# Patient Record
Sex: FEMALE | Race: WHITE | NOT HISPANIC OR LATINO | Employment: OTHER
[De-identification: names, ages, dates, MRNs, and addresses within clinical notes are randomized per-mention and may not be internally consistent; named-entity substitution may affect disease eponyms.]

---

## 2017-04-06 LAB
ANALYZER ANC (IANC): ABNORMAL
ANION GAP SERPL CALC-SCNC: 12 MMOL/L (ref 10–20)
BASOPHILS # BLD: 0 THOUSAND/MCL (ref 0–0.3)
BASOPHILS NFR BLD: 0 %
BUN SERPL-MCNC: 14 MG/DL (ref 6–20)
BUN/CREAT SERPL: 17 (ref 7–25)
CALCIUM SERPL-MCNC: 8.5 MG/DL (ref 8.4–10.2)
CHLORIDE: 91 MMOL/L (ref 98–107)
CO2 SERPL-SCNC: 28 MMOL/L (ref 21–32)
CREAT SERPL-MCNC: 0.82 MG/DL (ref 0.51–0.95)
DIFFERENTIAL METHOD BLD: ABNORMAL
EOSINOPHIL # BLD: 0.3 THOUSAND/MCL (ref 0.1–0.5)
EOSINOPHIL NFR BLD: 3 %
ERYTHROCYTE [DISTWIDTH] IN BLOOD: 12.6 % (ref 11–15)
GLUCOSE SERPL-MCNC: 113 MG/DL (ref 65–99)
HEMATOCRIT: 38.8 % (ref 36–46.5)
HGB BLD-MCNC: 13.6 GM/DL (ref 12–15.5)
LYMPHOCYTES # BLD: 1.6 THOUSAND/MCL (ref 1–4)
LYMPHOCYTES NFR BLD: 17 %
MCH RBC QN AUTO: 30.6 PG (ref 26–34)
MCHC RBC AUTO-ENTMCNC: 35.1 GM/DL (ref 32–36.5)
MCV RBC AUTO: 87.4 FL (ref 78–100)
MONOCYTES # BLD: 1 THOUSAND/MCL (ref 0.3–0.9)
MONOCYTES NFR BLD: 11 %
NEUTROPHILS # BLD: 6.5 THOUSAND/MCL (ref 1.8–7.7)
NEUTROPHILS NFR BLD: 69 %
NEUTS SEG NFR BLD: ABNORMAL %
PERCENT NRBC: ABNORMAL
PLATELET # BLD: 312 THOUSAND/MCL (ref 140–450)
POTASSIUM SERPL-SCNC: 4 MMOL/L (ref 3.4–5.1)
RBC # BLD: 4.44 MILLION/MCL (ref 4–5.2)
SODIUM SERPL-SCNC: 127 MMOL/L (ref 135–145)
TROPONIN I SERPL HS-MCNC: <0.02 NG/ML
WBC # BLD: 9.4 THOUSAND/MCL (ref 4.2–11)

## 2017-04-07 ENCOUNTER — DIAGNOSTIC TRANS (OUTPATIENT)
Dept: OTHER | Age: 82
End: 2017-04-07

## 2017-04-07 ENCOUNTER — HOSPITAL (OUTPATIENT)
Dept: OTHER | Age: 82
End: 2017-04-07
Attending: HOSPITALIST

## 2017-04-07 LAB
ANION GAP SERPL CALC-SCNC: 12 MMOL/L (ref 10–20)
BUN SERPL-MCNC: 15 MG/DL (ref 6–20)
BUN/CREAT SERPL: 21 (ref 7–25)
CALCIUM SERPL-MCNC: 8.9 MG/DL (ref 8.4–10.2)
CHLORIDE: 96 MMOL/L (ref 98–107)
CO2 SERPL-SCNC: 25 MMOL/L (ref 21–32)
CREAT SERPL-MCNC: 0.72 MG/DL (ref 0.51–0.95)
GLUCOSE SERPL-MCNC: 136 MG/DL (ref 65–99)
POTASSIUM SERPL-SCNC: 3.8 MMOL/L (ref 3.4–5.1)
SODIUM SERPL-SCNC: 129 MMOL/L (ref 135–145)
TROPONIN I SERPL HS-MCNC: 0.05 NG/ML
TROPONIN I SERPL HS-MCNC: 0.13 NG/ML
TROPONIN I SERPL HS-MCNC: <0.02 NG/ML

## 2017-04-24 PROBLEM — I10 ESSENTIAL HYPERTENSION: Status: ACTIVE | Noted: 2017-04-24

## 2017-09-11 ENCOUNTER — APPOINTMENT (OUTPATIENT)
Dept: GENERAL RADIOLOGY | Facility: HOSPITAL | Age: 82
DRG: 309 | End: 2017-09-11
Attending: HOSPITALIST
Payer: MEDICARE

## 2017-09-11 ENCOUNTER — APPOINTMENT (OUTPATIENT)
Dept: GENERAL RADIOLOGY | Age: 82
DRG: 309 | End: 2017-09-11
Attending: HOSPITALIST
Payer: MEDICARE

## 2017-09-11 ENCOUNTER — HOSPITAL ENCOUNTER (INPATIENT)
Facility: HOSPITAL | Age: 82
LOS: 2 days | Discharge: HOME | DRG: 309 | End: 2017-09-13
Attending: EMERGENCY MEDICINE | Admitting: HOSPITALIST
Payer: MEDICARE

## 2017-09-11 ENCOUNTER — APPOINTMENT (OUTPATIENT)
Dept: GENERAL RADIOLOGY | Facility: HOSPITAL | Age: 82
DRG: 309 | End: 2017-09-11
Attending: EMERGENCY MEDICINE
Payer: MEDICARE

## 2017-09-11 ENCOUNTER — APPOINTMENT (OUTPATIENT)
Dept: CT IMAGING | Facility: HOSPITAL | Age: 82
DRG: 309 | End: 2017-09-11
Attending: EMERGENCY MEDICINE
Payer: MEDICARE

## 2017-09-11 DIAGNOSIS — R55 SYNCOPE AND COLLAPSE: Primary | ICD-10-CM

## 2017-09-11 DIAGNOSIS — E87.1 HYPONATREMIA: ICD-10-CM

## 2017-09-11 LAB
ALBUMIN SERPL-MCNC: 3 G/DL (ref 3.5–4.8)
ALP LIVER SERPL-CCNC: 73 U/L (ref 55–142)
ALT SERPL-CCNC: 20 U/L (ref 14–54)
AST SERPL-CCNC: 15 U/L (ref 15–41)
ATRIAL RATE: 56 BPM
ATRIAL RATE: 57 BPM
ATRIAL RATE: 61 BPM
BASOPHILS # BLD AUTO: 0.04 X10(3) UL (ref 0–0.1)
BASOPHILS NFR BLD AUTO: 0.2 %
BILIRUB SERPL-MCNC: 0.8 MG/DL (ref 0.1–2)
BILIRUB UR QL STRIP.AUTO: NEGATIVE
BUN BLD-MCNC: 22 MG/DL (ref 8–20)
CALCIUM BLD-MCNC: 8.5 MG/DL (ref 8.3–10.3)
CHLORIDE: 90 MMOL/L (ref 101–111)
CLARITY UR REFRACT.AUTO: CLEAR
CO2: 24 MMOL/L (ref 22–32)
COLOR UR AUTO: YELLOW
CREAT BLD-MCNC: 0.96 MG/DL (ref 0.55–1.02)
EOSINOPHIL # BLD AUTO: 0.07 X10(3) UL (ref 0–0.3)
EOSINOPHIL NFR BLD AUTO: 0.4 %
ERYTHROCYTE [DISTWIDTH] IN BLOOD BY AUTOMATED COUNT: 12.5 % (ref 11.5–16)
GLUCOSE BLD-MCNC: 116 MG/DL (ref 70–99)
GLUCOSE UR STRIP.AUTO-MCNC: NEGATIVE MG/DL
HCT VFR BLD AUTO: 37.2 % (ref 34–50)
HGB BLD-MCNC: 13.2 G/DL (ref 12–16)
IMMATURE GRANULOCYTE COUNT: 0.14 X10(3) UL (ref 0–1)
IMMATURE GRANULOCYTE RATIO %: 0.9 %
LACTIC ACID: 0.1 MMOL/L (ref 0.5–2)
LEUKOCYTE ESTERASE UR QL STRIP.AUTO: NEGATIVE
LYMPHOCYTES # BLD AUTO: 1.03 X10(3) UL (ref 0.9–4)
LYMPHOCYTES NFR BLD AUTO: 6.3 %
M PROTEIN MFR SERPL ELPH: 6.6 G/DL (ref 6.1–8.3)
MCH RBC QN AUTO: 31.4 PG (ref 27–33.2)
MCHC RBC AUTO-ENTMCNC: 35.5 G/DL (ref 31–37)
MCV RBC AUTO: 88.4 FL (ref 81–100)
MONOCYTES # BLD AUTO: 1.49 X10(3) UL (ref 0.1–0.6)
MONOCYTES NFR BLD AUTO: 9.1 %
NEUTROPHIL ABS PRELIM: 13.66 X10 (3) UL (ref 1.3–6.7)
NEUTROPHILS # BLD AUTO: 13.66 X10(3) UL (ref 1.3–6.7)
NEUTROPHILS NFR BLD AUTO: 83.1 %
NITRITE UR QL STRIP.AUTO: NEGATIVE
P AXIS: 37 DEGREES
P AXIS: 38 DEGREES
P AXIS: 47 DEGREES
P-R INTERVAL: 184 MS
P-R INTERVAL: 184 MS
P-R INTERVAL: 186 MS
PH UR STRIP.AUTO: 6 [PH] (ref 4.5–8)
PLATELET # BLD AUTO: 443 10(3)UL (ref 150–450)
POTASSIUM SERPL-SCNC: 4.2 MMOL/L (ref 3.6–5.1)
PROT UR STRIP.AUTO-MCNC: NEGATIVE MG/DL
Q-T INTERVAL: 428 MS
Q-T INTERVAL: 432 MS
Q-T INTERVAL: 452 MS
QRS DURATION: 88 MS
QRS DURATION: 94 MS
QRS DURATION: 98 MS
QTC CALCULATION (BEZET): 413 MS
QTC CALCULATION (BEZET): 420 MS
QTC CALCULATION (BEZET): 455 MS
R AXIS: -28 DEGREES
R AXIS: -28 DEGREES
R AXIS: -29 DEGREES
RBC # BLD AUTO: 4.21 X10(6)UL (ref 3.8–5.1)
RBC UR QL AUTO: NEGATIVE
RED CELL DISTRIBUTION WIDTH-SD: 40 FL (ref 35.1–46.3)
SODIUM SERPL-SCNC: 124 MMOL/L (ref 136–144)
SP GR UR STRIP.AUTO: 1.01 (ref 1–1.03)
T AXIS: 16 DEGREES
T AXIS: 25 DEGREES
T AXIS: 35 DEGREES
TROPONIN: <0.046 NG/ML (ref ?–0.05)
UROBILINOGEN UR STRIP.AUTO-MCNC: <2 MG/DL
VENTRICULAR RATE: 56 BPM
VENTRICULAR RATE: 57 BPM
VENTRICULAR RATE: 61 BPM
WBC # BLD AUTO: 16.4 X10(3) UL (ref 4–13)

## 2017-09-11 PROCEDURE — 71010 XR CHEST AP PORTABLE  (CPT=71010): CPT | Performed by: EMERGENCY MEDICINE

## 2017-09-11 PROCEDURE — 70450 CT HEAD/BRAIN W/O DYE: CPT | Performed by: EMERGENCY MEDICINE

## 2017-09-11 PROCEDURE — 99223 1ST HOSP IP/OBS HIGH 75: CPT | Performed by: HOSPITALIST

## 2017-09-11 PROCEDURE — 73523 X-RAY EXAM HIPS BI 5/> VIEWS: CPT | Performed by: HOSPITALIST

## 2017-09-11 PROCEDURE — 72100 X-RAY EXAM L-S SPINE 2/3 VWS: CPT | Performed by: HOSPITALIST

## 2017-09-11 RX ORDER — AMOXICILLIN 500 MG/1
500 CAPSULE ORAL 3 TIMES DAILY
Status: DISCONTINUED | OUTPATIENT
Start: 2017-09-11 | End: 2017-09-13

## 2017-09-11 RX ORDER — HEPARIN SODIUM 5000 [USP'U]/ML
5000 INJECTION, SOLUTION INTRAVENOUS; SUBCUTANEOUS EVERY 8 HOURS SCHEDULED
Status: DISCONTINUED | OUTPATIENT
Start: 2017-09-11 | End: 2017-09-13

## 2017-09-11 RX ORDER — ONDANSETRON 2 MG/ML
4 INJECTION INTRAMUSCULAR; INTRAVENOUS EVERY 6 HOURS PRN
Status: DISCONTINUED | OUTPATIENT
Start: 2017-09-11 | End: 2017-09-13

## 2017-09-11 RX ORDER — SODIUM CHLORIDE 9 MG/ML
INJECTION, SOLUTION INTRAVENOUS CONTINUOUS
Status: CANCELLED | OUTPATIENT
Start: 2017-09-11 | End: 2017-09-11

## 2017-09-11 RX ORDER — ATORVASTATIN CALCIUM 20 MG/1
20 TABLET, FILM COATED ORAL NIGHTLY
Status: DISCONTINUED | OUTPATIENT
Start: 2017-09-12 | End: 2017-09-13

## 2017-09-11 RX ORDER — AMLODIPINE BESYLATE 5 MG/1
5 TABLET ORAL DAILY
Status: DISCONTINUED | OUTPATIENT
Start: 2017-09-12 | End: 2017-09-13

## 2017-09-11 RX ORDER — SODIUM CHLORIDE 9 MG/ML
INJECTION, SOLUTION INTRAVENOUS CONTINUOUS
Status: DISCONTINUED | OUTPATIENT
Start: 2017-09-11 | End: 2017-09-11

## 2017-09-11 RX ORDER — CLOPIDOGREL BISULFATE 75 MG/1
75 TABLET ORAL DAILY
Status: DISCONTINUED | OUTPATIENT
Start: 2017-09-12 | End: 2017-09-13

## 2017-09-11 RX ORDER — SODIUM CHLORIDE 9 MG/ML
INJECTION, SOLUTION INTRAVENOUS CONTINUOUS
Status: DISCONTINUED | OUTPATIENT
Start: 2017-09-11 | End: 2017-09-13

## 2017-09-11 RX ORDER — LOSARTAN POTASSIUM 100 MG/1
100 TABLET ORAL DAILY
Status: DISCONTINUED | OUTPATIENT
Start: 2017-09-12 | End: 2017-09-13

## 2017-09-11 RX ORDER — PRAVASTATIN SODIUM 80 MG/1
80 TABLET ORAL DAILY
COMMUNITY
End: 2018-07-09

## 2017-09-11 RX ORDER — ASPIRIN 81 MG/1
81 TABLET ORAL DAILY
COMMUNITY
End: 2020-04-06

## 2017-09-11 RX ORDER — METOPROLOL SUCCINATE 100 MG/1
100 TABLET, EXTENDED RELEASE ORAL
Status: DISCONTINUED | OUTPATIENT
Start: 2017-09-12 | End: 2017-09-13

## 2017-09-11 RX ORDER — ONDANSETRON 2 MG/ML
4 INJECTION INTRAMUSCULAR; INTRAVENOUS EVERY 4 HOURS PRN
Status: CANCELLED | OUTPATIENT
Start: 2017-09-11

## 2017-09-11 RX ORDER — ASPIRIN 81 MG/1
81 TABLET ORAL DAILY
Status: DISCONTINUED | OUTPATIENT
Start: 2017-09-12 | End: 2017-09-13

## 2017-09-11 RX ORDER — AMOXICILLIN 500 MG/1
500 CAPSULE ORAL 3 TIMES DAILY
COMMUNITY
Start: 2017-09-06 | End: 2017-09-13

## 2017-09-11 RX ORDER — ACETAMINOPHEN 325 MG/1
650 TABLET ORAL EVERY 6 HOURS PRN
Status: DISCONTINUED | OUTPATIENT
Start: 2017-09-11 | End: 2017-09-13

## 2017-09-11 RX ORDER — EZETIMIBE 10 MG/1
10 TABLET ORAL NIGHTLY
Status: DISCONTINUED | OUTPATIENT
Start: 2017-09-11 | End: 2017-09-13

## 2017-09-11 NOTE — ED NOTES
Pt on phone with daughter informing her of plan of care. Resting. Denies dizziness, nausea, lightheadedness prior to syncopal event or afterwards.   States \"I was talking with the people at the store and next thing I knew, someone was holding my head and

## 2017-09-11 NOTE — CONSULTS
[facility]  Report of Consultation    Padmini Slade Patient Status:  Emergency    1927 MRN [id number]   Location [address] [facility] Attending Bradford Gallegos MD   Hosp Day # 0 PCP Marino Cruz MD, MD     Reason for [facility].    History:  Past Medical History:   Diagnosis Date   • Atherosclerosis of coronary artery    • CAD (coronary artery disease) 3/2/2015   • Carotid artery disease ([facility] 3/2/2015   • Dyslipidemia 3/2/2015   • [facility] 3/2/2015     Pas 09/11/17  1418   * 124*   K 4.90 4.2   CL 90* 90*   CO2 28.2 24.0   BUN 22* 22*   CREATSERUM 1.03* 0.96   CA 8.9 8.5       Recent Labs   09/09/17  0831 09/11/17  1418   ALT 20 20   AST 21 15   ALB 3.2* 3.0*         Recent Labs   09/11/17  1418   TROP

## 2017-09-11 NOTE — ED PROVIDER NOTES
Patient Seen in: [facility] Emergency Department    History   Patient presents with:  Syncope (cardiovascular, neurologic)    Stated Complaint: syncope    HPI    This is a 40-year-old female who states that she was at a thrift fair.   She states that Pulse 58   Temp 98.1 °F (36.7 °C) (Temporal)   Resp 14   Ht 154.9 cm (5' 1\")   Wt 68.9 kg   SpO2 99%   BMI 28.72 kg/m²         Physical Exam  General: .   The patient has no signs of trauma to head or neck there is no midline neck tenderness she has hernan Please view results for these tests on the individual orders.    URINALYSIS WITH CULTURE REFLEX   LACTIC ACID, PLASMA   LACTIC ACID, PLASMA   LACTIC ACID, PLASMA   RAINBOW DRAW BLUE   RAINBOW DRAW LAVENDER   RAINBOW DRAW LIGHT GREEN   RAINBOW DRAW GOLD The visualized paranasal sinuses show mucosal thickening within the right maxillary sinus. . No evidence of depressed skull fracture. CONCLUSION: #1. No acute intracranial findings #2. Cerebral atrophy with small vessel changes.    Dictated by: Von I discussed this case with Dr. Hayes Lay will see the patient in consultation. The urine will be sent if the patient is able to urinate. Here.     Disposition and Plan     Clinical Impression:  Syncope and collapse  (primary encounter diagnosis)  Hypon

## 2017-09-11 NOTE — H&P
[facility]  History and Physical     Johnie Hinders Patient Status:  Emergency    1927 MRN [id number]   Location 6[facility] Attending Edgardo Dunaway MD   Hosp Day # 0 PCP Morro Davis MD, MD     Chief Complaint: AmLODIPine Besylate 5 MG Oral Tab Take 1 tablet (5 mg total) by mouth daily. Disp: 90 tablet Rfl: 3   Clopidogrel Bisulfate (PLAVIX) 75 MG Oral Tab Take 1 tablet (75 mg total) by mouth daily.  Disp: 90 tablet Rfl: 3       Review of Systems:   A comprehens hypertension   6. Renal artery stenosis sp stent  7. Dyslipidemia  8. Leukocytosis may be reactive or d/t tooth infection  9.  Right lung nodule    Plan:  Admit  IVF  Hold HCTZ  ECHO  Telemetry  Cardiology consult  Check hip / pelvic and lumbar spine x-ray

## 2017-09-11 NOTE — ED INITIAL ASSESSMENT (HPI)
Pt had witnessed syncopal episode today while shopping. Reports not eating breakfast and following a restricted soft food diet over the last few days r/t oral surgery.

## 2017-09-12 ENCOUNTER — APPOINTMENT (OUTPATIENT)
Dept: CV DIAGNOSTICS | Facility: HOSPITAL | Age: 82
DRG: 309 | End: 2017-09-12
Attending: HOSPITALIST
Payer: MEDICARE

## 2017-09-12 ENCOUNTER — APPOINTMENT (OUTPATIENT)
Dept: CV DIAGNOSTICS | Facility: HOSPITAL | Age: 82
DRG: 309 | End: 2017-09-12
Attending: INTERNAL MEDICINE
Payer: MEDICARE

## 2017-09-12 LAB
BASOPHILS # BLD AUTO: 0.04 X10(3) UL (ref 0–0.1)
BASOPHILS NFR BLD AUTO: 0.3 %
BUN BLD-MCNC: 13 MG/DL (ref 8–20)
CALCIUM BLD-MCNC: 8.4 MG/DL (ref 8.3–10.3)
CHLORIDE: 96 MMOL/L (ref 101–111)
CO2: 21 MMOL/L (ref 22–32)
CREAT BLD-MCNC: 0.62 MG/DL (ref 0.55–1.02)
EOSINOPHIL # BLD AUTO: 0.1 X10(3) UL (ref 0–0.3)
EOSINOPHIL NFR BLD AUTO: 0.7 %
ERYTHROCYTE [DISTWIDTH] IN BLOOD BY AUTOMATED COUNT: 12.4 % (ref 11.5–16)
GLUCOSE BLD-MCNC: 89 MG/DL (ref 70–99)
HAV IGM SER QL: 2.1 MG/DL (ref 1.7–3)
HCT VFR BLD AUTO: 35.9 % (ref 34–50)
HGB BLD-MCNC: 12.5 G/DL (ref 12–16)
IMMATURE GRANULOCYTE COUNT: 0.12 X10(3) UL (ref 0–1)
IMMATURE GRANULOCYTE RATIO %: 0.9 %
LYMPHOCYTES # BLD AUTO: 1.32 X10(3) UL (ref 0.9–4)
LYMPHOCYTES NFR BLD AUTO: 9.7 %
MCH RBC QN AUTO: 31.1 PG (ref 27–33.2)
MCHC RBC AUTO-ENTMCNC: 34.8 G/DL (ref 31–37)
MCV RBC AUTO: 89.3 FL (ref 81–100)
MONOCYTES # BLD AUTO: 1.41 X10(3) UL (ref 0.1–0.6)
MONOCYTES NFR BLD AUTO: 10.3 %
NEUTROPHIL ABS PRELIM: 10.66 X10 (3) UL (ref 1.3–6.7)
NEUTROPHILS # BLD AUTO: 10.66 X10(3) UL (ref 1.3–6.7)
NEUTROPHILS NFR BLD AUTO: 78.1 %
PLATELET # BLD AUTO: 387 10(3)UL (ref 150–450)
POTASSIUM SERPL-SCNC: 4.2 MMOL/L (ref 3.6–5.1)
RBC # BLD AUTO: 4.02 X10(6)UL (ref 3.8–5.1)
RED CELL DISTRIBUTION WIDTH-SD: 40.8 FL (ref 35.1–46.3)
SODIUM SERPL-SCNC: 126 MMOL/L (ref 136–144)
WBC # BLD AUTO: 13.7 X10(3) UL (ref 4–13)

## 2017-09-12 PROCEDURE — 99233 SBSQ HOSP IP/OBS HIGH 50: CPT | Performed by: HOSPITALIST

## 2017-09-12 PROCEDURE — 93306 TTE W/DOPPLER COMPLETE: CPT | Performed by: INTERNAL MEDICINE

## 2017-09-12 NOTE — PLAN OF CARE
Pt A/O X4. RA. NSR, SB on tele. Denies dizziness or lightheadedness. Denies pain. Pt is sleeping comfortably. Will continue to monitor.      CARDIOVASCULAR - ADULT    • Maintains optimal cardiac output and hemodynamic stability Progressing    • Absence of c

## 2017-09-12 NOTE — PROGRESS NOTES
09/12/17 0506 09/12/17 0508 09/12/17 0510   Vital Signs   /50 148/59 120/64   BP Location Left arm Left arm Left arm   BP Method Automatic Automatic Automatic   Patient Position Lying Sitting Standing

## 2017-09-12 NOTE — PROGRESS NOTES
[facility]  Cardiology Progress Note    Johnei Hinders Patient Status:  Inpatient    1927 MRN [id number]   [facility] 2NE-A Attending Dalila Ahumada, MD   Hosp Day # 1 PCP Morro Davis MD, MD     Assessment:  syncope-I suspect this JVD  Cardiac:   RRR, normal S1 S2, no murmurs/gallops  Lungs:  Clear to auscultation bilaterally, good chest wall expansion  Abdomen: Soft, non-tender, non-distended. No hepatosplenomegaly, masses  Extremities:  Peripheral pulses are  2+, no edema.   Neuro

## 2017-09-12 NOTE — OCCUPATIONAL THERAPY NOTE
IP OT attempt to see patient for therapeutic treatment. Patient occupied with ECHO at this time. Will attempt again as able.     MÓNICA West, OTR/L  Master of Occupational Therapy  Occupational Therapist, Registered/Licensed

## 2017-09-12 NOTE — PROGRESS NOTES
[facility]  Progress Note     Kyara Morris Patient Status:  Inpatient    1927 MRN [id number]   [facility] 2NE-A Attending Carlos Smith MD   Hosp Day # 1 PCP Dimitris Ovalles MD, MD     Chief Complaint: Syncope    S: Patient repo amoxicillin  500 mg Oral TID   • Losartan Potassium  100 mg Oral Daily   • Metoprolol Succinate ER  100 mg Oral Daily Beta Blocker   • atorvastatin  20 mg Oral Nightly   • Heparin Sodium (Porcine)  5,000 Units Subcutaneous Q8H Brittany 62       ASSESSMENT / PLAN: cardiology follow-up re: ECHO  Julia DIAZ

## 2017-09-12 NOTE — PHYSICAL THERAPY NOTE
PHYSICAL THERAPY QUICK EVALUATION - INPATIENT    Room Number: 8433/8599-W  Evaluation Date: 9/12/2017  Presenting Problem: syncope, hyponatremia  Physician Order: PT Eval and Treat    Problem List  Principal Problem:    Syncope and collapse  Active Probl from another person does the patient currently need. ..   -   Moving to and from a bed to a chair (including a wheelchair)?: None   -   Need to walk in hospital room?: None   -   Climbing 3-5 steps with a railing?: None       AM-PAC Score:  Raw Score: 24

## 2017-09-12 NOTE — PLAN OF CARE
Maintains optimal cardiac output and hemodynamic stability Progressing    BP stable, orthostatics checked q shift, skin warm & dry. Absence of cardiac arrhythmias or at baseline Progressing    NSR on tele.   Electrolytes maintained within normal limits Pro

## 2017-09-13 VITALS
BODY MASS INDEX: 28.7 KG/M2 | OXYGEN SATURATION: 97 % | WEIGHT: 152 LBS | DIASTOLIC BLOOD PRESSURE: 54 MMHG | RESPIRATION RATE: 20 BRPM | SYSTOLIC BLOOD PRESSURE: 150 MMHG | HEIGHT: 61 IN | TEMPERATURE: 98 F | HEART RATE: 67 BPM

## 2017-09-13 LAB
BUN BLD-MCNC: 12 MG/DL (ref 8–20)
CALCIUM BLD-MCNC: 8 MG/DL (ref 8.3–10.3)
CHLORIDE: 101 MMOL/L (ref 101–111)
CO2: 22 MMOL/L (ref 22–32)
CREAT BLD-MCNC: 0.59 MG/DL (ref 0.55–1.02)
GLUCOSE BLD-MCNC: 100 MG/DL (ref 70–99)
POTASSIUM SERPL-SCNC: 4 MMOL/L (ref 3.6–5.1)
SODIUM SERPL-SCNC: 130 MMOL/L (ref 136–144)

## 2017-09-13 PROCEDURE — 99239 HOSP IP/OBS DSCHRG MGMT >30: CPT | Performed by: HOSPITALIST

## 2017-09-13 NOTE — OCCUPATIONAL THERAPY NOTE
OCCUPATIONAL THERAPY QUICK EVALUATION - INPATIENT    Room Number: 5889/9850-J  Evaluation Date: 9/13/2017     Type of Evaluation: Quick Eval  Presenting Problem: syncoep and collapse    Physician Order: IP Consult to Occupational Therapy  Reason for Precious Kinga ASSESSMENT  Upper extremity ROM is within functional limits     Upper extremity strength is within functional limits     NEUROLOGICAL FINDINGS  Neurological Findings: Coordination - Finger to Nose;Coordination - Rapid Alternating Movement; Coordination - Fi Patient is a 80year old female admitted on 9/11/2017 for Syncope and collapse [R55]  Hyponatremia [E87.1. Complete medical history and occupational profile noted above. Pt at baseline with regard to function. Pt DC from IP OT services at this time.

## 2017-09-13 NOTE — PROGRESS NOTES
[facility]  Progress Note     Edwin Boudreaux Patient Status:  Inpatient    1927 MRN [id number]   [facility] 2NE-A Attending Sera Diaz MD   Hosp Day # 2 PCP Eboni Kimball MD, MD     Chief Complaint: Syncope    S: Patient repo • AmLODIPine Besylate  5 mg Oral Daily   • ezetimibe  10 mg Oral Nightly   • Clopidogrel Bisulfate  75 mg Oral Daily   • aspirin  81 mg Oral Daily   • amoxicillin  500 mg Oral TID   • Losartan Potassium  100 mg Oral Daily   • Metoprolol Succinate ER  100

## 2017-09-13 NOTE — DISCHARGE SUMMARY
[facility] [place] [facility]  DISCHARGE SUMMARY     [facility] Patient Status:  Inpatient    1927 MRN [id number]   [facility] 2NE-A Attending Beckie Dunaway MD   Hosp Day # 2 PCP Ronald Fuller MD, MD     Date of Admission: 2017  Date of Reema Lua weakness. No headache or change in vision. Brief Synopsis: Patient presented after syncopal episode. Does not recall events. CT brain neg, labs showed hyponatremia. Patient is on HCTZ at home. She denied any dizziness/weakness.  Decreases po intake s Laurel Master, [facility] [address] [address] [place]  [phone]    Schedule an appointment as soon as possible for a visit in 1 week    Future Appointments  Date Time Provider Zoraida Mcdonough   9/13/2017 2:40 PM Anmol Eid

## 2017-09-13 NOTE — PROGRESS NOTES
[facility]  Cardiology Progress Note    Johnie Hinders Patient Status:  Inpatient    1927 MRN [id number]   [facility] 2NE-A Attending Dalila Ahumada, MD   Hosp Day # 2 PCP Morro Davis MD, MD     Assessment:  syncope-I suspect this 1057 : 156 lb (70.8 kg)      Physical Exam:  General:  no apparent distress. HEENT:  mucous membranes moist, sclera anicteric.  Tooth bleeding  Neck: Supple with no JVD  Cardiac:   RRR, normal S1 S2,  no murmurs/gallops  Lungs:  Clear to auscultation bilat

## 2017-09-21 ENCOUNTER — HOSPITAL ENCOUNTER (OUTPATIENT)
Dept: CT IMAGING | Age: 82
Discharge: HOME | End: 2017-09-21
Attending: INTERNAL MEDICINE
Payer: MEDICARE

## 2017-09-21 DIAGNOSIS — R91.1 PULMONARY NODULE: ICD-10-CM

## 2017-09-21 PROCEDURE — 71260 CT THORAX DX C+: CPT | Performed by: INTERNAL MEDICINE

## 2019-03-20 ENCOUNTER — HOSPITAL ENCOUNTER (OUTPATIENT)
Dept: GENERAL RADIOLOGY | Age: 84
Discharge: HOME | End: 2019-03-20
Attending: INTERNAL MEDICINE
Payer: MEDICARE

## 2019-03-20 DIAGNOSIS — M48.00 SPINAL STENOSIS: ICD-10-CM

## 2019-03-20 PROCEDURE — 72100 X-RAY EXAM L-S SPINE 2/3 VWS: CPT | Performed by: INTERNAL MEDICINE

## 2020-05-23 ENCOUNTER — HOSPITAL ENCOUNTER (OUTPATIENT)
Age: 85
Setting detail: OBSERVATION
Discharge: HOME | End: 2020-05-24
Attending: EMERGENCY MEDICINE | Admitting: HOSPITALIST

## 2020-05-23 DIAGNOSIS — R04.0 ACUTE ANTERIOR EPISTAXIS: Primary | ICD-10-CM

## 2020-05-23 LAB
ANION GAP SERPL CALC-SCNC: 12 MMOL/L (ref 10–20)
BASOPHILS # BLD: 0.1 K/MCL (ref 0–0.3)
BASOPHILS NFR BLD: 1 %
BUN SERPL-MCNC: 19 MG/DL (ref 6–20)
BUN/CREAT SERPL: 23 (ref 7–25)
CALCIUM SERPL-MCNC: 8.5 MG/DL (ref 8.4–10.2)
CHLORIDE SERPL-SCNC: 98 MMOL/L (ref 98–107)
CO2 SERPL-SCNC: 24 MMOL/L (ref 21–32)
CREAT SERPL-MCNC: 0.83 MG/DL (ref 0.51–0.95)
DIFFERENTIAL METHOD BLD: ABNORMAL
EOSINOPHIL # BLD: 0.4 K/MCL (ref 0.1–0.5)
EOSINOPHIL NFR BLD: 4 %
ERYTHROCYTE [DISTWIDTH] IN BLOOD: 13.2 % (ref 11–15)
GLUCOSE SERPL-MCNC: 111 MG/DL (ref 65–99)
HCT VFR BLD CALC: 41 % (ref 36–46.5)
HGB BLD-MCNC: 13.9 G/DL (ref 12–15.5)
IMM GRANULOCYTES # BLD AUTO: 0.1 K/MCL (ref 0–0.2)
IMM GRANULOCYTES NFR BLD: 1 %
INR PPP: 1
LYMPHOCYTES # BLD: 1.9 K/MCL (ref 1–4)
LYMPHOCYTES NFR BLD: 16 %
MCH RBC QN AUTO: 30.2 PG (ref 26–34)
MCHC RBC AUTO-ENTMCNC: 33.9 G/DL (ref 32–36.5)
MCV RBC AUTO: 88.9 FL (ref 78–100)
MONOCYTES # BLD: 1.3 K/MCL (ref 0.3–0.9)
MONOCYTES NFR BLD: 11 %
NEUTROPHILS # BLD: 8.1 K/MCL (ref 1.8–7.7)
NEUTROPHILS NFR BLD: 67 %
NRBC BLD MANUAL-RTO: 0 /100 WBC
PLATELET # BLD: 465 K/MCL (ref 140–450)
POTASSIUM SERPL-SCNC: 4.2 MMOL/L (ref 3.4–5.1)
PROTHROMBIN TIME: 10.9 SEC (ref 9.7–11.8)
RBC # BLD: 4.61 MIL/MCL (ref 4–5.2)
SARS-COV-2 RNA RESP QL NAA+PROBE: NOT DETECTED
SERVICE CMNT-IMP: NORMAL
SODIUM SERPL-SCNC: 130 MMOL/L (ref 135–145)
SPECIMEN SOURCE: NORMAL
WBC # BLD: 11.7 K/MCL (ref 4.2–11)

## 2020-05-23 PROCEDURE — 36415 COLL VENOUS BLD VENIPUNCTURE: CPT

## 2020-05-23 PROCEDURE — G0378 HOSPITAL OBSERVATION PER HR: HCPCS

## 2020-05-23 PROCEDURE — 80048 BASIC METABOLIC PNL TOTAL CA: CPT

## 2020-05-23 PROCEDURE — C9803 HOPD COVID-19 SPEC COLLECT: HCPCS

## 2020-05-23 PROCEDURE — 85025 COMPLETE CBC W/AUTO DIFF WBC: CPT

## 2020-05-23 PROCEDURE — 87635 SARS-COV-2 COVID-19 AMP PRB: CPT

## 2020-05-23 PROCEDURE — 99284 EMERGENCY DEPT VISIT MOD MDM: CPT

## 2020-05-23 PROCEDURE — 10002803 HB RX 637: Performed by: EMERGENCY MEDICINE

## 2020-05-23 PROCEDURE — 30901 CONTROL OF NOSEBLEED: CPT

## 2020-05-23 PROCEDURE — 10004651 HB RX, NO CHARGE ITEM: Performed by: HOSPITALIST

## 2020-05-23 PROCEDURE — 10002803 HB RX 637: Performed by: HOSPITALIST

## 2020-05-23 PROCEDURE — 85610 PROTHROMBIN TIME: CPT

## 2020-05-23 RX ORDER — AMLODIPINE BESYLATE 5 MG/1
5 TABLET ORAL
COMMUNITY
Start: 2020-01-30 | End: 2023-03-10 | Stop reason: ALTCHOICE

## 2020-05-23 RX ORDER — METOPROLOL SUCCINATE 100 MG/1
100 TABLET, EXTENDED RELEASE ORAL DAILY
COMMUNITY
Start: 2020-05-11

## 2020-05-23 RX ORDER — IRBESARTAN 300 MG/1
300 TABLET ORAL DAILY
COMMUNITY
Start: 2020-03-21 | End: 2023-03-10 | Stop reason: SDUPTHER

## 2020-05-23 RX ORDER — CLOPIDOGREL BISULFATE 75 MG/1
75 TABLET ORAL
Status: ON HOLD | COMMUNITY
Start: 2016-11-21 | End: 2020-05-24 | Stop reason: HOSPADM

## 2020-05-23 RX ORDER — ACETAMINOPHEN 325 MG/1
650 TABLET ORAL EVERY 4 HOURS PRN
Status: DISCONTINUED | OUTPATIENT
Start: 2020-05-23 | End: 2020-05-24 | Stop reason: HOSPADM

## 2020-05-23 RX ORDER — PRAVASTATIN SODIUM 80 MG/1
80 TABLET ORAL NIGHTLY
COMMUNITY
Start: 2020-02-05

## 2020-05-23 RX ORDER — TRANEXAMIC ACID 100 MG/ML
1 INJECTION, SOLUTION INTRAVENOUS ONCE
Status: DISCONTINUED | OUTPATIENT
Start: 2020-05-23 | End: 2020-05-23 | Stop reason: CLARIF

## 2020-05-23 RX ORDER — HYDRALAZINE HYDROCHLORIDE 20 MG/ML
10 INJECTION INTRAMUSCULAR; INTRAVENOUS EVERY 6 HOURS PRN
Status: DISCONTINUED | OUTPATIENT
Start: 2020-05-23 | End: 2020-05-24 | Stop reason: HOSPADM

## 2020-05-23 RX ORDER — CEPHALEXIN 250 MG/1
500 CAPSULE ORAL ONCE
Status: COMPLETED | OUTPATIENT
Start: 2020-05-23 | End: 2020-05-23

## 2020-05-23 RX ORDER — ONDANSETRON 2 MG/ML
4 INJECTION INTRAMUSCULAR; INTRAVENOUS EVERY 6 HOURS PRN
Status: DISCONTINUED | OUTPATIENT
Start: 2020-05-23 | End: 2020-05-24 | Stop reason: HOSPADM

## 2020-05-23 RX ORDER — SODIUM CHLORIDE 9 MG/ML
INJECTION, SOLUTION INTRAVENOUS CONTINUOUS
Status: CANCELLED | OUTPATIENT
Start: 2020-05-23

## 2020-05-23 RX ORDER — AMOXICILLIN AND CLAVULANATE POTASSIUM 500; 125 MG/1; MG/1
1 TABLET, FILM COATED ORAL EVERY 8 HOURS SCHEDULED
Status: DISCONTINUED | OUTPATIENT
Start: 2020-05-23 | End: 2020-05-24 | Stop reason: HOSPADM

## 2020-05-23 RX ORDER — 0.9 % SODIUM CHLORIDE 0.9 %
2 VIAL (ML) INJECTION EVERY 12 HOURS SCHEDULED
Status: DISCONTINUED | OUTPATIENT
Start: 2020-05-23 | End: 2020-05-24 | Stop reason: HOSPADM

## 2020-05-23 RX ORDER — ASPIRIN 81 MG/1
81 TABLET ORAL DAILY
Status: ON HOLD | COMMUNITY
End: 2020-05-24 | Stop reason: HOSPADM

## 2020-05-23 RX ORDER — OXYMETAZOLINE HYDROCHLORIDE 0.05 G/100ML
2 SPRAY NASAL ONCE
Status: COMPLETED | OUTPATIENT
Start: 2020-05-23 | End: 2020-05-23

## 2020-05-23 RX ADMIN — CEPHALEXIN 500 MG: 250 CAPSULE ORAL at 13:41

## 2020-05-23 RX ADMIN — SODIUM CHLORIDE, PRESERVATIVE FREE 2 ML: 5 INJECTION INTRAVENOUS at 22:11

## 2020-05-23 RX ADMIN — Medication 2 SPRAY: at 13:00

## 2020-05-23 RX ADMIN — AMOXICILLIN AND CLAVULANATE POTASSIUM 1 TABLET: 500; 125 TABLET, FILM COATED ORAL at 22:11

## 2020-05-23 SDOH — HEALTH STABILITY: MENTAL HEALTH: HOW OFTEN DO YOU HAVE A DRINK CONTAINING ALCOHOL?: NEVER

## 2020-05-23 ASSESSMENT — ACTIVITIES OF DAILY LIVING (ADL)
ADL_SCORE: 12
ADL_BEFORE_ADMISSION: INDEPENDENT
RECENT_DECLINE_ADL: NO
CHRONIC_PAIN_PRESENT: NO
ADL_SHORT_OF_BREATH: NO

## 2020-05-23 ASSESSMENT — COGNITIVE AND FUNCTIONAL STATUS - GENERAL
DO YOU HAVE SERIOUS DIFFICULTY WALKING OR CLIMBING STAIRS: NO
ARE YOU DEAF OR DO YOU HAVE SERIOUS DIFFICULTY  HEARING: NO
DO YOU HAVE DIFFICULTY DRESSING OR BATHING: NO
BECAUSE OF A PHYSICAL, MENTAL, OR EMOTIONAL CONDITION, DO YOU HAVE DIFFICULTY DOING ERRANDS ALONE: NO
ARE YOU BLIND OR DO YOU HAVE SERIOUS DIFFICULTY SEEING, EVEN WHEN WEARING GLASSES: NO

## 2020-05-23 ASSESSMENT — ENCOUNTER SYMPTOMS
SHORTNESS OF BREATH: 0
DIZZINESS: 0
COUGH: 0
FEVER: 0
ABDOMINAL PAIN: 0
WEAKNESS: 0
VOMITING: 0
SORE THROAT: 0
CHILLS: 0
NAUSEA: 0

## 2020-05-23 ASSESSMENT — PAIN SCALES - GENERAL
PAINLEVEL_OUTOF10: 0

## 2020-05-23 ASSESSMENT — LIFESTYLE VARIABLES
HOW OFTEN DO YOU HAVE A DRINK CONTAINING ALCOHOL: NEVER
ALCOHOL_USE_STATUS: NO OR LOW RISK WITH VALIDATED TOOL
HOW OFTEN DO YOU HAVE 6 OR MORE DRINKS ON ONE OCCASION: NEVER
AUDIT-C TOTAL SCORE: 0
HOW MANY STANDARD DRINKS CONTAINING ALCOHOL DO YOU HAVE ON A TYPICAL DAY: 0,1 OR 2

## 2020-05-24 VITALS
RESPIRATION RATE: 16 BRPM | OXYGEN SATURATION: 94 % | TEMPERATURE: 97.9 F | HEART RATE: 74 BPM | WEIGHT: 149.47 LBS | HEIGHT: 60 IN | SYSTOLIC BLOOD PRESSURE: 171 MMHG | BODY MASS INDEX: 29.35 KG/M2 | DIASTOLIC BLOOD PRESSURE: 68 MMHG

## 2020-05-24 LAB
ANION GAP SERPL CALC-SCNC: 13 MMOL/L (ref 10–20)
BASOPHILS # BLD: 0.1 K/MCL (ref 0–0.3)
BASOPHILS NFR BLD: 1 %
BUN SERPL-MCNC: 20 MG/DL (ref 6–20)
BUN/CREAT SERPL: 24 (ref 7–25)
CALCIUM SERPL-MCNC: 8.4 MG/DL (ref 8.4–10.2)
CHLORIDE SERPL-SCNC: 98 MMOL/L (ref 98–107)
CO2 SERPL-SCNC: 26 MMOL/L (ref 21–32)
CREAT SERPL-MCNC: 0.84 MG/DL (ref 0.51–0.95)
DIFFERENTIAL METHOD BLD: ABNORMAL
EOSINOPHIL # BLD: 0.2 K/MCL (ref 0.1–0.5)
EOSINOPHIL NFR BLD: 2 %
ERYTHROCYTE [DISTWIDTH] IN BLOOD: 13.2 % (ref 11–15)
GLUCOSE SERPL-MCNC: 132 MG/DL (ref 65–99)
HCT VFR BLD CALC: 36.1 % (ref 36–46.5)
HGB BLD-MCNC: 12 G/DL (ref 12–15.5)
IMM GRANULOCYTES # BLD AUTO: 0.1 K/MCL (ref 0–0.2)
IMM GRANULOCYTES NFR BLD: 0 %
LYMPHOCYTES # BLD: 1.8 K/MCL (ref 1–4)
LYMPHOCYTES NFR BLD: 12 %
MCH RBC QN AUTO: 30.2 PG (ref 26–34)
MCHC RBC AUTO-ENTMCNC: 33.2 G/DL (ref 32–36.5)
MCV RBC AUTO: 90.7 FL (ref 78–100)
MONOCYTES # BLD: 1.4 K/MCL (ref 0.3–0.9)
MONOCYTES NFR BLD: 9 %
NEUTROPHILS # BLD: 12.2 K/MCL (ref 1.8–7.7)
NEUTROPHILS NFR BLD: 76 %
NRBC BLD MANUAL-RTO: 0 /100 WBC
PLATELET # BLD: 414 K/MCL (ref 140–450)
POTASSIUM SERPL-SCNC: 4.9 MMOL/L (ref 3.4–5.1)
RBC # BLD: 3.98 MIL/MCL (ref 4–5.2)
SODIUM SERPL-SCNC: 132 MMOL/L (ref 135–145)
WBC # BLD: 15.8 K/MCL (ref 4.2–11)

## 2020-05-24 PROCEDURE — G0378 HOSPITAL OBSERVATION PER HR: HCPCS

## 2020-05-24 PROCEDURE — 36415 COLL VENOUS BLD VENIPUNCTURE: CPT

## 2020-05-24 PROCEDURE — 80048 BASIC METABOLIC PNL TOTAL CA: CPT

## 2020-05-24 PROCEDURE — 10002803 HB RX 637: Performed by: HOSPITALIST

## 2020-05-24 PROCEDURE — 85025 COMPLETE CBC W/AUTO DIFF WBC: CPT

## 2020-05-24 RX ORDER — METOPROLOL SUCCINATE 100 MG/1
100 TABLET, EXTENDED RELEASE ORAL DAILY
Status: DISCONTINUED | OUTPATIENT
Start: 2020-05-24 | End: 2020-05-24 | Stop reason: HOSPADM

## 2020-05-24 RX ORDER — LOSARTAN POTASSIUM 50 MG/1
100 TABLET ORAL DAILY
Status: DISCONTINUED | OUTPATIENT
Start: 2020-05-24 | End: 2020-05-24 | Stop reason: HOSPADM

## 2020-05-24 RX ORDER — AMOXICILLIN AND CLAVULANATE POTASSIUM 500; 125 MG/1; MG/1
1 TABLET, FILM COATED ORAL EVERY 8 HOURS SCHEDULED
Qty: 15 TABLET | Refills: 0 | Status: SHIPPED | OUTPATIENT
Start: 2020-05-24 | End: 2020-05-29

## 2020-05-24 RX ORDER — PRAVASTATIN SODIUM 40 MG
80 TABLET ORAL NIGHTLY
Status: DISCONTINUED | OUTPATIENT
Start: 2020-05-24 | End: 2020-05-24 | Stop reason: HOSPADM

## 2020-05-24 RX ORDER — AMLODIPINE BESYLATE 5 MG/1
5 TABLET ORAL DAILY
Status: DISCONTINUED | OUTPATIENT
Start: 2020-05-24 | End: 2020-05-24 | Stop reason: HOSPADM

## 2020-05-24 RX ADMIN — AMOXICILLIN AND CLAVULANATE POTASSIUM 1 TABLET: 500; 125 TABLET, FILM COATED ORAL at 06:16

## 2020-05-24 ASSESSMENT — ENCOUNTER SYMPTOMS
EYE REDNESS: 0
RESPIRATORY NEGATIVE: 1
ENDOCRINE NEGATIVE: 1
GASTROINTESTINAL NEGATIVE: 1
FACIAL SWELLING: 0
TROUBLE SWALLOWING: 0
EYE ITCHING: 0
RHINORRHEA: 0
CONSTITUTIONAL NEGATIVE: 1
SORE THROAT: 0
VOICE CHANGE: 0
PHOTOPHOBIA: 0
ALLERGIC/IMMUNOLOGIC NEGATIVE: 1
NEUROLOGICAL NEGATIVE: 1
PSYCHIATRIC NEGATIVE: 1
EYE PAIN: 0
EYE DISCHARGE: 0
SINUS PRESSURE: 0
SINUS PAIN: 0
HEMATOLOGIC/LYMPHATIC NEGATIVE: 1

## 2020-05-24 ASSESSMENT — PAIN SCALES - GENERAL: PAINLEVEL_OUTOF10: 0

## 2021-03-05 DIAGNOSIS — Z23 NEED FOR VACCINATION: ICD-10-CM

## 2022-03-17 ENCOUNTER — HOSPITAL ENCOUNTER (OUTPATIENT)
Dept: GENERAL RADIOLOGY | Facility: HOSPITAL | Age: 87
Discharge: HOME | End: 2022-03-17
Attending: ORTHOPAEDIC SURGERY
Payer: MEDICARE

## 2022-03-17 DIAGNOSIS — M54.9 BACK PAIN: ICD-10-CM

## 2022-03-17 PROCEDURE — 72110 X-RAY EXAM L-2 SPINE 4/>VWS: CPT | Performed by: ORTHOPAEDIC SURGERY

## 2022-07-08 ENCOUNTER — OFFICE VISIT (OUTPATIENT)
Dept: NEUROLOGY | Facility: CLINIC | Age: 87
End: 2022-07-08
Payer: MEDICARE

## 2022-07-08 VITALS — DIASTOLIC BLOOD PRESSURE: 54 MMHG | HEART RATE: 69 BPM | SYSTOLIC BLOOD PRESSURE: 122 MMHG

## 2022-07-08 DIAGNOSIS — R56.9 SEIZURE (HCC): Primary | ICD-10-CM

## 2022-07-08 PROCEDURE — 99204 OFFICE O/P NEW MOD 45 MIN: CPT | Performed by: OTHER

## 2022-07-08 RX ORDER — LEVETIRACETAM 1000 MG/1
500 TABLET ORAL 2 TIMES DAILY
Qty: 180 TABLET | Refills: 3 | Status: SHIPPED | OUTPATIENT
Start: 2022-07-08

## 2022-07-08 RX ORDER — CIPROFLOXACIN 500 MG/1
TABLET, FILM COATED ORAL
COMMUNITY
Start: 2022-07-06

## 2022-07-14 ENCOUNTER — HOSPITAL ENCOUNTER (OUTPATIENT)
Dept: ELECTROPHYSIOLOGY | Facility: HOSPITAL | Age: 87
Discharge: HOME | End: 2022-07-14
Attending: Other
Payer: MEDICARE

## 2022-07-14 PROCEDURE — 95816 EEG AWAKE AND DROWSY: CPT | Performed by: OTHER

## 2022-07-22 ENCOUNTER — PATIENT MESSAGE (OUTPATIENT)
Dept: NEUROLOGY | Facility: CLINIC | Age: 87
End: 2022-07-22

## 2022-07-25 NOTE — TELEPHONE ENCOUNTER
I tried to call the patient directly, there was no answer. Please try to reach out to her again emphasized importance of taking seizure medications. If she is not liking side effects then we can consider a different seizure medication.     I would suggest to make a follow-up appointment so can discuss everything in person in more detail

## 2022-07-25 NOTE — TELEPHONE ENCOUNTER
From: Chalo Charlton  To: Pia Ramirez MD  Sent: 7/22/2022 12:17 PM CDT  Subject: Regarding Med    This is [facility] Daughter, Jamil Herbert. ... Just thought you'd like to know, She called me this am to tell me that she is no longer taking the Keppra cause it makes her sleep. She said that the first two days of starting the medicine all she did was sleep, so she stopped taking it. I told her that you said she would be sleepy until she became use to it but she is refusing to take it. Now I know she can be real stubborn regarding medication, so if you want her to take it, maybe you will need to call her and insist that it be taken. She will not listen to me, so I dont know what else to do. The CT scan is schedule for Aug 2.  Thank You

## 2022-07-25 NOTE — TELEPHONE ENCOUNTER
I spoke with the patient and informed her that it is necessary for her to take her medication so that she does not have a seizure. Informed her that it is very important for her health that she continue the current prescribed medication. The pt verbalized understanding and stated that she will start the [address] again today as she would rather have a side effect of sleeping rather than to have a seizure. I informed the patient that AK stated if she prefers, an alternate medication can be discussed at an OV. The pt stated she has a pending appointment and will stay on the [address] until that time and then discuss an alternate if her side effects do not subside. The pt has a pending appointment on 8/9/22. Message to [place] as azar. Reviewed and electronically signed by:  [address] [facility], [address] [place] [address], [facility]

## 2022-08-02 ENCOUNTER — HOSPITAL ENCOUNTER (OUTPATIENT)
Dept: MRI IMAGING | Facility: HOSPITAL | Age: 87
Discharge: HOME | End: 2022-08-02
Attending: Other
Payer: MEDICARE

## 2022-08-02 DIAGNOSIS — R56.9 SEIZURE (HCC): ICD-10-CM

## 2022-08-02 PROCEDURE — 82565 ASSAY OF CREATININE: CPT

## 2022-08-02 PROCEDURE — A9575 INJ GADOTERATE MEGLUMI 0.1ML: HCPCS | Performed by: OTHER

## 2022-08-02 PROCEDURE — 70553 MRI BRAIN STEM W/O & W/DYE: CPT | Performed by: OTHER

## 2022-08-03 LAB
CREAT BLD-MCNC: 0.8 MG/DL
GFR SERPLBLD BASED ON 1.73 SQ M-ARVRAT: 68 ML/MIN/1.73M2 (ref 60–?)

## 2022-08-08 ENCOUNTER — TELEPHONE (OUTPATIENT)
Dept: NEUROLOGY | Facility: CLINIC | Age: 87
End: 2022-08-08

## 2022-08-08 NOTE — TELEPHONE ENCOUNTER
Attempted to call patient. Left message to call back or check MyChart as message will be sent there as well.     Per Epic review, pt has follow-up appointment scheduled for 8/9/22 with Dr Warren Head

## 2022-08-08 NOTE — TELEPHONE ENCOUNTER
----- Message from Kamilla Black MD sent at 8/8/2022 12:40 PM CDT -----  Please let patient know that MRI brain didn't show any tumors or bleeding, but there was significant changes associated with history of high blood pressure. We will discuss it further during the follow-up appointment.

## 2022-08-09 ENCOUNTER — OFFICE VISIT (OUTPATIENT)
Dept: NEUROLOGY | Facility: CLINIC | Age: 87
End: 2022-08-09
Payer: MEDICARE

## 2022-08-09 VITALS
DIASTOLIC BLOOD PRESSURE: 60 MMHG | BODY MASS INDEX: 26.06 KG/M2 | SYSTOLIC BLOOD PRESSURE: 122 MMHG | HEIGHT: 61 IN | WEIGHT: 138 LBS

## 2022-08-09 DIAGNOSIS — R56.9 SEIZURE (HCC): Primary | ICD-10-CM

## 2022-08-09 PROCEDURE — 99214 OFFICE O/P EST MOD 30 MIN: CPT | Performed by: OTHER

## 2022-08-09 RX ORDER — LACOSAMIDE 50 MG/1
50 TABLET ORAL 2 TIMES DAILY
Qty: 60 TABLET | Refills: 5 | Status: SHIPPED | OUTPATIENT
Start: 2022-08-09

## 2023-03-10 ENCOUNTER — HOSPITAL ENCOUNTER (EMERGENCY)
Age: 88
Discharge: HOME | End: 2023-03-10
Attending: EMERGENCY MEDICINE

## 2023-03-10 VITALS
RESPIRATION RATE: 17 BRPM | BODY MASS INDEX: 29.28 KG/M2 | TEMPERATURE: 98.7 F | SYSTOLIC BLOOD PRESSURE: 173 MMHG | DIASTOLIC BLOOD PRESSURE: 106 MMHG | HEART RATE: 71 BPM | WEIGHT: 149.91 LBS | OXYGEN SATURATION: 99 %

## 2023-03-10 DIAGNOSIS — R04.0 EPISTAXIS: Primary | ICD-10-CM

## 2023-03-10 PROCEDURE — 99283 EMERGENCY DEPT VISIT LOW MDM: CPT

## 2023-03-10 PROCEDURE — 10002801 HB RX 250 W/O HCPCS: Performed by: EMERGENCY MEDICINE

## 2023-03-10 PROCEDURE — 30901 CONTROL OF NOSEBLEED: CPT

## 2023-03-10 PROCEDURE — 10002803 HB RX 637: Performed by: EMERGENCY MEDICINE

## 2023-03-10 RX ORDER — TRANEXAMIC ACID 100 MG/ML
1 INJECTION, SOLUTION INTRAVENOUS ONCE
Status: COMPLETED | OUTPATIENT
Start: 2023-03-10 | End: 2023-03-10

## 2023-03-10 RX ORDER — IRBESARTAN 300 MG/1
1 TABLET ORAL DAILY
COMMUNITY

## 2023-03-10 RX ORDER — AMOXICILLIN AND CLAVULANATE POTASSIUM 875; 125 MG/1; MG/1
1 TABLET, FILM COATED ORAL 2 TIMES DAILY
Qty: 14 TABLET | Refills: 0 | Status: SHIPPED | OUTPATIENT
Start: 2023-03-10 | End: 2023-03-17

## 2023-03-10 RX ORDER — CLOPIDOGREL BISULFATE 75 MG/1
TABLET ORAL
COMMUNITY
Start: 2023-01-03

## 2023-03-10 RX ORDER — EZETIMIBE 10 MG/1
TABLET ORAL
COMMUNITY
Start: 2023-01-03

## 2023-03-10 RX ORDER — OXYMETAZOLINE HYDROCHLORIDE 0.05 G/100ML
2 SPRAY NASAL ONCE
Status: COMPLETED | OUTPATIENT
Start: 2023-03-10 | End: 2023-03-10

## 2023-03-10 RX ADMIN — OXYMETAZOLINE HYDROCHLORIDE 2 SPRAY: 0.05 SPRAY NASAL at 10:19

## 2023-03-10 RX ADMIN — TRANEXAMIC ACID 500 MG: 1 INJECTION, SOLUTION INTRAVENOUS at 10:20

## 2023-03-10 ASSESSMENT — PAIN SCALES - GENERAL: PAINLEVEL_OUTOF10: 0

## 2023-08-19 ENCOUNTER — APPOINTMENT (OUTPATIENT)
Dept: GENERAL RADIOLOGY | Facility: HOSPITAL | Age: 88
End: 2023-08-19
Attending: EMERGENCY MEDICINE
Payer: MEDICARE

## 2023-08-19 ENCOUNTER — HOSPITAL ENCOUNTER (EMERGENCY)
Facility: HOSPITAL | Age: 88
Discharge: HOME | End: 2023-08-19
Attending: EMERGENCY MEDICINE
Payer: MEDICARE

## 2023-08-19 VITALS
DIASTOLIC BLOOD PRESSURE: 102 MMHG | BODY MASS INDEX: 26 KG/M2 | SYSTOLIC BLOOD PRESSURE: 137 MMHG | TEMPERATURE: 99 F | OXYGEN SATURATION: 97 % | WEIGHT: 138 LBS | RESPIRATION RATE: 22 BRPM | HEART RATE: 70 BPM

## 2023-08-19 DIAGNOSIS — S22.31XA CLOSED FRACTURE OF ONE RIB OF RIGHT SIDE, INITIAL ENCOUNTER: Primary | ICD-10-CM

## 2023-08-19 PROCEDURE — 99284 EMERGENCY DEPT VISIT MOD MDM: CPT

## 2023-08-19 PROCEDURE — 71101 X-RAY EXAM UNILAT RIBS/CHEST: CPT | Performed by: EMERGENCY MEDICINE

## 2023-08-19 PROCEDURE — 73502 X-RAY EXAM HIP UNI 2-3 VIEWS: CPT | Performed by: EMERGENCY MEDICINE

## 2023-08-19 RX ORDER — HYDROCODONE BITARTRATE AND ACETAMINOPHEN 5; 325 MG/1; MG/1
1 TABLET ORAL ONCE
Status: COMPLETED | OUTPATIENT
Start: 2023-08-19 | End: 2023-08-19

## 2023-08-19 RX ORDER — OXYCODONE HYDROCHLORIDE AND ACETAMINOPHEN 5; 325 MG/1; MG/1
1 TABLET ORAL EVERY 6 HOURS PRN
Qty: 16 TABLET | Refills: 0 | Status: SHIPPED | OUTPATIENT
Start: 2023-08-19 | End: 2023-08-24

## 2023-08-20 NOTE — ED QUICK NOTES
Rounding Completed. Patient is resting in bed, respirations are even and unlabored. No signs of distress noted at this time. Plan of care ongoing. Waiting for xray results. Elimination needs assessed. Bed is locked and in lowest position. Call light within reach.

## 2023-08-20 NOTE — RESPIRATORY THERAPY NOTE
Incentive spirometry done. Goal at least 1000 mL. 875 mL performed. Effort good. Instructions given.

## 2023-08-20 NOTE — ED INITIAL ASSESSMENT (HPI)
Mechanical fall around 1600 this afternoon. Paralee [place] against bench and broke it. C/o of right rib and flank pain and skin tears noted to the right arm and left wrist which are covered. Some bruising and redness noted to the back.  No blood thinner use

## 2024-05-10 PROBLEM — D72.829 LEUKOCYTOSIS: Status: ACTIVE | Noted: 2024-05-10

## 2024-05-10 PROBLEM — H35.371 EPIRETINAL MEMBRANE (ERM) OF RIGHT EYE: Status: ACTIVE | Noted: 2018-08-24

## 2024-05-10 PROBLEM — H02.834 DERMATOCHALASIS OF BOTH UPPER EYELIDS: Status: ACTIVE | Noted: 2021-12-01

## 2024-05-10 PROBLEM — I10 ESSENTIAL HYPERTENSION: Status: ACTIVE | Noted: 2017-04-24

## 2024-05-10 PROBLEM — H02.831 DERMATOCHALASIS OF BOTH UPPER EYELIDS: Status: ACTIVE | Noted: 2021-12-01

## 2024-05-10 PROBLEM — E87.1 HYPONATREMIA: Status: ACTIVE | Noted: 2024-05-10

## 2024-05-10 PROBLEM — R55 SYNCOPE AND COLLAPSE: Status: ACTIVE | Noted: 2017-09-11

## 2024-08-13 ENCOUNTER — HOSPITAL ENCOUNTER (INPATIENT)
Age: 89
Discharge: HOME | DRG: 813 | End: 2024-08-13
Attending: STUDENT IN AN ORGANIZED HEALTH CARE EDUCATION/TRAINING PROGRAM | Admitting: INTERNAL MEDICINE

## 2024-08-13 DIAGNOSIS — I10 ESSENTIAL HYPERTENSION: ICD-10-CM

## 2024-08-13 DIAGNOSIS — R55 SYNCOPE AND COLLAPSE: ICD-10-CM

## 2024-08-13 DIAGNOSIS — Z95.1 S/P CABG (CORONARY ARTERY BYPASS GRAFT): ICD-10-CM

## 2024-08-13 DIAGNOSIS — I70.1 RENAL ARTERY STENOSIS (CMD): ICD-10-CM

## 2024-08-13 DIAGNOSIS — E78.5 DYSLIPIDEMIA: ICD-10-CM

## 2024-08-13 DIAGNOSIS — K92.2 GASTROINTESTINAL HEMORRHAGE, UNSPECIFIED GASTROINTESTINAL HEMORRHAGE TYPE: ICD-10-CM

## 2024-08-13 DIAGNOSIS — I77.9 CAROTID ARTERY DISEASE, UNSPECIFIED LATERALITY, UNSPECIFIED TYPE (CMD): ICD-10-CM

## 2024-08-13 DIAGNOSIS — K62.5 BRIGHT RED BLOOD PER RECTUM: Primary | ICD-10-CM

## 2024-08-13 LAB
ABO + RH BLD: NORMAL
ALBUMIN SERPL-MCNC: 3 G/DL (ref 3.6–5.1)
ALBUMIN/GLOB SERPL: 0.9 {RATIO} (ref 1–2.4)
ALP SERPL-CCNC: 71 UNITS/L (ref 45–117)
ALT SERPL-CCNC: 23 UNITS/L
ANION GAP SERPL CALC-SCNC: 9 MMOL/L (ref 7–19)
APTT PPP: 29 SEC (ref 22–32)
AST SERPL-CCNC: 32 UNITS/L
BASOPHILS # BLD: 0.1 K/MCL (ref 0–0.3)
BASOPHILS NFR BLD: 1 %
BILIRUB SERPL-MCNC: 0.6 MG/DL (ref 0.2–1)
BLD GP AB SCN SERPL QL GEL: NEGATIVE
BUN SERPL-MCNC: 24 MG/DL (ref 6–20)
BUN/CREAT SERPL: 25 (ref 7–25)
CALCIUM SERPL-MCNC: 8.5 MG/DL (ref 8.4–10.2)
CHLORIDE SERPL-SCNC: 104 MMOL/L (ref 97–110)
CO2 SERPL-SCNC: 26 MMOL/L (ref 21–32)
CREAT SERPL-MCNC: 0.96 MG/DL (ref 0.51–0.95)
DEPRECATED RDW RBC: 47.6 FL (ref 39–50)
EGFRCR SERPLBLD CKD-EPI 2021: 54 ML/MIN/{1.73_M2}
EOSINOPHIL # BLD: 0.3 K/MCL (ref 0–0.5)
EOSINOPHIL NFR BLD: 2 %
ERYTHROCYTE [DISTWIDTH] IN BLOOD: 13.9 % (ref 11–15)
FASTING DURATION TIME PATIENT: ABNORMAL H
GLOBULIN SER-MCNC: 3.5 G/DL (ref 2–4)
GLUCOSE SERPL-MCNC: 139 MG/DL (ref 70–99)
HCT VFR BLD CALC: 35.9 % (ref 36–46.5)
HGB BLD-MCNC: 11.7 G/DL (ref 12–15.5)
IMM GRANULOCYTES # BLD AUTO: 0.1 K/MCL (ref 0–0.2)
IMM GRANULOCYTES # BLD: 1 %
INR PPP: 1.1
LYMPHOCYTES # BLD: 1.4 K/MCL (ref 1–4)
LYMPHOCYTES NFR BLD: 10 %
MCH RBC QN AUTO: 30.5 PG (ref 26–34)
MCHC RBC AUTO-ENTMCNC: 32.6 G/DL (ref 32–36.5)
MCV RBC AUTO: 93.5 FL (ref 78–100)
MONOCYTES # BLD: 1.5 K/MCL (ref 0.3–0.9)
MONOCYTES NFR BLD: 11 %
NEUTROPHILS # BLD: 10.5 K/MCL (ref 1.8–7.7)
NEUTROPHILS NFR BLD: 75 %
NRBC BLD MANUAL-RTO: 0 /100 WBC
PLATELET # BLD AUTO: 595 K/MCL (ref 140–450)
POTASSIUM SERPL-SCNC: 4.8 MMOL/L (ref 3.4–5.1)
PROT SERPL-MCNC: 6.5 G/DL (ref 6.4–8.2)
PROTHROMBIN TIME: 11.8 SEC (ref 9.7–11.8)
RBC # BLD: 3.84 MIL/MCL (ref 4–5.2)
SODIUM SERPL-SCNC: 134 MMOL/L (ref 135–145)
TYPE AND SCREEN EXPIRATION DATE: NORMAL
WBC # BLD: 14 K/MCL (ref 4.2–11)

## 2024-08-13 PROCEDURE — 36415 COLL VENOUS BLD VENIPUNCTURE: CPT

## 2024-08-13 PROCEDURE — 80053 COMPREHEN METABOLIC PANEL: CPT | Performed by: STUDENT IN AN ORGANIZED HEALTH CARE EDUCATION/TRAINING PROGRAM

## 2024-08-13 PROCEDURE — 85025 COMPLETE CBC W/AUTO DIFF WBC: CPT | Performed by: EMERGENCY MEDICINE

## 2024-08-13 PROCEDURE — 10006031 HB ROOM CHARGE TELEMETRY

## 2024-08-13 PROCEDURE — 85610 PROTHROMBIN TIME: CPT | Performed by: STUDENT IN AN ORGANIZED HEALTH CARE EDUCATION/TRAINING PROGRAM

## 2024-08-13 PROCEDURE — 85730 THROMBOPLASTIN TIME PARTIAL: CPT | Performed by: STUDENT IN AN ORGANIZED HEALTH CARE EDUCATION/TRAINING PROGRAM

## 2024-08-13 PROCEDURE — 86850 RBC ANTIBODY SCREEN: CPT | Performed by: STUDENT IN AN ORGANIZED HEALTH CARE EDUCATION/TRAINING PROGRAM

## 2024-08-13 PROCEDURE — 82272 OCCULT BLD FECES 1-3 TESTS: CPT

## 2024-08-13 PROCEDURE — 99284 EMERGENCY DEPT VISIT MOD MDM: CPT

## 2024-08-13 RX ORDER — ACETAMINOPHEN 325 MG/1
650 TABLET ORAL EVERY 4 HOURS PRN
Status: DISCONTINUED | OUTPATIENT
Start: 2024-08-13 | End: 2024-08-19 | Stop reason: HOSPADM

## 2024-08-13 RX ORDER — POLYETHYLENE GLYCOL 3350 17 G/17G
17 POWDER, FOR SOLUTION ORAL DAILY PRN
Status: DISCONTINUED | OUTPATIENT
Start: 2024-08-13 | End: 2024-08-19 | Stop reason: HOSPADM

## 2024-08-13 RX ORDER — 0.9 % SODIUM CHLORIDE 0.9 %
2 VIAL (ML) INJECTION EVERY 12 HOURS SCHEDULED
Status: DISCONTINUED | OUTPATIENT
Start: 2024-08-14 | End: 2024-08-19 | Stop reason: HOSPADM

## 2024-08-13 RX ORDER — LANOLIN ALCOHOL/MO/W.PET/CERES
3 CREAM (GRAM) TOPICAL NIGHTLY PRN
Status: DISCONTINUED | OUTPATIENT
Start: 2024-08-13 | End: 2024-08-19 | Stop reason: HOSPADM

## 2024-08-13 RX ORDER — ONDANSETRON 4 MG/1
4 TABLET, ORALLY DISINTEGRATING ORAL EVERY 12 HOURS PRN
Status: DISCONTINUED | OUTPATIENT
Start: 2024-08-13 | End: 2024-08-19 | Stop reason: HOSPADM

## 2024-08-13 RX ORDER — ACETAMINOPHEN 650 MG/1
650 SUPPOSITORY RECTAL EVERY 4 HOURS PRN
Status: DISCONTINUED | OUTPATIENT
Start: 2024-08-13 | End: 2024-08-19 | Stop reason: HOSPADM

## 2024-08-13 RX ORDER — CALCIUM CARBONATE 500 MG/1
500 TABLET, CHEWABLE ORAL EVERY 4 HOURS PRN
Status: DISCONTINUED | OUTPATIENT
Start: 2024-08-13 | End: 2024-08-19 | Stop reason: HOSPADM

## 2024-08-13 RX ORDER — ONDANSETRON 2 MG/ML
4 INJECTION INTRAMUSCULAR; INTRAVENOUS EVERY 6 HOURS PRN
Status: DISCONTINUED | OUTPATIENT
Start: 2024-08-13 | End: 2024-08-19 | Stop reason: HOSPADM

## 2024-08-13 RX ORDER — AMOXICILLIN 250 MG
2 CAPSULE ORAL 2 TIMES DAILY PRN
Status: DISCONTINUED | OUTPATIENT
Start: 2024-08-13 | End: 2024-08-19 | Stop reason: HOSPADM

## 2024-08-13 RX ORDER — SODIUM CHLORIDE 9 MG/ML
INJECTION, SOLUTION INTRAVENOUS CONTINUOUS
Status: DISCONTINUED | OUTPATIENT
Start: 2024-08-14 | End: 2024-08-15

## 2024-08-13 SDOH — SOCIAL STABILITY: SOCIAL NETWORK
HOW OFTEN DO YOU SEE OR TALK TO PEOPLE THAT YOU CARE ABOUT AND FEEL CLOSE TO? (FOR EXAMPLE: TALKING TO FRIENDS ON THE PHONE, VISITING FRIENDS OR FAMILY, GOING TO CHURCH OR CLUB MEETINGS): 5 OR MORE TIMES A WEEK

## 2024-08-13 SDOH — ECONOMIC STABILITY: FOOD INSECURITY: WITHIN THE PAST 12 MONTHS, THE FOOD YOU BOUGHT JUST DIDN'T LAST AND YOU DIDN'T HAVE MONEY TO GET MORE.: NEVER TRUE

## 2024-08-13 ASSESSMENT — LIFESTYLE VARIABLES
AUDIT-C TOTAL SCORE: 0
HOW OFTEN DO YOU HAVE A DRINK CONTAINING ALCOHOL: NEVER
ALCOHOL_USE_STATUS: NO OR LOW RISK WITH VALIDATED TOOL
HOW OFTEN DO YOU HAVE 6 OR MORE DRINKS ON ONE OCCASION: NEVER
HOW MANY STANDARD DRINKS CONTAINING ALCOHOL DO YOU HAVE ON A TYPICAL DAY: 0,1 OR 2

## 2024-08-13 ASSESSMENT — ENCOUNTER SYMPTOMS: HEMATOCHEZIA: 1

## 2024-08-13 ASSESSMENT — ACTIVITIES OF DAILY LIVING (ADL)
ADL_SCORE: 12
ADL_BEFORE_ADMISSION: INDEPENDENT
RECENT_DECLINE_ADL: NO
ADL_SHORT_OF_BREATH: NO

## 2024-08-13 ASSESSMENT — PAIN SCALES - GENERAL: PAINLEVEL_OUTOF10: 0

## 2024-08-14 LAB
ALBUMIN SERPL-MCNC: 2.4 G/DL (ref 3.6–5.1)
ALBUMIN/GLOB SERPL: 1 {RATIO} (ref 1–2.4)
ALP SERPL-CCNC: 56 UNITS/L (ref 45–117)
ALT SERPL-CCNC: 18 UNITS/L
ANION GAP SERPL CALC-SCNC: 9 MMOL/L (ref 7–19)
AST SERPL-CCNC: 16 UNITS/L
BASOPHILS # BLD: 0.1 K/MCL (ref 0–0.3)
BASOPHILS NFR BLD: 1 %
BILIRUB SERPL-MCNC: 0.5 MG/DL (ref 0.2–1)
BUN SERPL-MCNC: 21 MG/DL (ref 6–20)
BUN/CREAT SERPL: 27 (ref 7–25)
CALCIUM SERPL-MCNC: 8 MG/DL (ref 8.4–10.2)
CHLORIDE SERPL-SCNC: 107 MMOL/L (ref 97–110)
CO2 SERPL-SCNC: 25 MMOL/L (ref 21–32)
CREAT SERPL-MCNC: 0.77 MG/DL (ref 0.51–0.95)
DEPRECATED RDW RBC: 48.6 FL (ref 39–50)
DEPRECATED RDW RBC: 49.5 FL (ref 39–50)
EGFRCR SERPLBLD CKD-EPI 2021: 71 ML/MIN/{1.73_M2}
EOSINOPHIL # BLD: 0.3 K/MCL (ref 0–0.5)
EOSINOPHIL NFR BLD: 3 %
ERYTHROCYTE [DISTWIDTH] IN BLOOD: 14 % (ref 11–15)
ERYTHROCYTE [DISTWIDTH] IN BLOOD: 14.1 % (ref 11–15)
FASTING DURATION TIME PATIENT: ABNORMAL H
GLOBULIN SER-MCNC: 2.5 G/DL (ref 2–4)
GLUCOSE BLDC GLUCOMTR-MCNC: 93 MG/DL (ref 70–99)
GLUCOSE SERPL-MCNC: 97 MG/DL (ref 70–99)
HCT VFR BLD CALC: 28.1 % (ref 36–46.5)
HCT VFR BLD CALC: 30.7 % (ref 36–46.5)
HGB BLD-MCNC: 9.2 G/DL (ref 12–15.5)
HGB BLD-MCNC: 9.9 G/DL (ref 12–15.5)
IMM GRANULOCYTES # BLD AUTO: 0.1 K/MCL (ref 0–0.2)
IMM GRANULOCYTES # BLD: 0 %
LYMPHOCYTES # BLD: 2.3 K/MCL (ref 1–4)
LYMPHOCYTES NFR BLD: 19 %
MAGNESIUM SERPL-MCNC: 2 MG/DL (ref 1.7–2.4)
MCH RBC QN AUTO: 31 PG (ref 26–34)
MCH RBC QN AUTO: 31.1 PG (ref 26–34)
MCHC RBC AUTO-ENTMCNC: 32.2 G/DL (ref 32–36.5)
MCHC RBC AUTO-ENTMCNC: 32.7 G/DL (ref 32–36.5)
MCV RBC AUTO: 94.9 FL (ref 78–100)
MCV RBC AUTO: 96.2 FL (ref 78–100)
MONOCYTES # BLD: 1.3 K/MCL (ref 0.3–0.9)
MONOCYTES NFR BLD: 10 %
NEUTROPHILS # BLD: 8.5 K/MCL (ref 1.8–7.7)
NEUTROPHILS NFR BLD: 67 %
NRBC BLD MANUAL-RTO: 0 /100 WBC
NRBC BLD MANUAL-RTO: 0 /100 WBC
PLATELET # BLD AUTO: 448 K/MCL (ref 140–450)
PLATELET # BLD AUTO: 502 K/MCL (ref 140–450)
POTASSIUM SERPL-SCNC: 3.9 MMOL/L (ref 3.4–5.1)
PROT SERPL-MCNC: 4.9 G/DL (ref 6.4–8.2)
RAINBOW EXTRA TUBES HOLD SPECIMEN: NORMAL
RBC # BLD: 2.96 MIL/MCL (ref 4–5.2)
RBC # BLD: 3.19 MIL/MCL (ref 4–5.2)
SODIUM SERPL-SCNC: 137 MMOL/L (ref 135–145)
WBC # BLD: 11.6 K/MCL (ref 4.2–11)
WBC # BLD: 12.6 K/MCL (ref 4.2–11)

## 2024-08-14 PROCEDURE — 83735 ASSAY OF MAGNESIUM: CPT | Performed by: INTERNAL MEDICINE

## 2024-08-14 PROCEDURE — 99223 1ST HOSP IP/OBS HIGH 75: CPT | Performed by: INTERNAL MEDICINE

## 2024-08-14 PROCEDURE — 85025 COMPLETE CBC W/AUTO DIFF WBC: CPT

## 2024-08-14 PROCEDURE — 85027 COMPLETE CBC AUTOMATED: CPT | Performed by: INTERNAL MEDICINE

## 2024-08-14 PROCEDURE — 36415 COLL VENOUS BLD VENIPUNCTURE: CPT | Performed by: INTERNAL MEDICINE

## 2024-08-14 PROCEDURE — 10002803 HB RX 637: Performed by: FAMILY MEDICINE

## 2024-08-14 PROCEDURE — 80053 COMPREHEN METABOLIC PANEL: CPT | Performed by: INTERNAL MEDICINE

## 2024-08-14 PROCEDURE — 10002807 HB RX 258: Performed by: INTERNAL MEDICINE

## 2024-08-14 PROCEDURE — 93005 ELECTROCARDIOGRAM TRACING: CPT | Performed by: INTERNAL MEDICINE

## 2024-08-14 PROCEDURE — 93010 ELECTROCARDIOGRAM REPORT: CPT | Performed by: INTERNAL MEDICINE

## 2024-08-14 PROCEDURE — 10004651 HB RX, NO CHARGE ITEM: Performed by: INTERNAL MEDICINE

## 2024-08-14 PROCEDURE — 10002800 HB RX 250 W HCPCS: Performed by: INTERNAL MEDICINE

## 2024-08-14 PROCEDURE — 10000002 HB ROOM CHARGE MED SURG

## 2024-08-14 PROCEDURE — 99223 1ST HOSP IP/OBS HIGH 75: CPT

## 2024-08-14 RX ORDER — HYDRALAZINE HYDROCHLORIDE 25 MG/1
25 TABLET, FILM COATED ORAL EVERY 6 HOURS PRN
Status: DISCONTINUED | OUTPATIENT
Start: 2024-08-14 | End: 2024-08-19 | Stop reason: HOSPADM

## 2024-08-14 RX ORDER — POTASSIUM CHLORIDE 14.9 MG/ML
20 INJECTION INTRAVENOUS ONCE
Status: COMPLETED | OUTPATIENT
Start: 2024-08-14 | End: 2024-08-14

## 2024-08-14 RX ADMIN — POTASSIUM CHLORIDE 20 MEQ: 14.9 INJECTION, SOLUTION INTRAVENOUS at 12:12

## 2024-08-14 RX ADMIN — HYDRALAZINE HYDROCHLORIDE 25 MG: 25 TABLET ORAL at 00:51

## 2024-08-14 RX ADMIN — HYDRALAZINE HYDROCHLORIDE 25 MG: 25 TABLET ORAL at 19:50

## 2024-08-14 RX ADMIN — POTASSIUM CHLORIDE 20 MEQ: 14.9 INJECTION, SOLUTION INTRAVENOUS at 14:41

## 2024-08-14 RX ADMIN — SODIUM CHLORIDE: 9 INJECTION, SOLUTION INTRAVENOUS at 00:53

## 2024-08-14 RX ADMIN — SODIUM CHLORIDE, PRESERVATIVE FREE 2 ML: 5 INJECTION INTRAVENOUS at 21:27

## 2024-08-14 RX ADMIN — SODIUM CHLORIDE: 9 INJECTION, SOLUTION INTRAVENOUS at 14:28

## 2024-08-14 RX ADMIN — SODIUM CHLORIDE, PRESERVATIVE FREE 2 ML: 5 INJECTION INTRAVENOUS at 00:52

## 2024-08-14 SDOH — HEALTH STABILITY: GENERAL
BECAUSE OF A PHYSICAL, MENTAL, OR EMOTIONAL CONDITION, DO YOU HAVE SERIOUS DIFFICULTY CONCENTRATING, REMEMBERING OR MAKING DECISIONS?: NO

## 2024-08-14 SDOH — ECONOMIC STABILITY: GENERAL

## 2024-08-14 SDOH — ECONOMIC STABILITY: TRANSPORTATION INSECURITY
IN THE PAST 12 MONTHS, HAS LACK OF RELIABLE TRANSPORTATION KEPT YOU FROM MEDICAL APPOINTMENTS, MEETINGS, WORK OR FROM GETTING THINGS NEEDED FOR DAILY LIVING?: YES

## 2024-08-14 SDOH — ECONOMIC STABILITY: HOUSING INSECURITY: WHAT IS YOUR LIVING SITUATION TODAY?: I HAVE A STEADY PLACE TO LIVE

## 2024-08-14 SDOH — SOCIAL STABILITY: SOCIAL INSECURITY: HOW OFTEN DOES ANYONE, INCLUDING FAMILY AND FRIENDS, THREATEN YOU WITH HARM?: NEVER

## 2024-08-14 SDOH — ECONOMIC STABILITY: GENERAL: WOULD YOU LIKE HELP WITH ANY OF THE FOLLOWING NEEDS?: I DON'T WANT HELP WITH ANY OF THESE

## 2024-08-14 SDOH — HEALTH STABILITY: GENERAL: BECAUSE OF A PHYSICAL, MENTAL, OR EMOTIONAL CONDITION, DO YOU HAVE DIFFICULTY DOING ERRANDS ALONE?: NO

## 2024-08-14 SDOH — ECONOMIC STABILITY: HOUSING INSECURITY: WHAT IS YOUR LIVING SITUATION TODAY?: ALONE

## 2024-08-14 SDOH — SOCIAL STABILITY: SOCIAL INSECURITY: HOW OFTEN DOES ANYONE, INCLUDING FAMILY AND FRIENDS, SCREAM OR CURSE AT YOU?: NEVER

## 2024-08-14 SDOH — ECONOMIC STABILITY: INCOME INSECURITY: IN THE PAST 12 MONTHS, HAS THE ELECTRIC, GAS, OIL, OR WATER COMPANY THREATENED TO SHUT OFF SERVICE IN YOUR HOME?: NO

## 2024-08-14 SDOH — HEALTH STABILITY: PHYSICAL HEALTH: DO YOU HAVE DIFFICULTY DRESSING OR BATHING?: NO

## 2024-08-14 SDOH — SOCIAL STABILITY: SOCIAL NETWORK: SUPPORT SYSTEMS: FAMILY MEMBERS;CHURCH/FAITH COMMUNITY;FRIENDS

## 2024-08-14 SDOH — ECONOMIC STABILITY: HOUSING INSECURITY: DO YOU HAVE PROBLEMS WITH ANY OF THE FOLLOWING?: NONE OF THE ABOVE

## 2024-08-14 SDOH — ECONOMIC STABILITY: HOUSING INSECURITY: WHAT IS YOUR LIVING SITUATION TODAY?: LONG TERM CARE FACILITY

## 2024-08-14 SDOH — SOCIAL STABILITY: SOCIAL INSECURITY: HOW OFTEN DOES ANYONE, INCLUDING FAMILY AND FRIENDS, INSULT OR TALK DOWN TO YOU?: NEVER

## 2024-08-14 SDOH — SOCIAL STABILITY: SOCIAL INSECURITY: HOW OFTEN DOES ANYONE, INCLUDING FAMILY AND FRIENDS, PHYSICALLY HURT YOU?: FREQUENTLY

## 2024-08-14 SDOH — HEALTH STABILITY: PHYSICAL HEALTH: DO YOU HAVE SERIOUS DIFFICULTY WALKING OR CLIMBING STAIRS?: NO

## 2024-08-14 ASSESSMENT — PATIENT HEALTH QUESTIONNAIRE - PHQ9
IS PATIENT ABLE TO COMPLETE PHQ2 OR PHQ9: YES
CLINICAL INTERPRETATION OF PHQ2 SCORE: NO FURTHER SCREENING NEEDED
SUM OF ALL RESPONSES TO PHQ9 QUESTIONS 1 AND 2: 0
2. FEELING DOWN, DEPRESSED OR HOPELESS: NOT AT ALL
1. LITTLE INTEREST OR PLEASURE IN DOING THINGS: NOT AT ALL
SUM OF ALL RESPONSES TO PHQ9 QUESTIONS 1 AND 2: 0

## 2024-08-14 ASSESSMENT — PAIN SCALES - GENERAL
PAINLEVEL_OUTOF10: 0

## 2024-08-14 ASSESSMENT — ENCOUNTER SYMPTOMS
PSYCHIATRIC NEGATIVE: 1
EYES NEGATIVE: 1
NEUROLOGICAL NEGATIVE: 1
CONSTITUTIONAL NEGATIVE: 1
RESPIRATORY NEGATIVE: 1
BLOOD IN STOOL: 1

## 2024-08-14 ASSESSMENT — ORIENTATION MEMORY CONCENTRATION TEST (OMCT)
REPEAT THE NAME AND ADDRESS I ASKED YOU TO REMEMBER: CORRECT
SAY THE MONTHS IN REVERSE ORDER STARTING WITH LAST MONTH: 1 ERROR
COUNT BACKWARDS FROM 20 TO 1: CORRECT
WHAT MONTH IS IT NOW: CORRECT
WHAT YEAR IS IT NOW (MUST BE EXACT): CORRECT
WHAT TIME IS IT (NO WATCH OR CLOCK): CORRECT
OMCT SCORE: 2
OMCT INTERPRETATION: 0-6: NO SIGNIFICANT IMPAIRMENT

## 2024-08-14 ASSESSMENT — COLUMBIA-SUICIDE SEVERITY RATING SCALE - C-SSRS
IS THE PATIENT ABLE TO COMPLETE C-SSRS: YES
2. HAVE YOU ACTUALLY HAD ANY THOUGHTS OF KILLING YOURSELF?: NO
6. HAVE YOU EVER DONE ANYTHING, STARTED TO DO ANYTHING, OR PREPARED TO DO ANYTHING TO END YOUR LIFE?: NO
1. WITHIN THE PAST MONTH, HAVE YOU WISHED YOU WERE DEAD OR WISHED YOU COULD GO TO SLEEP AND NOT WAKE UP?: NO

## 2024-08-14 ASSESSMENT — LIFESTYLE VARIABLES
HOW MANY STANDARD DRINKS CONTAINING ALCOHOL DO YOU HAVE ON A TYPICAL DAY: 0,1 OR 2
ALCOHOL_USE_STATUS: NO OR LOW RISK WITH VALIDATED TOOL
HOW OFTEN DO YOU HAVE A DRINK CONTAINING ALCOHOL: NEVER

## 2024-08-14 ASSESSMENT — COGNITIVE AND FUNCTIONAL STATUS - GENERAL
DO YOU HAVE DIFFICULTY DRESSING OR BATHING: NO
DO YOU HAVE SERIOUS DIFFICULTY WALKING OR CLIMBING STAIRS: NO

## 2024-08-15 ENCOUNTER — APPOINTMENT (OUTPATIENT)
Dept: CT IMAGING | Age: 89
DRG: 813 | End: 2024-08-15
Attending: INTERNAL MEDICINE

## 2024-08-15 LAB
ANION GAP SERPL CALC-SCNC: 7 MMOL/L (ref 7–19)
ATRIAL RATE (BPM): 77
BUN SERPL-MCNC: 19 MG/DL (ref 6–20)
BUN/CREAT SERPL: 28 (ref 7–25)
CALCIUM SERPL-MCNC: 7.8 MG/DL (ref 8.4–10.2)
CHLORIDE SERPL-SCNC: 109 MMOL/L (ref 97–110)
CO2 SERPL-SCNC: 24 MMOL/L (ref 21–32)
CREAT SERPL-MCNC: 0.68 MG/DL (ref 0.51–0.95)
DEPRECATED RDW RBC: 49.1 FL (ref 39–50)
EGFRCR SERPLBLD CKD-EPI 2021: 80 ML/MIN/{1.73_M2}
ERYTHROCYTE [DISTWIDTH] IN BLOOD: 14.1 % (ref 11–15)
FASTING DURATION TIME PATIENT: ABNORMAL H
GLUCOSE SERPL-MCNC: 92 MG/DL (ref 70–99)
HCT VFR BLD CALC: 23.6 % (ref 36–46.5)
HCT VFR BLD CALC: 25.1 % (ref 36–46.5)
HGB BLD-MCNC: 7.7 G/DL (ref 12–15.5)
HGB BLD-MCNC: 8.2 G/DL (ref 12–15.5)
MCH RBC QN AUTO: 31.3 PG (ref 26–34)
MCHC RBC AUTO-ENTMCNC: 32.6 G/DL (ref 32–36.5)
MCV RBC AUTO: 95.9 FL (ref 78–100)
NRBC BLD MANUAL-RTO: 0 /100 WBC
P AXIS (DEGREES): 62
PLATELET # BLD AUTO: 426 K/MCL (ref 140–450)
POTASSIUM SERPL-SCNC: 4.4 MMOL/L (ref 3.4–5.1)
PR-INTERVAL (MSEC): 190
QRS-INTERVAL (MSEC): 76
QT-INTERVAL (MSEC): 404
QTC: 457
R AXIS (DEGREES): -24
RBC # BLD: 2.46 MIL/MCL (ref 4–5.2)
REPORT TEXT: NORMAL
SODIUM SERPL-SCNC: 136 MMOL/L (ref 135–145)
T AXIS (DEGREES): -4
VENTRICULAR RATE EKG/MIN (BPM): 77
WBC # BLD: 11.4 K/MCL (ref 4.2–11)

## 2024-08-15 PROCEDURE — 10004651 HB RX, NO CHARGE ITEM: Performed by: INTERNAL MEDICINE

## 2024-08-15 PROCEDURE — 80048 BASIC METABOLIC PNL TOTAL CA: CPT | Performed by: INTERNAL MEDICINE

## 2024-08-15 PROCEDURE — 99233 SBSQ HOSP IP/OBS HIGH 50: CPT | Performed by: STUDENT IN AN ORGANIZED HEALTH CARE EDUCATION/TRAINING PROGRAM

## 2024-08-15 PROCEDURE — 10000002 HB ROOM CHARGE MED SURG

## 2024-08-15 PROCEDURE — 10002805 HB CONTRAST AGENT: Performed by: INTERNAL MEDICINE

## 2024-08-15 PROCEDURE — 74174 CTA ABD&PLVS W/CONTRAST: CPT

## 2024-08-15 PROCEDURE — 10002807 HB RX 258: Performed by: INTERNAL MEDICINE

## 2024-08-15 PROCEDURE — 85027 COMPLETE CBC AUTOMATED: CPT | Performed by: INTERNAL MEDICINE

## 2024-08-15 PROCEDURE — 10002803 HB RX 637: Performed by: STUDENT IN AN ORGANIZED HEALTH CARE EDUCATION/TRAINING PROGRAM

## 2024-08-15 PROCEDURE — 99233 SBSQ HOSP IP/OBS HIGH 50: CPT | Performed by: INTERNAL MEDICINE

## 2024-08-15 PROCEDURE — 36415 COLL VENOUS BLD VENIPUNCTURE: CPT | Performed by: INTERNAL MEDICINE

## 2024-08-15 PROCEDURE — 85014 HEMATOCRIT: CPT | Performed by: INTERNAL MEDICINE

## 2024-08-15 RX ADMIN — SODIUM CHLORIDE: 9 INJECTION, SOLUTION INTRAVENOUS at 04:36

## 2024-08-15 RX ADMIN — IOHEXOL 80 ML: 350 INJECTION, SOLUTION INTRAVENOUS at 13:00

## 2024-08-15 RX ADMIN — SODIUM CHLORIDE, PRESERVATIVE FREE 2 ML: 5 INJECTION INTRAVENOUS at 21:27

## 2024-08-15 ASSESSMENT — PAIN SCALES - GENERAL
PAINLEVEL_OUTOF10: 0
PAINLEVEL_OUTOF10: 0

## 2024-08-16 LAB
ANION GAP SERPL CALC-SCNC: 9 MMOL/L (ref 7–19)
BUN SERPL-MCNC: 18 MG/DL (ref 6–20)
BUN/CREAT SERPL: 24 (ref 7–25)
CALCIUM SERPL-MCNC: 7.6 MG/DL (ref 8.4–10.2)
CHLORIDE SERPL-SCNC: 109 MMOL/L (ref 97–110)
CO2 SERPL-SCNC: 23 MMOL/L (ref 21–32)
CREAT SERPL-MCNC: 0.74 MG/DL (ref 0.51–0.95)
DEPRECATED RDW RBC: 49.3 FL (ref 39–50)
EGFRCR SERPLBLD CKD-EPI 2021: 74 ML/MIN/{1.73_M2}
ERYTHROCYTE [DISTWIDTH] IN BLOOD: 14.4 % (ref 11–15)
FASTING DURATION TIME PATIENT: ABNORMAL H
GLUCOSE SERPL-MCNC: 98 MG/DL (ref 70–99)
HCT VFR BLD CALC: 21.9 % (ref 36–46.5)
HCT VFR BLD CALC: 23.9 % (ref 36–46.5)
HGB BLD-MCNC: 7.2 G/DL (ref 12–15.5)
HGB BLD-MCNC: 7.6 G/DL (ref 12–15.5)
MCH RBC QN AUTO: 30.9 PG (ref 26–34)
MCHC RBC AUTO-ENTMCNC: 32.9 G/DL (ref 32–36.5)
MCV RBC AUTO: 94 FL (ref 78–100)
NRBC BLD MANUAL-RTO: 0 /100 WBC
PLATELET # BLD AUTO: 465 K/MCL (ref 140–450)
POTASSIUM SERPL-SCNC: 3.8 MMOL/L (ref 3.4–5.1)
RBC # BLD: 2.33 MIL/MCL (ref 4–5.2)
SODIUM SERPL-SCNC: 137 MMOL/L (ref 135–145)
WBC # BLD: 10.7 K/MCL (ref 4.2–11)

## 2024-08-16 PROCEDURE — 85027 COMPLETE CBC AUTOMATED: CPT | Performed by: INTERNAL MEDICINE

## 2024-08-16 PROCEDURE — 85014 HEMATOCRIT: CPT | Performed by: INTERNAL MEDICINE

## 2024-08-16 PROCEDURE — 36415 COLL VENOUS BLD VENIPUNCTURE: CPT | Performed by: INTERNAL MEDICINE

## 2024-08-16 PROCEDURE — 97535 SELF CARE MNGMENT TRAINING: CPT

## 2024-08-16 PROCEDURE — 99233 SBSQ HOSP IP/OBS HIGH 50: CPT | Performed by: INTERNAL MEDICINE

## 2024-08-16 PROCEDURE — 97165 OT EVAL LOW COMPLEX 30 MIN: CPT

## 2024-08-16 PROCEDURE — 97166 OT EVAL MOD COMPLEX 45 MIN: CPT

## 2024-08-16 PROCEDURE — 99233 SBSQ HOSP IP/OBS HIGH 50: CPT | Performed by: STUDENT IN AN ORGANIZED HEALTH CARE EDUCATION/TRAINING PROGRAM

## 2024-08-16 PROCEDURE — 80048 BASIC METABOLIC PNL TOTAL CA: CPT | Performed by: INTERNAL MEDICINE

## 2024-08-16 PROCEDURE — 10004651 HB RX, NO CHARGE ITEM: Performed by: INTERNAL MEDICINE

## 2024-08-16 PROCEDURE — 10002803 HB RX 637: Performed by: INTERNAL MEDICINE

## 2024-08-16 PROCEDURE — 10002803 HB RX 637: Performed by: STUDENT IN AN ORGANIZED HEALTH CARE EDUCATION/TRAINING PROGRAM

## 2024-08-16 PROCEDURE — 10000002 HB ROOM CHARGE MED SURG

## 2024-08-16 RX ORDER — HYDROCORTISONE ACETATE 25 MG/1
25 SUPPOSITORY RECTAL 2 TIMES DAILY
Status: DISCONTINUED | OUTPATIENT
Start: 2024-08-16 | End: 2024-08-19 | Stop reason: HOSPADM

## 2024-08-16 RX ORDER — POTASSIUM CHLORIDE 1.5 G/1.58G
40 POWDER, FOR SOLUTION ORAL ONCE
Status: COMPLETED | OUTPATIENT
Start: 2024-08-16 | End: 2024-08-16

## 2024-08-16 RX ADMIN — SODIUM CHLORIDE, PRESERVATIVE FREE 2 ML: 5 INJECTION INTRAVENOUS at 09:14

## 2024-08-16 RX ADMIN — SODIUM CHLORIDE, PRESERVATIVE FREE 2 ML: 5 INJECTION INTRAVENOUS at 20:47

## 2024-08-16 RX ADMIN — HYDROCORTISONE ACETATE 25 MG: 25 SUPPOSITORY RECTAL at 20:47

## 2024-08-16 RX ADMIN — POTASSIUM CHLORIDE 40 MEQ: 1.5 POWDER, FOR SOLUTION ORAL at 09:13

## 2024-08-16 ASSESSMENT — PAIN SCALES - GENERAL
PAINLEVEL_OUTOF10: 0
PAINLEVEL_OUTOF10: 0

## 2024-08-16 ASSESSMENT — COGNITIVE AND FUNCTIONAL STATUS - GENERAL
HELP NEEDED FOR BATHING: A LITTLE
DAILY_ACTIVITY_RAW_SCORE: 21
HELP NEEDED FOR TOILETING: A LITTLE
HELP NEEDED DRESSING REGULAR LOWER BODY CLOTHING: A LITTLE
DAILY_ACTIVITY_CONVERTED_SCORE: 44.27

## 2024-08-16 ASSESSMENT — ACTIVITIES OF DAILY LIVING (ADL)
EATING: INDEPENDENT
PRIOR_ADL: MODIFIED INDEPENDENT
HOME_MANAGEMENT_TIME_ENTRY: 13
PRIOR_ADL_BATHING: MODIFIED INDEPENDENT
PRIOR_ADL_TOILETING: MODIFIED INDEPENDENT
GROOMING: INDEPENDENT

## 2024-08-17 VITALS
SYSTOLIC BLOOD PRESSURE: 147 MMHG | WEIGHT: 135.8 LBS | DIASTOLIC BLOOD PRESSURE: 62 MMHG | BODY MASS INDEX: 25.64 KG/M2 | HEART RATE: 69 BPM | TEMPERATURE: 97.9 F | OXYGEN SATURATION: 96 % | HEIGHT: 61 IN | RESPIRATION RATE: 16 BRPM

## 2024-08-17 LAB
ABO + RH BLD: NORMAL
ANION GAP SERPL CALC-SCNC: 8 MMOL/L (ref 7–19)
BLD GP AB SCN SERPL QL GEL: NEGATIVE
BLOOD EXPIRATION DATE: NORMAL
BUN SERPL-MCNC: 17 MG/DL (ref 6–20)
BUN/CREAT SERPL: 22 (ref 7–25)
CALCIUM SERPL-MCNC: 8.4 MG/DL (ref 8.4–10.2)
CHLORIDE SERPL-SCNC: 112 MMOL/L (ref 97–110)
CO2 SERPL-SCNC: 22 MMOL/L (ref 21–32)
CREAT SERPL-MCNC: 0.76 MG/DL (ref 0.51–0.95)
CROSSMATCH RESULT: NORMAL
DEPRECATED RDW RBC: 51.7 FL (ref 39–50)
DISPENSE STATUS: NORMAL
EGFRCR SERPLBLD CKD-EPI 2021: 72 ML/MIN/{1.73_M2}
ERYTHROCYTE [DISTWIDTH] IN BLOOD: 14.7 % (ref 11–15)
FASTING DURATION TIME PATIENT: ABNORMAL H
GLUCOSE SERPL-MCNC: 122 MG/DL (ref 70–99)
HCT VFR BLD CALC: 22.3 % (ref 36–46.5)
HCT VFR BLD CALC: 27.4 % (ref 36–46.5)
HGB BLD-MCNC: 7.1 G/DL (ref 12–15.5)
HGB BLD-MCNC: 8.8 G/DL (ref 12–15.5)
ISBT BLOOD TYPE: 9500
ISSUE DATE/TIME: NORMAL
MCH RBC QN AUTO: 30.9 PG (ref 26–34)
MCHC RBC AUTO-ENTMCNC: 31.8 G/DL (ref 32–36.5)
MCV RBC AUTO: 97 FL (ref 78–100)
NRBC BLD MANUAL-RTO: 0 /100 WBC
PLATELET # BLD AUTO: 475 K/MCL (ref 140–450)
POTASSIUM SERPL-SCNC: 3.8 MMOL/L (ref 3.4–5.1)
PRODUCT CODE: NORMAL
PRODUCT DESCRIPTION: NORMAL
PRODUCT ID: NORMAL
RBC # BLD: 2.3 MIL/MCL (ref 4–5.2)
SODIUM SERPL-SCNC: 138 MMOL/L (ref 135–145)
TYPE AND SCREEN EXPIRATION DATE: NORMAL
UNIT BLOOD TYPE: NORMAL
UNIT NUMBER: NORMAL
WBC # BLD: 10 K/MCL (ref 4.2–11)

## 2024-08-17 PROCEDURE — 10004651 HB RX, NO CHARGE ITEM: Performed by: INTERNAL MEDICINE

## 2024-08-17 PROCEDURE — 86900 BLOOD TYPING SEROLOGIC ABO: CPT | Performed by: INTERNAL MEDICINE

## 2024-08-17 PROCEDURE — 80048 BASIC METABOLIC PNL TOTAL CA: CPT | Performed by: INTERNAL MEDICINE

## 2024-08-17 PROCEDURE — 10002803 HB RX 637: Performed by: FAMILY MEDICINE

## 2024-08-17 PROCEDURE — 85014 HEMATOCRIT: CPT | Performed by: INTERNAL MEDICINE

## 2024-08-17 PROCEDURE — 36415 COLL VENOUS BLD VENIPUNCTURE: CPT | Performed by: INTERNAL MEDICINE

## 2024-08-17 PROCEDURE — 99233 SBSQ HOSP IP/OBS HIGH 50: CPT | Performed by: STUDENT IN AN ORGANIZED HEALTH CARE EDUCATION/TRAINING PROGRAM

## 2024-08-17 PROCEDURE — 10002803 HB RX 637: Performed by: INTERNAL MEDICINE

## 2024-08-17 PROCEDURE — 10002803 HB RX 637: Performed by: STUDENT IN AN ORGANIZED HEALTH CARE EDUCATION/TRAINING PROGRAM

## 2024-08-17 PROCEDURE — 86923 COMPATIBILITY TEST ELECTRIC: CPT

## 2024-08-17 PROCEDURE — 10000002 HB ROOM CHARGE MED SURG

## 2024-08-17 PROCEDURE — 99233 SBSQ HOSP IP/OBS HIGH 50: CPT | Performed by: INTERNAL MEDICINE

## 2024-08-17 PROCEDURE — 85027 COMPLETE CBC AUTOMATED: CPT | Performed by: INTERNAL MEDICINE

## 2024-08-17 RX ORDER — PRAVASTATIN SODIUM 40 MG
80 TABLET ORAL DAILY
Status: DISCONTINUED | OUTPATIENT
Start: 2024-08-17 | End: 2024-08-19 | Stop reason: HOSPADM

## 2024-08-17 RX ORDER — METOPROLOL SUCCINATE 50 MG/1
100 TABLET, EXTENDED RELEASE ORAL DAILY
Status: DISCONTINUED | OUTPATIENT
Start: 2024-08-17 | End: 2024-08-19 | Stop reason: HOSPADM

## 2024-08-17 RX ORDER — POTASSIUM CHLORIDE 1500 MG/1
40 TABLET, EXTENDED RELEASE ORAL ONCE
Status: COMPLETED | OUTPATIENT
Start: 2024-08-17 | End: 2024-08-17

## 2024-08-17 RX ORDER — SODIUM CHLORIDE 9 MG/ML
INJECTION, SOLUTION INTRAVENOUS CONTINUOUS PRN
Status: DISCONTINUED | OUTPATIENT
Start: 2024-08-17 | End: 2024-08-19 | Stop reason: HOSPADM

## 2024-08-17 RX ADMIN — POTASSIUM CHLORIDE 40 MEQ: 1500 TABLET, EXTENDED RELEASE ORAL at 08:37

## 2024-08-17 RX ADMIN — HYDRALAZINE HYDROCHLORIDE 25 MG: 25 TABLET ORAL at 21:01

## 2024-08-17 RX ADMIN — SODIUM CHLORIDE, PRESERVATIVE FREE 2 ML: 5 INJECTION INTRAVENOUS at 08:37

## 2024-08-17 RX ADMIN — PRAVASTATIN SODIUM 80 MG: 40 TABLET ORAL at 14:39

## 2024-08-17 RX ADMIN — METOPROLOL SUCCINATE 100 MG: 50 TABLET, EXTENDED RELEASE ORAL at 14:39

## 2024-08-17 RX ADMIN — SODIUM CHLORIDE, PRESERVATIVE FREE 2 ML: 5 INJECTION INTRAVENOUS at 20:57

## 2024-08-17 RX ADMIN — HYDROCORTISONE ACETATE 25 MG: 25 SUPPOSITORY RECTAL at 20:57

## 2024-08-17 ASSESSMENT — PAIN SCALES - GENERAL
PAINLEVEL_OUTOF10: 0

## 2024-08-18 LAB
DEPRECATED RDW RBC: 62.3 FL (ref 39–50)
ERYTHROCYTE [DISTWIDTH] IN BLOOD: 19 % (ref 11–15)
HCT VFR BLD CALC: 27.7 % (ref 36–46.5)
HGB BLD-MCNC: 9 G/DL (ref 12–15.5)
MCH RBC QN AUTO: 28.9 PG (ref 26–34)
MCHC RBC AUTO-ENTMCNC: 32.5 G/DL (ref 32–36.5)
MCV RBC AUTO: 89.1 FL (ref 78–100)
NRBC BLD MANUAL-RTO: 0 /100 WBC
PLATELET # BLD AUTO: 522 K/MCL (ref 140–450)
POTASSIUM SERPL-SCNC: 3.8 MMOL/L (ref 3.4–5.1)
RBC # BLD: 3.11 MIL/MCL (ref 4–5.2)
WBC # BLD: 11.8 K/MCL (ref 4.2–11)

## 2024-08-18 PROCEDURE — 10004651 HB RX, NO CHARGE ITEM: Performed by: INTERNAL MEDICINE

## 2024-08-18 PROCEDURE — 10002803 HB RX 637: Performed by: INTERNAL MEDICINE

## 2024-08-18 PROCEDURE — 85027 COMPLETE CBC AUTOMATED: CPT | Performed by: INTERNAL MEDICINE

## 2024-08-18 PROCEDURE — 97161 PT EVAL LOW COMPLEX 20 MIN: CPT

## 2024-08-18 PROCEDURE — 99233 SBSQ HOSP IP/OBS HIGH 50: CPT | Performed by: STUDENT IN AN ORGANIZED HEALTH CARE EDUCATION/TRAINING PROGRAM

## 2024-08-18 PROCEDURE — 10000002 HB ROOM CHARGE MED SURG

## 2024-08-18 PROCEDURE — 36415 COLL VENOUS BLD VENIPUNCTURE: CPT | Performed by: INTERNAL MEDICINE

## 2024-08-18 PROCEDURE — 84132 ASSAY OF SERUM POTASSIUM: CPT | Performed by: INTERNAL MEDICINE

## 2024-08-18 PROCEDURE — 99233 SBSQ HOSP IP/OBS HIGH 50: CPT | Performed by: INTERNAL MEDICINE

## 2024-08-18 PROCEDURE — 10002803 HB RX 637: Performed by: FAMILY MEDICINE

## 2024-08-18 PROCEDURE — 97530 THERAPEUTIC ACTIVITIES: CPT

## 2024-08-18 PROCEDURE — 10002803 HB RX 637: Performed by: STUDENT IN AN ORGANIZED HEALTH CARE EDUCATION/TRAINING PROGRAM

## 2024-08-18 RX ORDER — LOSARTAN POTASSIUM 50 MG/1
100 TABLET ORAL DAILY
Status: DISCONTINUED | OUTPATIENT
Start: 2024-08-18 | End: 2024-08-19

## 2024-08-18 RX ORDER — ASPIRIN 81 MG/1
81 TABLET, CHEWABLE ORAL DAILY
Status: DISCONTINUED | OUTPATIENT
Start: 2024-08-18 | End: 2024-08-19 | Stop reason: HOSPADM

## 2024-08-18 RX ORDER — POTASSIUM CHLORIDE 1500 MG/1
40 TABLET, EXTENDED RELEASE ORAL ONCE
Status: COMPLETED | OUTPATIENT
Start: 2024-08-18 | End: 2024-08-18

## 2024-08-18 RX ADMIN — SODIUM CHLORIDE, PRESERVATIVE FREE 2 ML: 5 INJECTION INTRAVENOUS at 09:25

## 2024-08-18 RX ADMIN — LOSARTAN POTASSIUM 100 MG: 50 TABLET, FILM COATED ORAL at 13:14

## 2024-08-18 RX ADMIN — Medication 3 MG: at 21:29

## 2024-08-18 RX ADMIN — METOPROLOL SUCCINATE 100 MG: 50 TABLET, EXTENDED RELEASE ORAL at 09:24

## 2024-08-18 RX ADMIN — HYDRALAZINE HYDROCHLORIDE 25 MG: 25 TABLET ORAL at 09:24

## 2024-08-18 RX ADMIN — SODIUM CHLORIDE, PRESERVATIVE FREE 2 ML: 5 INJECTION INTRAVENOUS at 21:35

## 2024-08-18 RX ADMIN — HYDROCORTISONE ACETATE 25 MG: 25 SUPPOSITORY RECTAL at 21:29

## 2024-08-18 RX ADMIN — ASPIRIN 81 MG CHEWABLE TABLET 81 MG: 81 TABLET CHEWABLE at 13:14

## 2024-08-18 RX ADMIN — POTASSIUM CHLORIDE 40 MEQ: 1500 TABLET, EXTENDED RELEASE ORAL at 09:24

## 2024-08-18 RX ADMIN — PRAVASTATIN SODIUM 80 MG: 40 TABLET ORAL at 09:24

## 2024-08-18 ASSESSMENT — COGNITIVE AND FUNCTIONAL STATUS - GENERAL
BASIC_MOBILITY_CONVERTED_SCORE: 42.48
BASIC_MOBILITY_RAW_SCORE: 19

## 2024-08-18 ASSESSMENT — PAIN SCALES - GENERAL
PAINLEVEL_OUTOF10: 0
PAINLEVEL_OUTOF10: 0

## 2024-08-19 VITALS
BODY MASS INDEX: 25.56 KG/M2 | OXYGEN SATURATION: 98 % | DIASTOLIC BLOOD PRESSURE: 68 MMHG | HEIGHT: 61 IN | HEART RATE: 67 BPM | SYSTOLIC BLOOD PRESSURE: 177 MMHG | RESPIRATION RATE: 12 BRPM | WEIGHT: 135.36 LBS | TEMPERATURE: 97.7 F

## 2024-08-19 LAB
DEPRECATED RDW RBC: 61.5 FL (ref 39–50)
ERYTHROCYTE [DISTWIDTH] IN BLOOD: 18.5 % (ref 11–15)
HCT VFR BLD CALC: 26 % (ref 36–46.5)
HGB BLD-MCNC: 8.5 G/DL (ref 12–15.5)
MCH RBC QN AUTO: 30 PG (ref 26–34)
MCHC RBC AUTO-ENTMCNC: 32.7 G/DL (ref 32–36.5)
MCV RBC AUTO: 91.9 FL (ref 78–100)
NRBC BLD MANUAL-RTO: 0 /100 WBC
PLATELET # BLD AUTO: 476 K/MCL (ref 140–450)
POTASSIUM SERPL-SCNC: 4.3 MMOL/L (ref 3.4–5.1)
RBC # BLD: 2.83 MIL/MCL (ref 4–5.2)
WBC # BLD: 11.2 K/MCL (ref 4.2–11)

## 2024-08-19 PROCEDURE — 10004651 HB RX, NO CHARGE ITEM: Performed by: INTERNAL MEDICINE

## 2024-08-19 PROCEDURE — 99239 HOSP IP/OBS DSCHRG MGMT >30: CPT | Performed by: INTERNAL MEDICINE

## 2024-08-19 PROCEDURE — 36415 COLL VENOUS BLD VENIPUNCTURE: CPT | Performed by: INTERNAL MEDICINE

## 2024-08-19 PROCEDURE — 10002803 HB RX 637: Performed by: INTERNAL MEDICINE

## 2024-08-19 PROCEDURE — 85027 COMPLETE CBC AUTOMATED: CPT | Performed by: INTERNAL MEDICINE

## 2024-08-19 PROCEDURE — 99232 SBSQ HOSP IP/OBS MODERATE 35: CPT | Performed by: INTERNAL MEDICINE

## 2024-08-19 PROCEDURE — 84132 ASSAY OF SERUM POTASSIUM: CPT | Performed by: INTERNAL MEDICINE

## 2024-08-19 RX ORDER — LOSARTAN POTASSIUM 50 MG/1
50 TABLET ORAL DAILY
Status: DISCONTINUED | OUTPATIENT
Start: 2024-08-20 | End: 2024-08-19

## 2024-08-19 RX ORDER — LOSARTAN POTASSIUM 50 MG/1
100 TABLET ORAL DAILY
Status: DISCONTINUED | OUTPATIENT
Start: 2024-08-20 | End: 2024-08-19 | Stop reason: HOSPADM

## 2024-08-19 RX ORDER — LOSARTAN POTASSIUM 50 MG/1
50 TABLET ORAL EVERY 12 HOURS SCHEDULED
Status: DISCONTINUED | OUTPATIENT
Start: 2024-08-20 | End: 2024-08-19

## 2024-08-19 RX ORDER — ASPIRIN 81 MG/1
81 TABLET, CHEWABLE ORAL DAILY
Qty: 90 TABLET | Refills: 0 | Status: SHIPPED | OUTPATIENT
Start: 2024-08-20

## 2024-08-19 RX ADMIN — PRAVASTATIN SODIUM 80 MG: 40 TABLET ORAL at 08:29

## 2024-08-19 RX ADMIN — LOSARTAN POTASSIUM 100 MG: 50 TABLET, FILM COATED ORAL at 08:29

## 2024-08-19 RX ADMIN — SODIUM CHLORIDE, PRESERVATIVE FREE 2 ML: 5 INJECTION INTRAVENOUS at 08:31

## 2024-08-19 RX ADMIN — ASPIRIN 81 MG CHEWABLE TABLET 81 MG: 81 TABLET CHEWABLE at 08:29

## 2024-08-19 RX ADMIN — METOPROLOL SUCCINATE 100 MG: 50 TABLET, EXTENDED RELEASE ORAL at 08:29

## 2024-08-19 ASSESSMENT — PAIN SCALES - GENERAL: PAINLEVEL_OUTOF10: 0

## 2024-08-20 ENCOUNTER — CASE MANAGEMENT (OUTPATIENT)
Dept: CARE COORDINATION | Age: 89
End: 2024-08-20

## 2024-08-21 ENCOUNTER — TELEPHONE (OUTPATIENT)
Dept: CARE COORDINATION | Age: 89
End: 2024-08-21

## 2024-08-22 ENCOUNTER — TELEPHONE (OUTPATIENT)
Dept: CARE COORDINATION | Age: 89
End: 2024-08-22

## 2024-08-28 ENCOUNTER — TELEPHONE (OUTPATIENT)
Dept: CARE COORDINATION | Age: 89
End: 2024-08-28

## 2024-08-28 ENCOUNTER — CASE MANAGEMENT (OUTPATIENT)
Dept: CARE COORDINATION | Age: 89
End: 2024-08-28

## 2024-08-30 ENCOUNTER — CASE MANAGEMENT (OUTPATIENT)
Dept: CARE COORDINATION | Age: 89
End: 2024-08-30

## 2024-09-04 ENCOUNTER — TELEPHONE (OUTPATIENT)
Dept: CARE COORDINATION | Age: 89
End: 2024-09-04

## 2024-09-11 ENCOUNTER — TELEPHONE (OUTPATIENT)
Dept: CARE COORDINATION | Age: 89
End: 2024-09-11

## 2024-09-18 ENCOUNTER — TELEPHONE (OUTPATIENT)
Dept: CARE COORDINATION | Age: 89
End: 2024-09-18

## 2024-09-23 PROBLEM — Z87.19 HISTORY OF LOWER GI BLEEDING: Status: ACTIVE | Noted: 2024-09-23

## 2024-11-06 ENCOUNTER — HOSPITAL ENCOUNTER (INPATIENT)
Facility: HOSPITAL | Age: 89
LOS: 4 days | Discharge: NURSING, REHAB OR LONG-TERM CARE | End: 2024-11-10
Attending: STUDENT IN AN ORGANIZED HEALTH CARE EDUCATION/TRAINING PROGRAM | Admitting: STUDENT IN AN ORGANIZED HEALTH CARE EDUCATION/TRAINING PROGRAM
Payer: MEDICARE

## 2024-11-06 ENCOUNTER — HOSPITAL ENCOUNTER (INPATIENT)
Facility: HOSPITAL | Age: 89
LOS: 4 days | Discharge: NURSING, REHAB OR LONG-TERM CARE | End: 2024-11-10
Attending: STUDENT IN AN ORGANIZED HEALTH CARE EDUCATION/TRAINING PROGRAM | Admitting: STUDENT IN AN ORGANIZED HEALTH CARE EDUCATION/TRAINING PROGRAM
Payer: MEDICARE

## 2024-11-06 ENCOUNTER — APPOINTMENT (OUTPATIENT)
Dept: GENERAL RADIOLOGY | Facility: HOSPITAL | Age: 89
End: 2024-11-06
Attending: STUDENT IN AN ORGANIZED HEALTH CARE EDUCATION/TRAINING PROGRAM
Payer: MEDICARE

## 2024-11-06 DIAGNOSIS — I50.9 ACUTE ON CHRONIC CONGESTIVE HEART FAILURE, UNSPECIFIED HEART FAILURE TYPE (HCC): Primary | ICD-10-CM

## 2024-11-06 LAB
ANION GAP SERPL CALC-SCNC: 3 MMOL/L (ref 0–18)
BUN BLD-MCNC: 19 MG/DL (ref 9–23)
BUN/CREAT SERPL: 18.6 (ref 10–20)
CALCIUM BLD-MCNC: 9 MG/DL (ref 8.7–10.4)
CHLORIDE SERPL-SCNC: 104 MMOL/L (ref 98–112)
CHOLEST SERPL-MCNC: 98 MG/DL (ref ?–200)
CO2 SERPL-SCNC: 29 MMOL/L (ref 21–32)
CREAT BLD-MCNC: 1.02 MG/DL
EGFRCR SERPLBLD CKD-EPI 2021: 50 ML/MIN/1.73M2 (ref 60–?)
GLUCOSE BLD-MCNC: 151 MG/DL (ref 70–99)
HDLC SERPL-MCNC: 31 MG/DL (ref 40–59)
LDLC SERPL CALC-MCNC: 47 MG/DL (ref ?–100)
NONHDLC SERPL-MCNC: 67 MG/DL (ref ?–130)
NT-PROBNP SERPL-MCNC: 3338 PG/ML (ref ?–450)
OSMOLALITY SERPL CALC.SUM OF ELEC: 287 MOSM/KG (ref 275–295)
POTASSIUM SERPL-SCNC: 3.8 MMOL/L (ref 3.5–5.1)
SODIUM SERPL-SCNC: 136 MMOL/L (ref 136–145)
TRIGL SERPL-MCNC: 104 MG/DL (ref 30–149)
TROPONIN I SERPL HS-MCNC: 67 NG/L
TROPONIN I SERPL HS-MCNC: 67 NG/L
VLDLC SERPL CALC-MCNC: 15 MG/DL (ref 0–30)

## 2024-11-06 PROCEDURE — 99285 EMERGENCY DEPT VISIT HI MDM: CPT

## 2024-11-06 PROCEDURE — 93005 ELECTROCARDIOGRAM TRACING: CPT

## 2024-11-06 PROCEDURE — 96374 THER/PROPH/DIAG INJ IV PUSH: CPT

## 2024-11-06 PROCEDURE — 85060 BLOOD SMEAR INTERPRETATION: CPT | Performed by: STUDENT IN AN ORGANIZED HEALTH CARE EDUCATION/TRAINING PROGRAM

## 2024-11-06 PROCEDURE — 84484 ASSAY OF TROPONIN QUANT: CPT | Performed by: STUDENT IN AN ORGANIZED HEALTH CARE EDUCATION/TRAINING PROGRAM

## 2024-11-06 PROCEDURE — 93010 ELECTROCARDIOGRAM REPORT: CPT

## 2024-11-06 PROCEDURE — 80061 LIPID PANEL: CPT | Performed by: STUDENT IN AN ORGANIZED HEALTH CARE EDUCATION/TRAINING PROGRAM

## 2024-11-06 PROCEDURE — 71045 X-RAY EXAM CHEST 1 VIEW: CPT | Performed by: STUDENT IN AN ORGANIZED HEALTH CARE EDUCATION/TRAINING PROGRAM

## 2024-11-06 PROCEDURE — 85025 COMPLETE CBC W/AUTO DIFF WBC: CPT | Performed by: STUDENT IN AN ORGANIZED HEALTH CARE EDUCATION/TRAINING PROGRAM

## 2024-11-06 PROCEDURE — 83880 ASSAY OF NATRIURETIC PEPTIDE: CPT | Performed by: STUDENT IN AN ORGANIZED HEALTH CARE EDUCATION/TRAINING PROGRAM

## 2024-11-06 PROCEDURE — 80048 BASIC METABOLIC PNL TOTAL CA: CPT | Performed by: STUDENT IN AN ORGANIZED HEALTH CARE EDUCATION/TRAINING PROGRAM

## 2024-11-06 RX ORDER — ATORVASTATIN CALCIUM 20 MG/1
20 TABLET, FILM COATED ORAL DAILY
Status: DISCONTINUED | OUTPATIENT
Start: 2024-11-07 | End: 2024-11-10

## 2024-11-06 RX ORDER — ACETAMINOPHEN 500 MG
500 TABLET ORAL EVERY 4 HOURS PRN
Status: DISCONTINUED | OUTPATIENT
Start: 2024-11-06 | End: 2024-11-10

## 2024-11-06 RX ORDER — HEPARIN SODIUM 5000 [USP'U]/ML
5000 INJECTION, SOLUTION INTRAVENOUS; SUBCUTANEOUS EVERY 8 HOURS SCHEDULED
Status: DISCONTINUED | OUTPATIENT
Start: 2024-11-06 | End: 2024-11-10

## 2024-11-06 RX ORDER — ASPIRIN 81 MG/1
324 TABLET, CHEWABLE ORAL ONCE
Status: COMPLETED | OUTPATIENT
Start: 2024-11-06 | End: 2024-11-06

## 2024-11-06 RX ORDER — ASPIRIN 81 MG/1
81 TABLET, CHEWABLE ORAL DAILY
COMMUNITY

## 2024-11-06 RX ORDER — FUROSEMIDE 10 MG/ML
40 INJECTION INTRAMUSCULAR; INTRAVENOUS ONCE
Status: COMPLETED | OUTPATIENT
Start: 2024-11-06 | End: 2024-11-06

## 2024-11-06 RX ORDER — ASPIRIN 81 MG/1
81 TABLET, CHEWABLE ORAL DAILY
Status: DISCONTINUED | OUTPATIENT
Start: 2024-11-06 | End: 2024-11-10

## 2024-11-06 RX ORDER — EZETIMIBE 10 MG/1
10 TABLET ORAL DAILY
Status: DISCONTINUED | OUTPATIENT
Start: 2024-11-07 | End: 2024-11-10

## 2024-11-06 RX ORDER — METOPROLOL SUCCINATE 100 MG/1
100 TABLET, EXTENDED RELEASE ORAL DAILY
Status: DISCONTINUED | OUTPATIENT
Start: 2024-11-07 | End: 2024-11-08

## 2024-11-07 ENCOUNTER — APPOINTMENT (OUTPATIENT)
Dept: CV DIAGNOSTICS | Facility: HOSPITAL | Age: 89
End: 2024-11-07
Attending: INTERNAL MEDICINE
Payer: MEDICARE

## 2024-11-07 PROBLEM — I21.9 ACUTE MI (HCC): Status: ACTIVE | Noted: 2024-11-07

## 2024-11-07 LAB
ALBUMIN SERPL-MCNC: 3.1 G/DL (ref 3.2–4.8)
ALBUMIN/GLOB SERPL: 1.2 {RATIO} (ref 1–2)
ALP LIVER SERPL-CCNC: 68 U/L
ALT SERPL-CCNC: 16 U/L
ANION GAP SERPL CALC-SCNC: 7 MMOL/L (ref 0–18)
AST SERPL-CCNC: 28 U/L (ref ?–34)
ATRIAL RATE: 85 BPM
BASOPHILS # BLD AUTO: 0.07 X10(3) UL (ref 0–0.2)
BASOPHILS NFR BLD AUTO: 0.4 %
BILIRUB SERPL-MCNC: 0.8 MG/DL (ref 0.2–0.9)
BUN BLD-MCNC: 16 MG/DL (ref 9–23)
BUN/CREAT SERPL: 18.4 (ref 10–20)
CALCIUM BLD-MCNC: 8.4 MG/DL (ref 8.7–10.4)
CHLORIDE SERPL-SCNC: 102 MMOL/L (ref 98–112)
CO2 SERPL-SCNC: 29 MMOL/L (ref 21–32)
CREAT BLD-MCNC: 0.87 MG/DL
DEPRECATED RDW RBC AUTO: 46.8 FL (ref 35.1–46.3)
DEPRECATED RDW RBC AUTO: 48.7 FL (ref 35.1–46.3)
EGFRCR SERPLBLD CKD-EPI 2021: 61 ML/MIN/1.73M2 (ref 60–?)
EOSINOPHIL # BLD AUTO: 0.13 X10(3) UL (ref 0–0.7)
EOSINOPHIL NFR BLD AUTO: 0.8 %
ERYTHROCYTE [DISTWIDTH] IN BLOOD BY AUTOMATED COUNT: 15.4 % (ref 11–15)
ERYTHROCYTE [DISTWIDTH] IN BLOOD BY AUTOMATED COUNT: 15.6 % (ref 11–15)
GLOBULIN PLAS-MCNC: 2.6 G/DL (ref 2–3.5)
GLUCOSE BLD-MCNC: 107 MG/DL (ref 70–99)
HCT VFR BLD AUTO: 31.8 %
HCT VFR BLD AUTO: 37.1 %
HGB BLD-MCNC: 10.6 G/DL
HGB BLD-MCNC: 11.7 G/DL
IMM GRANULOCYTES # BLD AUTO: 0.11 X10(3) UL (ref 0–1)
IMM GRANULOCYTES NFR BLD: 0.7 %
LYMPHOCYTES # BLD AUTO: 0.95 X10(3) UL (ref 1–4)
LYMPHOCYTES NFR BLD AUTO: 5.8 %
MAGNESIUM SERPL-MCNC: 1.9 MG/DL (ref 1.6–2.6)
MCH RBC QN AUTO: 26.7 PG (ref 26–34)
MCH RBC QN AUTO: 27.7 PG (ref 26–34)
MCHC RBC AUTO-ENTMCNC: 31.5 G/DL (ref 31–37)
MCHC RBC AUTO-ENTMCNC: 33.3 G/DL (ref 31–37)
MCV RBC AUTO: 83.2 FL
MCV RBC AUTO: 84.7 FL
MONOCYTES # BLD AUTO: 2.01 X10(3) UL (ref 0.1–1)
MONOCYTES NFR BLD AUTO: 12.3 %
NEUTROPHILS # BLD AUTO: 13.09 X10 (3) UL (ref 1.5–7.7)
NEUTROPHILS # BLD AUTO: 13.09 X10(3) UL (ref 1.5–7.7)
NEUTROPHILS NFR BLD AUTO: 80 %
OSMOLALITY SERPL CALC.SUM OF ELEC: 288 MOSM/KG (ref 275–295)
P AXIS: 3 DEGREES
P-R INTERVAL: 192 MS
PLATELET # BLD AUTO: 431 10(3)UL (ref 150–450)
PLATELET # BLD AUTO: 519 10(3)UL (ref 150–450)
POTASSIUM SERPL-SCNC: 2.9 MMOL/L (ref 3.5–5.1)
POTASSIUM SERPL-SCNC: 3.9 MMOL/L (ref 3.5–5.1)
PROT SERPL-MCNC: 5.7 G/DL (ref 5.7–8.2)
Q-T INTERVAL: 388 MS
QRS DURATION: 82 MS
QTC CALCULATION (BEZET): 461 MS
R AXIS: -25 DEGREES
RBC # BLD AUTO: 3.82 X10(6)UL
RBC # BLD AUTO: 4.38 X10(6)UL
SODIUM SERPL-SCNC: 138 MMOL/L (ref 136–145)
T AXIS: 4 DEGREES
TROPONIN I SERPL HS-MCNC: 78 NG/L
VENTRICULAR RATE: 85 BPM
WBC # BLD AUTO: 12.6 X10(3) UL (ref 4–11)
WBC # BLD AUTO: 16.4 X10(3) UL (ref 4–11)

## 2024-11-07 PROCEDURE — 84484 ASSAY OF TROPONIN QUANT: CPT | Performed by: STUDENT IN AN ORGANIZED HEALTH CARE EDUCATION/TRAINING PROGRAM

## 2024-11-07 PROCEDURE — 85027 COMPLETE CBC AUTOMATED: CPT | Performed by: STUDENT IN AN ORGANIZED HEALTH CARE EDUCATION/TRAINING PROGRAM

## 2024-11-07 PROCEDURE — 93306 TTE W/DOPPLER COMPLETE: CPT | Performed by: INTERNAL MEDICINE

## 2024-11-07 PROCEDURE — 80053 COMPREHEN METABOLIC PANEL: CPT | Performed by: STUDENT IN AN ORGANIZED HEALTH CARE EDUCATION/TRAINING PROGRAM

## 2024-11-07 PROCEDURE — 83735 ASSAY OF MAGNESIUM: CPT | Performed by: STUDENT IN AN ORGANIZED HEALTH CARE EDUCATION/TRAINING PROGRAM

## 2024-11-07 PROCEDURE — 84132 ASSAY OF SERUM POTASSIUM: CPT | Performed by: STUDENT IN AN ORGANIZED HEALTH CARE EDUCATION/TRAINING PROGRAM

## 2024-11-07 RX ORDER — ONDANSETRON 2 MG/ML
4 INJECTION INTRAMUSCULAR; INTRAVENOUS EVERY 6 HOURS PRN
Status: DISCONTINUED | OUTPATIENT
Start: 2024-11-07 | End: 2024-11-10

## 2024-11-07 RX ORDER — HYDRALAZINE HYDROCHLORIDE 25 MG/1
25 TABLET, FILM COATED ORAL EVERY 8 HOURS SCHEDULED
Status: DISCONTINUED | OUTPATIENT
Start: 2024-11-07 | End: 2024-11-07

## 2024-11-07 RX ORDER — LOSARTAN POTASSIUM 50 MG/1
50 TABLET ORAL ONCE
Status: COMPLETED | OUTPATIENT
Start: 2024-11-07 | End: 2024-11-07

## 2024-11-07 RX ORDER — HYDRALAZINE HYDROCHLORIDE 20 MG/ML
10 INJECTION INTRAMUSCULAR; INTRAVENOUS EVERY 4 HOURS PRN
Status: DISCONTINUED | OUTPATIENT
Start: 2024-11-07 | End: 2024-11-10

## 2024-11-07 RX ORDER — POTASSIUM CHLORIDE 1500 MG/1
40 TABLET, EXTENDED RELEASE ORAL EVERY 4 HOURS
Status: COMPLETED | OUTPATIENT
Start: 2024-11-07 | End: 2024-11-07

## 2024-11-07 RX ORDER — LOSARTAN POTASSIUM 50 MG/1
50 TABLET ORAL DAILY
Status: DISCONTINUED | OUTPATIENT
Start: 2024-11-07 | End: 2024-11-08

## 2024-11-07 RX ORDER — HYDRALAZINE HYDROCHLORIDE 25 MG/1
25 TABLET, FILM COATED ORAL EVERY 6 HOURS PRN
Status: DISCONTINUED | OUTPATIENT
Start: 2024-11-07 | End: 2024-11-07

## 2024-11-07 RX ORDER — LABETALOL HYDROCHLORIDE 5 MG/ML
10 INJECTION, SOLUTION INTRAVENOUS EVERY 6 HOURS PRN
Status: DISCONTINUED | OUTPATIENT
Start: 2024-11-07 | End: 2024-11-10

## 2024-11-07 NOTE — H&P
[facility]  part of [facility]     [facility] H&P     CC:   Chief Complaint   Patient presents with    Difficulty Breathing        PCP: ADONIS QUIROZ MD, MD      Assessment and Plan     Marilu Dickens is a 89 year old female with PMH sig for CAD post CABG in 1991, hyperlipidemia, hypertension, renal artery stenosis and recent admission to outside hospital with GI bleed with discharge hemoglobin of 8.5 who presented from her apartment at [place] with shortness of breath over the last 3 weeks.  Blood pressure is chronically elevated.  Troponin on admission was 67 with repeat relatively flat in the 70s.  Chest x-ray with bilateral pulmonary congestion.  proBNP 3338.      Acute on chronic congestive heart failure  - Follow-up echo, continue IV diuresis, monitor clinical response, renal function and lites  -Daily weights and I's and O's    CAD status post CABG in 1991, hypertension poorly controlled with known renal artery stenosis, hyperlipidemia  - Patient states is hard time controlling blood pressure despite medications, given age allowed to drift higher  -Continue losartan, metoprolol  - As needed hydralazine ordered    Hypokalemia   - Per protocol    FEN: No IV fluids, lites in a.m., cardiac diet    PPx: Heparin subcu    Dispo: Discussed CODE STATUS, unsure if she wants to make any limitations will discuss with family, full code for now    Deconditioning: Encourage patient to walk with staff, at baseline walks without walker, will get PT evaluation    Outpatient records reviewed confirming patient's medical history and medications.     Further recommendations pending patient's clinical course.  [facility] hospitalist to continue to follow patient while in house    Patient and/or patient's family given opportunity to ask questions and note understanding and agreeing with therapeutic plan as outlined    Note: This chart was prepared using voice recognition software and may contain unintended word  substitution errors.       Message left for DTR on 11/7/24    Thank You,  Abigail Avina MD  [facility] Hospitalist     Answering Service number: [phone]    HPI     Marilu Dickens is a 89 year old female with PMH sig for CAD post CABG in 1991, hyperlipidemia, hypertension, renal artery stenosis and recent admission to outside hospital with GI bleed with discharge hemoglobin of 8.5 who presented from her apartment at [place] with shortness of breath over the last 3 weeks.  Blood pressure is chronically elevated.  Troponin on admission was 67 with repeat relatively flat in the 70s.  Chest x-ray with bilateral pulmonary congestion.  proBNP 3338.      Patient denies any chest pain.  No hypoxia.  Pretty independent at baseline.  Hemoglobin is 10.6 here.  Discharge hemoglobin during prior admission was 8.5 at outside hospital.  Cardiology and pulmonology on consult.    Review of Systems  12 point systems reviewed, please see HPI for pertinent positives, otherwise negative    History     PMH  Past Medical History:    Atherosclerosis of coronary artery    CAD (coronary artery disease)    Carotid artery disease (HCC)    Dyslipidemia    Renal artery stenosis (HCC)        PSH  Past Surgical History:   Procedure Laterality Date    Appendectomy      Breast surgery Right     Cabg  1991        ALL:  Allergies[1]     Home Medications:  Medications Taking[2]    Soc Hx  Social History     Tobacco Use    Smoking status: Never    Smokeless tobacco: Never   Substance Use Topics    Alcohol use: Never        Fam Hx  No family history on file.  nc      Objective     Temp: 99 °F (37.2 °C)  Pulse: 76  Resp: 16  BP: 172/64    Exam  Gen: No acute distress, alert and oriented x3  Neck Supple, no JVD  Pulm: Lungs clear, normal respiratory effort, No wheezing or crackles  CV: Heart with regular rate and rhythm, No murmurs, rubs, gallops  Abd: Abdomen soft, nontender, nondistended, no organomegaly, bowel sounds present  MSK:   no clubbing, no  cyanosis.  B/l LE edema   Skin: no rashes or lesions, well perfused  Psych: mood stable, cooperative  Neuro: No focal deficits    Diagnostic Data:    CBC/Chem  Recent Labs   Lab 11/06/24 1911 11/07/24  0609   WBC 16.4* 12.6*   HGB 11.7* 10.6*   MCV 84.7 83.2   .0* 431.0       Recent Labs   Lab 11/06/24 1911 11/07/24  0609    138   K 3.8 2.9*    102   CO2 29.0 29.0   BUN 19 16   CREATSERUM 1.02 0.87   * 107*   CA 9.0 8.4*   MG  --  1.9       Recent Labs   Lab 11/07/24  0609   ALT 16   AST 28   ALB 3.1*       No results for input(s): \"TROP\" in the last 168 hours.     EKG: no acute ST changes, NSR    Radiology: XR CHEST AP PORTABLE  (CPT=71045)    Result Date: 11/6/2024  PROCEDURE: XR CHEST AP PORTABLE  (CPT=71045) TIME: 1927 hours  COMPARISON: [facility], XR RIBS WITH CHEST (3 VIEWS), RIGHT (CPT=71101), 8/19/2023, 7:59 PM.  INDICATIONS: Difficulty in breathing today.  TECHNIQUE:   Single view.   FINDINGS:  CARDIAC/VASC: Post coronary artery bypass. Cardiomegaly. Pulmonary venous congestion. Atherosclerotic calcification aorta. MEDIAST/REMBERTO:   No visible mass or adenopathy. LUNGS/PLEURA: No consolidation. BONES: Post sternotomy. OTHER: Negative.          CONCLUSION:  1. Mild CHF.    Dictated by (CST): Gerardo Cleaning MD on 11/06/2024 at 7:40 PM     Finalized by (CST): Gerardo Cleaning MD on 11/06/2024 at 7:42 PM                      [1]   Allergies  Allergen Reactions    Sulfa Antibiotics    [2]   Outpatient Medications Marked as Taking for the 11/6/24 encounter (Hospital Encounter)   Medication Sig Dispense Refill    aspirin 81 MG Oral Chew Tab Chew 1 tablet (81 mg total) by mouth daily.      EZETIMIBE 10 MG Oral Tab TAKE 1 TABLET(10 MG) BY MOUTH EVERY DAY 90 tablet 2    Irbesartan 300 MG Oral Tab Take 1 tablet (300 mg total) by mouth daily. 90 tablet 3    PRAVASTATIN SODIUM 80 MG Oral Tab TAKE 1 TABLET BY MOUTH EVERY EVENING 90 tablet 3    METOPROLOL SUCCINATE 100 MG Oral  Tablet 24 Hr TAKE 1 TABLET(100 MG) BY MOUTH EVERY DAY 90 tablet 3

## 2024-11-07 NOTE — CONSULTS
Cardiology Consultation  [facility]    Marilu Dickens Patient Status:  Inpatient    1927 MRN [id number]   Location [facility] 3W/SW Attending Abigail Avina MD   Hosp Day # 1 PCP ADONIS QUIROZ MD, MD     Reason for Consultation:  SOB    History of Present Illness:  Marilu Dickens is a a(n) 89 year old female with 89 yo with CAD (s/p CABG), HLD, renal artery stenosis (documented from ), carotid artery disease, GIB here for shortness of breath. Had had 3 weeks of progressive SOB. CXR showing some mild congestion. Pro-BNP elevated to 3338. Trop mildly elevated to 67 -> 78.      She saw me in clinic on 10/14/24 for hypertensive crisis with SBP at 200/86 mmHg and she had complained of word finding difficulty. I asked her to come to the ED at that time, and she had refused.  Here, she received one dose of IV lasix 40mg and states that she feels better.      Assessment/Plan:    Shortness of breath 2/2 acute HF or unknown etiology (no recent echo)  - Likely 2/2 hypertensive crisis  Hypertension, elevated  Elevated troponin, not consistent with ACS. Likely Type II MI.  Hypokalemia    - IV diuresis  - Strict I/O, BMP, daily weight  - tele  - echo  - home metoprolol succinate 100mg  - adding home ARB. Also has hydralazine 25mg but will see how her BP does.  - Continue ASA + statin + zetia    History:  Past Medical History:    Atherosclerosis of coronary artery    CAD (coronary artery disease)    Carotid artery disease (HCC)    Dyslipidemia    Renal artery stenosis (HCC)     Past Surgical History:   Procedure Laterality Date    Appendectomy      Breast surgery Right     Cabg       No family history on file.   reports that she has never smoked. She has never used smokeless tobacco. She reports that she does not drink alcohol and does not use drugs.    Allergies:  Allergies[1]    Medications:  Medications Ordered Prior to Encounter[2]    Review of Systems:  As above, otherwise  negative.      Physical Exam:  Blood pressure (!) 163/62, pulse 74, temperature 98.4 °F (36.9 °C), temperature source Oral, resp. rate 16, height 5' 1\" (1.549 m), weight 134 lb (60.8 kg), SpO2 94%.  Wt Readings from Last 3 Encounters:   11/07/24 134 lb (60.8 kg)   08/19/23 138 lb 0.1 oz (62.6 kg)   08/09/22 138 lb (62.6 kg)       General: awake, alert, oriented x 3, no acute distress  HEENT: at/nc, perrl, eomi  Neck: No JVD, carotids 2+ no bruits.  Cardiac: Regular rate and rhythm, S1, S2 normal, no murmur, rub or gallop.  Lungs: Clear without wheezes, rales, rhonchi or dullness.  Normal excursions and effort.  Abdomen: Soft, non-tender, non-distended, normal bowel sounds   Extremities: Without clubbing, cyanosis or edema.  Peripheral pulses are 2+.  Neurologic: Alert and oriented, normal affect.  Psych: normal mood and affect  Skin: Warm and dry.       Laboratories and Data:  Diagnostics:      Labs:   CBC:    Lab Results   Component Value Date    WBC 12.6 (H) 11/07/2024    WBC 16.4 (H) 11/06/2024    WBC 13.7 (H) 09/12/2017     Lab Results   Component Value Date    HEMOGLOBIN 12.8 03/19/2014    HGB 10.6 (L) 11/07/2024    HGB 11.7 (L) 11/06/2024    HGB 12.5 09/12/2017      Lab Results   Component Value Date    .0 11/07/2024    .0 (H) 11/06/2024    .0 09/12/2017     BMP:     Lab Results   Component Value Date    GLUCOSE 121 (H) 02/26/2015     Lab Results   Component Value Date    K 2.9 (LL) 11/07/2024    K 3.8 11/06/2024    K 4.94 02/01/2022     Lab Results   Component Value Date    BUN 16 11/07/2024    BUN 19 11/06/2024    BUN 14.0 02/01/2022     Lab Results   Component Value Date    CREATSERUM 0.87 11/07/2024    CREATSERUM 1.02 11/06/2024    CREATSERUM 0.68 02/01/2022     Cholesterol:     Lab Results   Component Value Date    CHOLEST 98 11/06/2024    CHOLEST 117.00 02/01/2022    CHOLEST 113.00 09/01/2020     Lab Results   Component Value Date    HDL 31 (L) 11/06/2024    HDL 65 02/01/2022    HDL  61 09/01/2020     Lab Results   Component Value Date    TRIG 104 11/06/2024    TRIG 92.00 02/01/2022    TRIG 99.00 09/01/2020    TRIGLY 119 02/26/2015     Lab Results   Component Value Date    LDL 47 11/06/2024    LDL 34 02/01/2022    LDL 32 09/01/2020     Lab Results   Component Value Date    AST 28 11/07/2024    AST 19 02/01/2022    AST 25 01/05/2021     Lab Results   Component Value Date    ALT 16 11/07/2024    ALT 16 02/01/2022    ALT 12 01/05/2021           Dg Smith MD  Interventional Cardiology  [place]  11/7/2024  7:34 AM         [1]   Allergies  Allergen Reactions    Sulfa Antibiotics    [2]   No current facility-administered medications on file prior to encounter.     Current Outpatient Medications on File Prior to Encounter   Medication Sig Dispense Refill    aspirin 81 MG Oral Chew Tab Chew 1 tablet (81 mg total) by mouth daily.      EZETIMIBE 10 MG Oral Tab TAKE 1 TABLET(10 MG) BY MOUTH EVERY DAY 90 tablet 2    Irbesartan 300 MG Oral Tab Take 1 tablet (300 mg total) by mouth daily. 90 tablet 3    PRAVASTATIN SODIUM 80 MG Oral Tab TAKE 1 TABLET BY MOUTH EVERY EVENING 90 tablet 3    METOPROLOL SUCCINATE 100 MG Oral Tablet 24 Hr TAKE 1 TABLET(100 MG) BY MOUTH EVERY DAY 90 tablet 3

## 2024-11-07 NOTE — ED PROVIDER NOTES
Patient Seen in: [facility] Emergency Department      History     Chief Complaint   Patient presents with    Difficulty Breathing     Stated Complaint: TABATHA    Subjective:   HPI      89-year-old female with history of hypertension, hyperlipidemia, seizure disorder on Keppra, previous GI bleed, CAD status post CABG presenting for evaluation of shortness of breath.  3-week history of progressive shortness of breath, mostly nocturnal cough.  Has trace lower extremity swelling.  No weight gain.  Denies orthopnea or PND.  No fevers but does endorse chills.  Cough is mostly nonproductive.    Objective:     No pertinent past medical history.            No pertinent past surgical history.              No pertinent social history.                Physical Exam     ED Triage Vitals [11/06/24 1845]   BP (!) 215/85   Pulse 87   Resp 20   Temp 98.7 °F (37.1 °C)   Temp src Temporal   SpO2 97 %   O2 Device None (Room air)       Current Vitals:   Vital Signs  BP: (!) 187/73  Pulse: 78  Resp: 18  Temp: 98.7 °F (37.1 °C)  Temp src: Temporal  MAP (mmHg): (!) 107    Oxygen Therapy  SpO2: 97 %  O2 Device: None (Room air)        Physical Exam    Constitutional: awake, alert, no sig distress  HENT: mmm, no lesions,  Neck: normal range of motion, no tenderness, supple.  Eyes: PERRL, EOMI, conjunctiva normal, no discharge. Sclera anicteric.  Cardiovascular: rr no murmur  Respiratory: bibasilar rales  GI: Bowel sounds normal, Soft, no tenderness, no masses, no pulsatile masses.  : No CVA tenderness.  Skin: Warm, dry, no erythema, no rash.  Musculoskeletal: Intact distal pulses, trace leg edema b/l, no tenderness, no cyanosis, no clubbing. Good range of motion in all major joints. No tenderness to palpation or major deformities noted. Back- No tenderness.  Neurologic: Alert & oriented x 3, normal motor function, normal sensory function, no focal deficits noted.  Psych: Calm, cooperative, nl affect        ED Course     Labs Reviewed    BASIC METABOLIC PANEL (8) - Abnormal; Notable for the following components:       Result Value    Glucose 151 (*)     eGFR-Cr 50 (*)     All other components within normal limits   CBC WITH DIFFERENTIAL WITH PLATELET - Abnormal; Notable for the following components:    WBC 16.4 (*)     HGB 11.7 (*)     RDW-SD 48.7 (*)     RDW 15.6 (*)     .0 (*)     Neutrophil Absolute Prelim 13.09 (*)     All other components within normal limits   TROPONIN I HIGH SENSITIVITY - Abnormal; Notable for the following components:    Troponin I (High Sensitivity) 67 (*)     All other components within normal limits   PRO BETA NATRIURETIC PEPTIDE - Abnormal; Notable for the following components:    Pro-Beta Natriuretic Peptide 3,338 (*)     All other components within normal limits   LIPID PANEL - Abnormal; Notable for the following components:    HDL Cholesterol 31 (*)     All other components within normal limits   TROPONIN I HIGH SENSITIVITY - Abnormal; Notable for the following components:    Troponin I (High Sensitivity) 67 (*)     All other components within normal limits   SCAN SLIDE   MD BLOOD SMEAR CONSULT   COMP METABOLIC PANEL (14)   CBC, PLATELET; NO DIFFERENTIAL   MAGNESIUM   TROPONIN I HIGH SENSITIVITY              ED Course as of 11/06/24 2317  ------------------------------------------------------------  Time: 11/06 1854  Comment: EKG as interpreted by ED Physician: NSR 85 BPM, left axis deviation, old inferior, anterior infarcts, no acute st segment change, no stemi. Qtc 461ms  ------------------------------------------------------------  Time: 11/06 1945  Comment: Independently reviewed patient's chest x-ray which is remarkable for cardiomegaly and interstitial opacities that I think compatible with pulmonary edema  ------------------------------------------------------------  Time: 11/06 2000  Comment: Labs with elevated trop, bnp - suspect type II MI 2/2 decompensated heart failure              MDM       89-year-old female presenting with nocturnal cough and dyspnea progressive for 1 month.  On arrival markedly hypertensive otherwise vitals are stable and reassuring.  Presentation seems most compatible with CHF  DDx includes ACS bronchopneumonia pneumothorax lung mass metabolic derangement symptomatic anemia  No unilateral calf pain swelling no hemoptysis no recent prolonged immobilization, do not suspect PE on the basis of history and examination  Admission disposition: 11/6/2024  8:12 PM           Labs return with trop, bnp elevation - suspect type II MI d/t uncontrolled BP likely diastolic CHF exacerbation. Given lasix. Will admit. D/w Hospitalist and cardiology. Given ASA.    I spent a total of 39 minutes of critical care time in obtaining history, performing a physical exam, bedside monitoring of interventions, collecting and interpreting tests and discussion with consultants but not including time spent performing procedures.      Medical Decision Making      Disposition and Plan     Clinical Impression:  1. Acute on chronic congestive heart failure, unspecified heart failure type (HCC)         Disposition:  Admit  11/6/2024  8:12 pm    Follow-up:  No follow-up provider specified.  We recommend that you schedule follow up care with a primary care provider within the next three months to obtain basic health screening including reassessment of your blood pressure.      Medications Prescribed:  Current Discharge Medication List              Supplementary Documentation:         Hospital Problems       Present on Admission  Date Reviewed: 8/9/2022            ICD-10-CM Noted POA    * (Principal) Acute on chronic congestive heart failure, unspecified heart failure type (HCC) I50.9 11/6/2024 Unknown    Acute exacerbation of CHF (congestive heart failure) (HCC) I50.9 11/6/2024 Unknown

## 2024-11-07 NOTE — DISCHARGE INSTRUCTIONS
If any lightheadedness or dizziness please check blood pressure.  If any blood pressure under 110 hold amlodipine (norvasc) 5mg and notify physician       Going Home Instructions  In this section you will find the tools which will guide you through the first few days after you leave the hospital. Continued use of these tools will help you develop the skills necessary to keep your heart failure under control.     Home Care Instructions Following Heart Failure - the most important things to do every day include:   Weigh yourself and review the “Self-Check Plan” sheet every morning.   Call your cardiologist office if you are in the “Pay Attention-Use Caution” (yellow zone) or “Medical Alert-Warning!” (red zone) as outlined in the Self-Check Plan sheet.  Take your medicines as prescribed.  Limit your sodium (salt) intake.  Know when to call your cardiologist, primary doctor, or nurse.  Know when to seek emergency care.      Things for You to Remember:   1. See your doctor or healthcare provider as written on your discharge instructions.  It is important that you attend this appointment to make sure your symptoms are under control.     2. Your recommended sodium intake is 2407-0032 mg daily.    3.  Weigh yourself every day.    4. Some exercise and activity is important to help keep your heart functioning and strong. Unless instructed not to exercise, you may walk at a slow to moderate pace for 10-15 minutes 2-3 days per week to start. Pace your activity to prevent shortness of breath or fatigue. Stop exercising if you develop chest pain, lightheadedness, or significant shortness of breath.       Call Your Cardiologist If:   You gain 2-3 pounds in one day or 5 pounds in one week.  You have more difficulty breathing.  You are getting more tired with normal activity.  You are more short of breath lying down, or awaken at night short of breath.  You have swelling of your feet or legs.  You urinate less often during the day  and more often at night.  You have cramps in your legs.  You have blurred vision or see yellowish-green halos around objects of lights.    Go to the Emergency Room If:   You have pain or tightness in your chest  You are extremely short of breath  You are coughing up pink-frothy mucus  You are traveling and develop symptoms of worsening heart failure      ** Please follow up with your cardiologist or Advanced Practice Provider as written on your discharge instructions. If you are not provided with an appointment, let your nurse know so you can get an appointment**

## 2024-11-07 NOTE — ED INITIAL ASSESSMENT (HPI)
Pt arrives through triage with granddaughter      complaints of TABATHA, chills/ being cold that has been going  on for about a month. +cough unsure of fevers. At home covid on sat and it was negative.

## 2024-11-07 NOTE — ED QUICK NOTES
Received report from primary RN. Rounding Completed    Plan of Care reviewed. Waiting for bed upstairs to be cleaned.  Elimination needs assessed.  Provided Asprin and lasix.    Bed is locked and in lowest position. Call light within reach.

## 2024-11-07 NOTE — PROGRESS NOTES
Heart Failure Nurse  Progress Note    Patient was evaluated by the Heart Failure Nurse  for understanding, verbalization, demonstration, and recall of education related to heart failure, overall adherence to the behaviors necessary to maintain a compensated status, and risk for readmission.     Patient assessment:  A very awake and alert little lady. She was up to chair during discussion. Has watched her salt intake her whole life and never used a salt shaker.  Patient is able to verbalize signs/symptoms fluid overload/impending HF exacerbation and who to contact with problems                                          _x_ yes  ___ no      Patient is following a 2000 mg sodium diet                                             __x_ yes  ___ no does the best she can at [facility] as they supply the food. Eats a lot of fish that is prepared there.    If no, barriers to 2000 mg sodium diet:_______________________________________________    Patient informed of 2-Part dietician classes that is free if sign up within 30 days of discharge or $40  ___ yes  _x__ no      Patient is adherent to medication regimen                                              _x__ yes  ___ no    If no, barriers to medication regimen:    Patient has sufficient funds to purchase medication                      __x_ yes  ___ no      Patient has a scale in the home              ___ yes  _x__ no lives at facility but does know they have a scale in house.      Patient is adherent to daily weight monitoring                                        ___ yes   __x_ no Maybe difficult doing this as she is not sure where the scale is.    If no, barriers to daily weight monitoring:    Symptom Tracker Worksheet reviewed with patient  _x__ yes   ___ no      Patient verbalizes understanding of stoplight/heart failure zones          __x_ yes   ___ no      Patient understands the importance of 7-day follow-up appointment      __x_ yes   ___ no    Appointment Date:          Dg Smith MD  Interventional, Cardiology [phone] [place] [address] [facility] [address]      Next Steps: Go on 11/18/2024  Instructions: @1100       Patient has adequate transportation to attend follow-up appointments    __x_ yes  ___ noFacility does supply a van and she has friends that can take her.    If no, was referral to Social Work made  ___yes  ___ no      Family/Friend present during education: none    Additional consultations required: none    Pee Vital RN HF  XT [id number]

## 2024-11-07 NOTE — PLAN OF CARE
Problem: Patient Centered Care  Goal: Patient preferences are identified and integrated in the patient's plan of care  Description: Interventions:  - What would you like us to know as we care for you?   - Provide timely, complete, and accurate information to patient/family  - Incorporate patient and family knowledge, values, beliefs, and cultural backgrounds into the planning and delivery of care  - Encourage patient/family to participate in care and decision-making at the level they choose  - Honor patient and family perspectives and choices  Outcome: Progressing     Problem: Patient/Family Goals  Goal: Patient/Family Long Term Goal  Description: Patient's Long Term Goal:     Interventions:  - See additional Care Plan goals for specific interventions  Outcome: Progressing  Goal: Patient/Family Short Term Goal  Description: Patient's Short Term Goal:     Interventions:   - See additional Care Plan goals for specific interventions  Outcome: Progressing     Problem: CARDIOVASCULAR - ADULT  Goal: Maintains optimal cardiac output and hemodynamic stability  Description: INTERVENTIONS:  - Monitor vital signs, rhythm, and trends  - Monitor for bleeding, hypotension and signs of decreased cardiac output  - Evaluate effectiveness of vasoactive medications to optimize hemodynamic stability  - Monitor arterial and/or venous puncture sites for bleeding and/or hematoma  - Assess quality of pulses, skin color and temperature  - Assess for signs of decreased coronary artery perfusion - ex. Angina  - Evaluate fluid balance, assess for edema, trend weights  Outcome: Progressing  Goal: Absence of cardiac arrhythmias or at baseline  Description: INTERVENTIONS:  - Continuous cardiac monitoring, monitor vital signs, obtain 12 lead EKG if indicated  - Evaluate effectiveness of antiarrhythmic and heart rate control medications as ordered  - Initiate emergency measures for life threatening arrhythmias  - Monitor electrolytes and administer  replacement therapy as ordered  Outcome: Progressing

## 2024-11-07 NOTE — CONSULTS
Spoke with [facility]. They will see patient for FU therapy if they have had an initial evaluation.     Called mom, patient has already been evaluated by [facility] in the [facility]. I informed mom I will call to set up therapy with Petra Joiner.     Rupert Smith, Pediatric      [facility]    PATIENT'S NAME: LORENA VIZCARRA   ATTENDING PHYSICIAN: Abigail Avina MD   CONSULTING PHYSICIAN: Rona Baldwin MD   PATIENT ACCOUNT#:   [id number]    LOCATION:  [address] [place] [address]  MEDICAL RECORD #:   [id number]       YOB: 1927  ADMISSION DATE:       11/06/2024      CONSULT DATE:  11/07/2024    REPORT OF CONSULTATION      HISTORY OF PRESENT ILLNESS:  The patient is a 89-year-old white female with history of coronary artery bypass graft in 1991, right carotid endarterectomy for right carotid artery stenosis and renal artery stenosis, hypertension.  Has experienced progressive shortness of breath for 3 weeks.  The patient was seen by cardiologist 3 weeks ago.  Blood pressure was highly elevated.  The patient has shortness of breath off and on, and patient also noted lower leg edema.  Yesterday patient's symptoms got worse.  The patient was brought to emergency room by granddaughter.  Chest x-ray showed bilateral pulmonary congestion.  Her proBNP was 3338.  Troponin I high sensitivity was elevated at 67.  Electrolytes showed sodium 136, potassium 3.8, chloride 104, CO2 of 29, BUN 19, creatinine 1.0.  CBC showed WBC 12,600, RBC 3.83, hemoglobin 10.6, hematocrit 31.  Electrolytes showed sodium 138, potassium 2.9, chloride 102, CO2 of 29.    MEDICATIONS:  Home medications:  Aspirin 81 mg a day, Zetia 10 mg a day, Pravachol 80 mg a day, Toprol 100 mg a day.    ALLERGIES:  No known allergies.    SOCIAL HISTORY:  No history of cigarette smoking or alcohol abuse.    REVIEW OF SYSTEMS:  GENERAL:  Weak, no headache.  EYES:  No diplopia, blurred vision.  EARS:  No tinnitus or impaired hearing.  NOSE:  No rhinorrhea or epistaxis.  THROAT:  No sore throat.  NECK:  No neck stiffness.  RESPIRATORY:  See the present illness.  The patient denied chest pain.  CARDIOVASCULAR:  No orthopnea.  No chest pain.  GASTROINTESTINAL:  No nausea, vomiting, diarrhea.  GENITOURINARY:  No dysuria or hematuria.       PHYSICAL EXAMINATION:    VITAL SIGNS:  Temperature 99, pulse 76, respirations 16, blood pressure 172/64.  HEENT:  Examination of head and face:  No trauma, no injury.  Pupils equal bilaterally.  Conjunctivae were not anemic.  Sclerae nonicteric.  Fundi were normal.  External ears:  No deformity.  Ear canals were patent.  Eardrums were intact.  Nose:  No congestion, polyp.  Mouth, throat:  Free.    NECK:  No neck stiffness.  No palpable nodes.  No carotid bruits.  CHEST:  Symmetric.  LUNGS:  Rales.  HEART:  Regular.  No murmur.  Cardiac dullness was normal.  ABDOMEN:  Soft.  No hepatosplenomegaly.  No ascites.  No hernia.  EXTREMITIES:  Edema 2+.    DIAGNOSTIC IMPRESSION:    1.   Acute on chronic congestive heart failure.  2.   Acute myocardial infarction type 2.  3.   History of coronary artery bypass graft in 1991.   4.   History of right carotid endarterectomy.  5.   History of renal artery stenosis.  6.   Hypertension.    SUGGESTIONS AND RECOMMENDATIONS:  Continue diuretic.  Cardiac workup by a cardiologist.  Continue losartan 50 mg a day, Toprol 100 mg a day.    Dictated By Rona Baldwin MD  d: 11/07/2024 10:20:40  t: 11/07/2024 11:49:25  Job [id number]  T/

## 2024-11-07 NOTE — PLAN OF CARE
Monitoring vital signs, stable at this time. No acute changes at this moment. Fall precautions in place-- bed alarm on, bed locked in lowest position, call light within reach. Frequent rounding by nursing staff.      Problem: Patient Centered Care  Goal: Patient preferences are identified and integrated in the patient's plan of care  Description: Interventions:  - What would you like us to know as we care for you? [facility]  - Provide timely, complete, and accurate information to patient/family  - Incorporate patient and family knowledge, values, beliefs, and cultural backgrounds into the planning and delivery of care  - Encourage patient/family to participate in care and decision-making at the level they choose  - Honor patient and family perspectives and choices  Outcome: Progressing     Problem: Patient/Family Goals  Goal: Patient/Family Long Term Goal  Description: Patient's Long Term Goal: To get discharged    Interventions:  - Monitor vital signs   - Monitor appropriate labs   - Pain management   - Administer medications per order   - Follow MD orders   - Diagnostics per order   - Update/inform patient and family on plan of care   - Discharge planning   - See additional Care Plan goals for specific interventions  Outcome: Progressing  Goal: Patient/Family Short Term Goal  Description: Patient's Short Term Goal: To get better    Interventions:   - Monitor vital signs   - Monitor appropriate labs  - Pain management   - Administer medications per order   - Follow MD orders   - Diagnostics per order   - Update/inform patient and family on plan of care   - Discharge planning   - See additional Care Plan goals for specific interventions  Outcome: Progressing     Problem: CARDIOVASCULAR - ADULT  Goal: Maintains optimal cardiac output and hemodynamic stability  Description: INTERVENTIONS:  - Monitor vital signs, rhythm, and trends  - Monitor for bleeding, hypotension and signs of decreased  cardiac output  - Evaluate effectiveness of vasoactive medications to optimize hemodynamic stability  - Monitor arterial and/or venous puncture sites for bleeding and/or hematoma  - Assess quality of pulses, skin color and temperature  - Assess for signs of decreased coronary artery perfusion - ex. Angina  - Evaluate fluid balance, assess for edema, trend weights  Outcome: Progressing  Goal: Absence of cardiac arrhythmias or at baseline  Description: INTERVENTIONS:  - Continuous cardiac monitoring, monitor vital signs, obtain 12 lead EKG if indicated  - Evaluate effectiveness of antiarrhythmic and heart rate control medications as ordered  - Initiate emergency measures for life threatening arrhythmias  - Monitor electrolytes and administer replacement therapy as ordered  Outcome: Progressing

## 2024-11-07 NOTE — ED QUICK NOTES
Transport at bedside. Patient aware of plan of care, verbalizes understanding. Brought to floor with belongings. Family at bedside.

## 2024-11-08 LAB
ALBUMIN SERPL-MCNC: 3 G/DL (ref 3.2–4.8)
ALBUMIN/GLOB SERPL: 1.2 {RATIO} (ref 1–2)
ALP LIVER SERPL-CCNC: 65 U/L
ALT SERPL-CCNC: 19 U/L
ANION GAP SERPL CALC-SCNC: 6 MMOL/L (ref 0–18)
AST SERPL-CCNC: 31 U/L (ref ?–34)
BILIRUB SERPL-MCNC: 0.4 MG/DL (ref 0.2–0.9)
BUN BLD-MCNC: 15 MG/DL (ref 9–23)
BUN/CREAT SERPL: 18.1 (ref 10–20)
CALCIUM BLD-MCNC: 8.5 MG/DL (ref 8.7–10.4)
CHLORIDE SERPL-SCNC: 105 MMOL/L (ref 98–112)
CO2 SERPL-SCNC: 25 MMOL/L (ref 21–32)
CREAT BLD-MCNC: 0.83 MG/DL
DEPRECATED RDW RBC AUTO: 52.2 FL (ref 35.1–46.3)
EGFRCR SERPLBLD CKD-EPI 2021: 64 ML/MIN/1.73M2 (ref 60–?)
ERYTHROCYTE [DISTWIDTH] IN BLOOD BY AUTOMATED COUNT: 15.7 % (ref 11–15)
GLOBULIN PLAS-MCNC: 2.6 G/DL (ref 2–3.5)
GLUCOSE BLD-MCNC: 109 MG/DL (ref 70–99)
HCT VFR BLD AUTO: 36.1 %
HGB BLD-MCNC: 10.7 G/DL
MCH RBC QN AUTO: 26.6 PG (ref 26–34)
MCHC RBC AUTO-ENTMCNC: 29.6 G/DL (ref 31–37)
MCV RBC AUTO: 89.8 FL
OSMOLALITY SERPL CALC.SUM OF ELEC: 283 MOSM/KG (ref 275–295)
PLATELET # BLD AUTO: 427 10(3)UL (ref 150–450)
PLATELETS.RETICULATED NFR BLD AUTO: 3.6 % (ref 0–7)
POTASSIUM SERPL-SCNC: 3.8 MMOL/L (ref 3.5–5.1)
PROT SERPL-MCNC: 5.6 G/DL (ref 5.7–8.2)
RBC # BLD AUTO: 4.02 X10(6)UL
SODIUM SERPL-SCNC: 136 MMOL/L (ref 136–145)
WBC # BLD AUTO: 9.2 X10(3) UL (ref 4–11)

## 2024-11-08 PROCEDURE — 85027 COMPLETE CBC AUTOMATED: CPT | Performed by: STUDENT IN AN ORGANIZED HEALTH CARE EDUCATION/TRAINING PROGRAM

## 2024-11-08 PROCEDURE — 80053 COMPREHEN METABOLIC PANEL: CPT | Performed by: STUDENT IN AN ORGANIZED HEALTH CARE EDUCATION/TRAINING PROGRAM

## 2024-11-08 RX ORDER — POTASSIUM CHLORIDE 1500 MG/1
40 TABLET, EXTENDED RELEASE ORAL ONCE
Status: COMPLETED | OUTPATIENT
Start: 2024-11-08 | End: 2024-11-08

## 2024-11-08 RX ORDER — AMLODIPINE BESYLATE 5 MG/1
5 TABLET ORAL NIGHTLY
Status: DISCONTINUED | OUTPATIENT
Start: 2024-11-08 | End: 2024-11-10

## 2024-11-08 RX ORDER — LOSARTAN POTASSIUM 50 MG/1
50 TABLET ORAL ONCE
Status: COMPLETED | OUTPATIENT
Start: 2024-11-08 | End: 2024-11-08

## 2024-11-08 RX ORDER — FUROSEMIDE 20 MG/1
20 TABLET ORAL DAILY
Status: DISCONTINUED | OUTPATIENT
Start: 2024-11-08 | End: 2024-11-10

## 2024-11-08 RX ORDER — CARVEDILOL 25 MG/1
25 TABLET ORAL 2 TIMES DAILY WITH MEALS
Status: DISCONTINUED | OUTPATIENT
Start: 2024-11-08 | End: 2024-11-10

## 2024-11-08 RX ORDER — LOSARTAN POTASSIUM 100 MG/1
100 TABLET ORAL DAILY
Status: DISCONTINUED | OUTPATIENT
Start: 2024-11-09 | End: 2024-11-10

## 2024-11-08 NOTE — PROGRESS NOTES
[facility]  [facility]  Cardiology Progress Note    Marilu Dickens Patient Status:  Inpatient    1927 MRN [id number]   Location [facility] 3W/SW Attending Abigail Avina MD   Hosp Day # 2 PCP ADONIS QUIROZ MD, MD       Impression:  Shortness of breath 2/2 acute HFpEF  - Likely 2/2 hypertensive crisis  - Symptoms improved  Hypertension, elevated  Elevated troponin, not consistent with ACS. Likely Type II MI.  Hypokalemia     - Lasix 20mg PO  - Strict I/O, BMP, daily weight  - tele  - echo  - switch metoprolol to coreg 25mg BID  - losartan 100mg qam  - Add norvasc 5mg qpm  - Continue ASA + statin + zetia    Subjective:   Feels better now. No SOB, sitting in chair. States that she feels back to baseline.      Cardiac imaging:    TTE 10/7/24  1. Left ventricle: The cavity size was normal. Wall thickness was mildly      increased. Systolic function was at the lower limits of normal. The      estimated ejection fraction was 50-55%, by biplane method of disks. Left      ventricular diastolic function parameters were normal.   2. Left atrium: The atrium was mildly enlarged.     Patient Active Problem List   Diagnosis    CAD (coronary artery disease)    Carotid artery disease (HCC)    Dyslipidemia    Renal artery stenosis ([facility])    S/P CABG (coronary artery bypass graft)    Essential hypertension    Syncope and collapse    Hyponatremia    Leukocytosis    Seizure (HCC)    Acute exacerbation of CHF (congestive heart failure) (HCC)    Acute on chronic congestive heart failure, unspecified heart failure type (HCC)    Acute MI ([facility])       Objective:   Temp: 98.1 °F (36.7 °C)  Pulse: 80  Resp: 18  BP: 193/73    Intake/Output:     Intake/Output Summary (Last 24 hours) at 2024 1412  Last data filed at 2024 1300  Gross per 24 hour   Intake 430 ml   Output 700 ml   Net -270 ml       Last 3 Weights   24 0442 130 lb 8 oz (59.2 kg)   24 0150 134 lb (60.8 kg)   24 2143 134 lb  (60.8 kg)   11/06/24 1845 130 lb (59 kg)   08/19/23 1923 138 lb 0.1 oz (62.6 kg)   08/09/22 0941 138 lb (62.6 kg)       Tele: NSR    Physical Exam:    General: Alert and oriented x 3. No apparent distress. No respiratory or constitutional distress.  HEENT: Normocephalic, anicteric sclera, neck supple, no thyromegaly or adenopathy.  Neck: No JVD, carotids 2+, no bruits.  Cardiac: Regular rate and rhythm. S1, S2 normal. No murmur, pericardial rub, S3, thrill, heave or extra cardiac sounds.  Lungs: Clear without wheezes, rales, rhonchi or dullness.  Normal excursions and effort.  Abdomen: Soft, non-tender. No organosplenomegally, mass or rebound, BS-present.  Extremities: Without clubbing, cyanosis or edema.  Peripheral pulses are 2+.  Neurologic: Alert and oriented, normal affect. No motor or coordinational deficit.  Skin: Warm and dry.     Laboratory/Data:    Labs:         Recent Labs   Lab 11/06/24 1911 11/07/24 0609 11/08/24  0607   WBC 16.4* 12.6* 9.2   HGB 11.7* 10.6* 10.7*   MCV 84.7 83.2 89.8   .0* 431.0 427.0       Recent Labs   Lab 11/06/24 1911 11/07/24 0609 11/07/24  1549 11/08/24  0607    138  --  136   K 3.8 2.9* 3.9 3.8    102  --  105   CO2 29.0 29.0  --  25.0   BUN 19 16  --  15   CREATSERUM 1.02 0.87  --  0.83   CA 9.0 8.4*  --  8.5*   MG  --  1.9  --   --    * 107*  --  109*       Recent Labs   Lab 11/07/24 0609 11/08/24  0607   ALT 16 19   AST 28 31   ALB 3.1* 3.0*       No results for input(s): \"TROP\" in the last 168 hours.    Allergies:   Allergies[1]    Medications:  Current Facility-Administered Medications   Medication Dose Route Frequency    furosemide (Lasix) tab 20 mg  20 mg Oral Daily    [START ON 11/9/2024] losartan (Cozaar) tab 100 mg  100 mg Oral Daily    carvedilol (Coreg) tab 25 mg  25 mg Oral BID with meals    amLODIPine (Norvasc) tab 5 mg  5 mg Oral Nightly    ondansetron (Zofran) 4 MG/2ML injection 4 mg  4 mg Intravenous Q6H PRN    labetalol (Trandate)  5 mg/mL injection 10 mg  10 mg Intravenous Q6H PRN    hydrALAzine (Apresoline) 20 mg/mL injection 10 mg  10 mg Intravenous Q4H PRN    aspirin chewable tab 81 mg  81 mg Oral Daily    ezetimibe (Zetia) tab 10 mg  10 mg Oral Daily    atorvastatin (Lipitor) tab 20 mg  20 mg Oral Daily    acetaminophen (Tylenol Extra Strength) tab 500 mg  500 mg Oral Q4H PRN    heparin (Porcine) 5000 UNIT/ML injection 5,000 Units  5,000 Units Subcutaneous Q8H formerly [facility]       Dg Smith MD  11/8/2024  2:12 PM         [1]   Allergies  Allergen Reactions    Sulfa Antibiotics

## 2024-11-08 NOTE — PROGRESS NOTES
[facility]  part of [facility]    Progress Note    Marilu Dickens Patient Status:  Inpatient    1927 MRN [id number]   Location [facility] 3W/SW Attending Abigail Avina MD   Hosp Day # 2 PCP ADONIS QUIROZ MD, MD       Subjective:   Marilu Dickens is a(n) 89 year old female.   Less sob no chest pain ad agood night sleep  Objective:   Blood pressure (!) 165/71, pulse 86, temperature 98.4 °F (36.9 °C), temperature source Oral, resp. rate 18, height 5' 1\" (1.549 m), weight 130 lb 8 oz (59.2 kg), SpO2 95%.    HEENT: negative  Neck: no adenopathy, no carotid bruit, no JVD, supple, symmetrical, trachea midline and thyroid not enlarged, symmetric, no tenderness/mass/nodules  Pulmonary/Chest:  clear to auscultation bilaterally, no tenderness  Cardiovascular: S1, S2 normal, no S3 or S4, regular rate and rhythm, no murmur  Abdominal: normal findings: bowel sounds normal, soft, non-tender and no hepatosplenomegaly   Extremities: extremities normal, atraumatic, no cyanosis or edema  Skin: Skin color, texture, turgor normal. No rashes or lesions    Results:     Lab Results   Component Value Date    WBC 9.2 2024    HGB 10.7 (L) 2024    HCT 36.1 2024    .0 2024    CREATSERUM 0.83 2024    BUN 15 2024     2024    K 3.8 2024     2024    CO2 25.0 2024     (H) 2024    CA 8.5 (L) 2024    ALB 3.0 (L) 2024    ALKPHO 65 2024    BILT 0.4 2024    TP 5.6 (L) 2024    AST 31 2024    ALT 19 2024    MG 1.9 2024    TROP <0.046 2017       XR CHEST AP PORTABLE  (CPT=71045)    Result Date: 2024  CONCLUSION:  1. Mild CHF.    Dictated by (CST): Gerardo Cleaning MD on 2024 at 7:40 PM     Finalized by (CST): Gerardo Cleaning MD on 2024 at 7:42 PM         EKG 12 Lead    Result Date: 2024  Normal sinus rhythm Inferior infarct (cited on or before 11-SEP-2017)  Anterior infarct (cited on or before 11-SEP-2017) Abnormal ECG When compared with ECG of 11-SEP-2017 15:14, Questionable change in initial forces of Anterior leads Confirmed by JANINA MARIN ([id number]) on 11/7/2024 10:36:46 AM     Assessment and Plan:   Principal Problem:    Acute on chronic congestive heart failure, unspecified heart failure type (HCC)  Active Problems:    Renal artery stenosis (HCC)    S/P CABG (coronary artery bypass graft)    Essential hypertension    Seizure (HCC)    Acute exacerbation of CHF (congestive heart failure) (HCC)    Acute MI (HCC)     Felt better,less sob,no cesp pain,had agood night sleep.2D eccho LVEF 50 to 55%,continue lasix and toprol        ADONIS QUIROZ MD, MD  11/8/2024

## 2024-11-08 NOTE — PROGRESS NOTES
[facility]  part of [facility]     DM Hospitalist Progress Note     PCP: ADONIS QUIROZ MD, MD    CC: Follow up       Assessment/Plan:   Marilu Dickens is a 89 year old female with PMH sig for CAD post CABG in 1991, hyperlipidemia, hypertension, renal artery stenosis and recent admission to outside hospital with GI bleed with discharge hemoglobin of 8.5 who presented from her apartment at [place] with shortness of breath over the last 3 weeks.  Blood pressure is chronically elevated.  Troponin on admission was 67 with repeat relatively flat in the 70s.  Chest x-ray with bilateral pulmonary congestion.  proBNP 3338.       Acute on chronic congestive heart failure  - Follow-up echo, continue IV diuresis, monitor clinical response, renal function and lites->now on oral diuretic   -Daily weights and I's and O's     CAD status post CABG in 1991, hypertension poorly controlled with known renal artery stenosis, hyperlipidemia  - Patient states is hard time controlling blood pressure despite medications, given age allowed to drift higher  -Continue losartan, metoprolol->increase losartan to 100, titrate up as needed  - As needed hydralazine ordered     Hypokalemia   - Per protocol     FEN: No IV fluids, lites in a.m., cardiac diet     PPx: Heparin subcu     Dispo: Discussed CODE STATUS, unsure if she wants to make any limitations will discuss with family, full code for now     Deconditioning: pt is stand by assist, doing well     Questions/concerns were discussed with patient and/or family by bedside.    Note: This chart was prepared using voice recognition software and may contain unintended word substitution errors.       Thank You,  Abigail Avina M.D.  [facility] [place] [facility]  Answering Service: [phone]        Subjective     Feels well overall,. Hoping to genesis ome soon  No CP, SOB, or N/V.      Objective     OBJECTIVE:  Temp:  [98.1 °F (36.7 °C)-98.4 °F (36.9 °C)] 98.1 °F (36.7 °C)  Pulse:   [76-89] 80  Resp:  [18] 18  BP: (165-204)/(59-73) 193/73  SpO2:  [95 %-98 %] 97 %    Intake/Output:    Intake/Output Summary (Last 24 hours) at 11/8/2024 1416  Last data filed at 11/8/2024 1300  Gross per 24 hour   Intake 430 ml   Output 700 ml   Net -270 ml       Last 3 Weights   11/08/24 0442 130 lb 8 oz (59.2 kg)   11/07/24 0150 134 lb (60.8 kg)   11/06/24 2143 134 lb (60.8 kg)   11/06/24 1845 130 lb (59 kg)   08/19/23 1923 138 lb 0.1 oz (62.6 kg)   08/09/22 0941 138 lb (62.6 kg)       Exam  Gen: No acute distress, alert and oriented x3  Neck Supple, no JVD  Pulm: Lungs clear, normal respiratory effort, No wheezing or crackles  CV: Heart with regular rate and rhythm, No murmurs, rubs, gallops  Abd: Abdomen soft, nontender, nondistended, no organomegaly, bowel sounds present  MSK:  no clubbing, no cyanosis.  No Lower extremity edema  Skin: no rashes or lesions, well perfused  Psych: mood stable, cooperative  Neuro: no focal deficits    Medications      furosemide  20 mg Oral Daily    [START ON 11/9/2024] losartan  100 mg Oral Daily    carvedilol  25 mg Oral BID with meals    amLODIPine  5 mg Oral Nightly    aspirin  81 mg Oral Daily    ezetimibe  10 mg Oral Daily    atorvastatin  20 mg Oral Daily    heparin  5,000 Units Subcutaneous Q8H CURT         ondansetron    labetalol    hydrALAzine    acetaminophen    Data Review:       Labs:     Recent Labs   Lab 11/06/24 1911 11/07/24  0609 11/08/24  0607   WBC 16.4* 12.6* 9.2   HGB 11.7* 10.6* 10.7*   MCV 84.7 83.2 89.8   .0* 431.0 427.0       Recent Labs   Lab 11/06/24 1911 11/07/24  0609 11/07/24  1549 11/08/24  0607    138  --  136   K 3.8 2.9* 3.9 3.8    102  --  105   CO2 29.0 29.0  --  25.0   BUN 19 16  --  15   CREATSERUM 1.02 0.87  --  0.83   CA 9.0 8.4*  --  8.5*   MG  --  1.9  --   --    * 107*  --  109*       Recent Labs   Lab 11/07/24  0609 11/08/24  0607   ALT 16 19   AST 28 31   ALB 3.1* 3.0*       No results for input(s): \"PGLU\"  in the last 168 hours.    No results for input(s): \"TROP\" in the last 168 hours.    Imaging:  CARD ECHO 2D DOPPLER (CPT=93306)    Result Date: 2024  Transthoracic Echocardiogram Name:Marilu Dickens Date: 2024 :  1927 Ht:  (61in)  BP: 172 / 64 MRN:  [id number]    Age:  89years    Wt:  (134lb) HR: 72bpm Loc:  [facility]       Gndr: F          BSA: 1.59m^2 Sonographer: Leatha DELGADO Ordering:    Dg Smith Consulting:  Rona Baldwin ---------------------------------------------------------------------------- History/Indications:   Heart Failure. ---------------------------------------------------------------------------- Procedure information:  A transthoracic complete 2D study was performed. Additional evaluation included M-mode, complete spectral Doppler, and color Doppler.  Patient status:  Inpatient.  Location:  Bedside.    This was a routine study. Transthoracic echocardiography for diagnosis. Image quality was adequate. ECG rhythm:   Normal sinus ---------------------------------------------------------------------------- Conclusions: 1. Left ventricle: The cavity size was normal. Wall thickness was mildly    increased. Systolic function was at the lower limits of normal. The    estimated ejection fraction was 50-55%, by biplane method of disks. Left    ventricular diastolic function parameters were normal. 2. Left atrium: The atrium was mildly enlarged. * ---------------------------------------------------------------------------- * Findings: Left ventricle:  The cavity size was normal. Wall thickness was mildly increased. Systolic function was at the lower limits of normal. The estimated ejection fraction was 50-55%, by biplane method of disks. No diagnostic evidence for diffuse regional wall motion abnormalities. Left ventricular diastolic function parameters were normal. Left atrium:  Well visualized. The atrium was mildly enlarged. Right ventricle:  The cavity size was at the upper limits of normal.  Systolic function was normal. Right atrium:  Well visualized. The atrium was normal in size. Mitral valve:  Well visualized. The valve was structurally normal. The leaflets were normal thickness. Leaflet separation was normal. No evidence for prolapse.  Doppler:  Transvalvular velocity was within the normal range. There was no evidence for stenosis. There was trivial regurgitation. Aortic valve:  Well visualized.  The valve was trileaflet. The leaflets were normal thickness and mildly calcified. Cusp separation was normal.  Doppler:  Transvalvular velocity was within the normal range. There was no evidence for stenosis. There was no significant regurgitation.    The peak systolic gradient was 8mm Hg. Tricuspid valve:  The valve is structurally normal. Leaflet separation was normal.  Doppler:  Transvalvular velocity was within the normal range. There was no evidence for stenosis. There was no significant regurgitation. Pulmonic valve:   Well visualized. The annulus is normal-sized. The valve is structurally normal. The leaflets are normal thickness. Cusp separation was normal. No evidence for prolapse.  Doppler:  Transvalvular velocity was within the normal range. There was no evidence for stenosis. There was mild regurgitation. Pericardium:   There was no pericardial effusion. Pleura:  No evidence of pleural fluid accumulation. Aorta: Aortic root: The aortic root was normal-sized. The aortic root was normal. Ascending aorta: The ascending aorta was normal. The ascending aorta was normal. Pulmonary arteries: The main pulmonary artery was normal-sized. There was no evidence of pulmonary hypertension. Systemic veins:  Central venous respirophasic diameter changes are in the normal range (>50%). Inferior vena cava: The IVC was normally collapsible and normal-sized. The IVC was normal-sized. ---------------------------------------------------------------------------- Measurements  Left ventricle         Value        Ref        09/12/2017  IVS thickness, ED, (H) 1.5   cm     0.6 - 0.9 0.9  PLAX  LV ID, ED, PLAX        4.0   cm     3.8 - 5.2 5.4  LV ID, ES, PLAX        3.2   cm     2.2 - 3.5 4.3  LV PW thickness,   (H) 1.1   cm     0.6 - 0.9 0.8  ED, PLAX  IVS/LV PW ratio,       1.36         --------- 1.12  ED, PLAX  LV PW/LV ID ratio,     0.28         --------- ----------  ED, PLAX  LV ejection        (L) 41    %      54 - 74   40  fraction  Stroke volume/bsa,     39    ml/m^2 --------- ----------  2D  LV end-diastolic       58    ml     48 - 140  ----------  volume, 1-p A4C  LV ejection        (L) 44    %      46 - 78   ----------  fraction, 1-p A4C  Stroke volume, 1-p     25    ml     --------- ----------  A4C  LV end-diastolic       36    ml/m^2 30 - 82   ----------  volume/bsa, 1-p  A4C  Stroke volume/bsa,     16    ml/m^2 --------- ----------  1-p A4C  LV end-diastolic       65    ml     46 - 106  ----------  volume, 2-p  LV end-systolic        31    ml     14 - 42   ----------  volume, 2-p  LV ejection        (L) 51    %      54 - 74   ----------  fraction, 2-p  Stroke volume, 2-p     33    ml     --------- ----------  LV end-diastolic       41    ml/m^2 29 - 61   ----------  volume/bsa, 2-p  LV end-systolic        20    ml/m^2 8 - 24    ----------  volume/bsa, 2-p  Stroke volume/bsa,     20.8  ml/m^2 --------- ----------  2-p  LV e', lateral         10.1  cm/sec >=10.0    7.2  LV E/e', lateral       11           <=13      ----------  LV e', medial      (L) 6.9   cm/sec >=7.0     6.1  LV E/e', medial        16           --------- ----------  LV e', average         8.5   cm/sec --------- ----------  LV E/e', average       13           <=14      ----------  LVOT                   Value        Ref       09/12/2017  LVOT ID                1.9   cm     --------- ----------  LVOT peak              0.94  m/sec  --------- ----------  velocity, S  LVOT VTI, S            21.9  cm     --------- ----------  LVOT peak              4     mm  Hg  --------- ----------  gradient, S  LVOT mean              2     mm Hg  --------- ----------  gradient, S  Stroke volume          62    ml     --------- ----------  (SV), LVOT DP  Stroke index           39    ml/m^2 --------- ----------  (SV/bsa), LVOT DP  Aortic valve           Value        Ref       09/12/2017  Aortic leaflet         1.4   cm     --------- ----------  separation, MM  Aortic valve peak      1.45  m/sec  --------- ----------  velocity, S  Aortic peak            8     mm Hg  --------- ----------  gradient, S  Velocity ratio,        0.65         --------- ----------  peak, LVOT/AV  Aortic root            Value        Ref       09/12/2017  Aortic root ID,        2.0   cm     2.0 - 3.2 ----------  STJ, ED  Ascending aorta        Value        Ref       09/12/2017  Ascending aorta ID     3.1   cm     1.9 - 3.5 ----------  Left atrium            Value        Ref       09/12/2017  LA volume, S           50    ml     22 - 52   52  LA volume/bsa, S       31    ml/m^2 16 - 34   31  LA volume, ES, 1-p     43    ml     22 - 52   48  A4C  LA volume, ES, 1-p     49    ml     22 - 52   56  A2C  LA volume, ES, A/L     52    ml     --------- ----------  LA volume/bsa, ES,     33    ml/m^2 16 - 34   ----------  A/L  Mitral valve           Value        Ref       09/12/2017  Mitral E-wave peak     1.08  m/sec  --------- 0.99  velocity  Mitral A-wave peak     1.35  m/sec  --------- 1.27  velocity  Mitral                 183   ms     --------- ----------  deceleration time  Mitral peak            5     mm Hg  --------- 4  gradient, D  Mitral E/A ratio,      0.8          --------- ----------  peak  Pulmonary artery       Value        Ref       09/12/2017  PA pressure, ED,       8     mm Hg  --------- 9  DP  Systemic veins         Value        Ref       09/12/2017  Estimated CVP          3     mm Hg  --------- 5  Inferior vena cava     Value        Ref       09/12/2017  ID                     1.7   cm     <=2.1      ----------  Right ventricle        Value        Ref       09/12/2017  TAPSE, 2D              1.97  cm     >=1.70    ----------  TAPSE, MM              1.97  cm     >=1.70    ----------  RV s', lateral         9.8   cm/sec >=9.5     ----------  Pulmonic valve         Value        Ref       09/12/2017  Pulmonic regurg        1.4   m/sec  --------- ----------  peak velocity  Pulmonic regurg        8     mm Hg  --------- ----------  peak gradient  Pulmonic regurg        1.13  m/sec  --------- 0.95  velocity, ED  Pulmonic regurg        5     mm Hg  --------- ----------  gradient, ED Legend: (L)  and  (H)  renée values outside specified reference range. ---------------------------------------------------------------------------- Prepared and electronically signed by Eliseo Vázquez 11/07/2024 12:21     XR CHEST AP PORTABLE  (CPT=71045)    Result Date: 11/6/2024  PROCEDURE: XR CHEST AP PORTABLE  (CPT=71045) TIME: 1927 hours  COMPARISON: [facility], XR RIBS WITH CHEST (3 VIEWS), RIGHT (CPT=71101), 8/19/2023, 7:59 PM.  INDICATIONS: Difficulty in breathing today.  TECHNIQUE:   Single view.   FINDINGS:  CARDIAC/VASC: Post coronary artery bypass. Cardiomegaly. Pulmonary venous congestion. Atherosclerotic calcification aorta. MEDIAST/REMBERTO:   No visible mass or adenopathy. LUNGS/PLEURA: No consolidation. BONES: Post sternotomy. OTHER: Negative.          CONCLUSION:  1. Mild CHF.    Dictated by (CST): Gerardo Cleaning MD on 11/06/2024 at 7:40 PM     Finalized by (CST): Gerardo Cleaning MD on 11/06/2024 at 7:42 PM

## 2024-11-08 NOTE — PLAN OF CARE
Monitoring vital signs, stable at this time. No acute changes at this moment. Fall precautions in place-- bed alarm on, bed locked in lowest position, call light within reach. Frequent rounding by nursing staff.      Problem: Patient Centered Care  Goal: Patient preferences are identified and integrated in the patient's plan of care  Description: Interventions:  - What would you like us to know as we care for you? [facility]  - Provide timely, complete, and accurate information to patient/family  - Incorporate patient and family knowledge, values, beliefs, and cultural backgrounds into the planning and delivery of care  - Encourage patient/family to participate in care and decision-making at the level they choose  - Honor patient and family perspectives and choices  Outcome: Progressing     Problem: Patient/Family Goals  Goal: Patient/Family Long Term Goal  Description: Patient's Long Term Goal: To get discharged    Interventions:  - Monitor vital signs   - Monitor appropriate labs   - Pain management   - Administer medications per order   - Follow MD orders   - Diagnostics per order   - Update/inform patient and family on plan of care   - Discharge planning   - See additional Care Plan goals for specific interventions  Outcome: Progressing  Goal: Patient/Family Short Term Goal  Description: Patient's Short Term Goal: To get better    Interventions:   - Monitor vital signs   - Monitor appropriate labs  - Pain management   - Administer medications per order   - Follow MD orders   - Diagnostics per order   - Update/inform patient and family on plan of care   - Discharge planning   - See additional Care Plan goals for specific interventions  Outcome: Progressing     Problem: CARDIOVASCULAR - ADULT  Goal: Maintains optimal cardiac output and hemodynamic stability  Description: INTERVENTIONS:  - Monitor vital signs, rhythm, and trends  - Monitor for bleeding, hypotension and signs of decreased  cardiac output  - Evaluate effectiveness of vasoactive medications to optimize hemodynamic stability  - Monitor arterial and/or venous puncture sites for bleeding and/or hematoma  - Assess quality of pulses, skin color and temperature  - Assess for signs of decreased coronary artery perfusion - ex. Angina  - Evaluate fluid balance, assess for edema, trend weights  Outcome: Progressing  Goal: Absence of cardiac arrhythmias or at baseline  Description: INTERVENTIONS:  - Continuous cardiac monitoring, monitor vital signs, obtain 12 lead EKG if indicated  - Evaluate effectiveness of antiarrhythmic and heart rate control medications as ordered  - Initiate emergency measures for life threatening arrhythmias  - Monitor electrolytes and administer replacement therapy as ordered  Outcome: Progressing

## 2024-11-09 LAB
ALBUMIN SERPL-MCNC: 3 G/DL (ref 3.2–4.8)
ALBUMIN/GLOB SERPL: 1.3 {RATIO} (ref 1–2)
ALP LIVER SERPL-CCNC: 62 U/L
ALT SERPL-CCNC: 25 U/L
ANION GAP SERPL CALC-SCNC: 6 MMOL/L (ref 0–18)
AST SERPL-CCNC: 37 U/L (ref ?–34)
BILIRUB SERPL-MCNC: 0.4 MG/DL (ref 0.2–0.9)
BUN BLD-MCNC: 19 MG/DL (ref 9–23)
BUN/CREAT SERPL: 22.4 (ref 10–20)
CALCIUM BLD-MCNC: 8.6 MG/DL (ref 8.7–10.4)
CHLORIDE SERPL-SCNC: 106 MMOL/L (ref 98–112)
CO2 SERPL-SCNC: 27 MMOL/L (ref 21–32)
CREAT BLD-MCNC: 0.85 MG/DL
DEPRECATED RDW RBC AUTO: 47 FL (ref 35.1–46.3)
EGFRCR SERPLBLD CKD-EPI 2021: 62 ML/MIN/1.73M2 (ref 60–?)
ERYTHROCYTE [DISTWIDTH] IN BLOOD BY AUTOMATED COUNT: 15.4 % (ref 11–15)
GLOBULIN PLAS-MCNC: 2.4 G/DL (ref 2–3.5)
GLUCOSE BLD-MCNC: 99 MG/DL (ref 70–99)
HCT VFR BLD AUTO: 30.7 %
HGB BLD-MCNC: 10.2 G/DL
MAGNESIUM SERPL-MCNC: 1.9 MG/DL (ref 1.6–2.6)
MCH RBC QN AUTO: 27.6 PG (ref 26–34)
MCHC RBC AUTO-ENTMCNC: 33.2 G/DL (ref 31–37)
MCV RBC AUTO: 83.2 FL
OSMOLALITY SERPL CALC.SUM OF ELEC: 290 MOSM/KG (ref 275–295)
PLATELET # BLD AUTO: 426 10(3)UL (ref 150–450)
POTASSIUM SERPL-SCNC: 4.2 MMOL/L (ref 3.5–5.1)
POTASSIUM SERPL-SCNC: 4.2 MMOL/L (ref 3.5–5.1)
PROT SERPL-MCNC: 5.4 G/DL (ref 5.7–8.2)
RBC # BLD AUTO: 3.69 X10(6)UL
SODIUM SERPL-SCNC: 139 MMOL/L (ref 136–145)
WBC # BLD AUTO: 8.6 X10(3) UL (ref 4–11)

## 2024-11-09 PROCEDURE — 83735 ASSAY OF MAGNESIUM: CPT | Performed by: HOSPITALIST

## 2024-11-09 PROCEDURE — 85027 COMPLETE CBC AUTOMATED: CPT | Performed by: STUDENT IN AN ORGANIZED HEALTH CARE EDUCATION/TRAINING PROGRAM

## 2024-11-09 PROCEDURE — 84132 ASSAY OF SERUM POTASSIUM: CPT | Performed by: HOSPITALIST

## 2024-11-09 PROCEDURE — 80053 COMPREHEN METABOLIC PANEL: CPT | Performed by: STUDENT IN AN ORGANIZED HEALTH CARE EDUCATION/TRAINING PROGRAM

## 2024-11-09 NOTE — PROGRESS NOTES
[facility]  [facility]  Cardiology Progress Note    Marilu Dickens Patient Status:  Inpatient    1927 MRN [id number]   Location [facility] 3W/SW Attending Abigail Avina MD   Hosp Day # 3 PCP ADONIS QUIROZ MD, MD       Impression:  Shortness of breath 2/2 acute HFpEF  - Likely 2/2 hypertensive crisis  - Symptoms improved  Hypertension, improved since last night   Elevated troponin, not consistent with ACS. Likely Type II MI.  Hypokalemia: resolved      - Lasix 20mg PO. Net negative 736 ML. Renal function is stable   - Strict I/O, BMP, daily weight  - tele  - echo with low normal EF and no significant valvualr disease   - coreg 25mg BID  - losartan 100mg qam  - Amlodipine  5mg qpm  - Continue ASA + statin + zetia  -Likely discharge tomorrow vs Monday     Subjective:   No acute issues overnight       Cardiac imaging:    TTE 10/7/24  1. Left ventricle: The cavity size was normal. Wall thickness was mildly      increased. Systolic function was at the lower limits of normal. The      estimated ejection fraction was 50-55%, by biplane method of disks. Left      ventricular diastolic function parameters were normal.   2. Left atrium: The atrium was mildly enlarged.     Patient Active Problem List   Diagnosis    CAD (coronary artery disease)    Carotid artery disease ([facility])    Dyslipidemia    Renal artery stenosis ([facility])    S/P CABG (coronary artery bypass graft)    Essential hypertension    Syncope and collapse    Hyponatremia    Leukocytosis    Seizure ([facility])    Acute exacerbation of CHF (congestive heart failure) ([facility])    Acute on chronic congestive heart failure, unspecified heart failure type ([facility])    Acute MI ([facility])       Objective:   Temp: 99.5 °F (37.5 °C)  Pulse: 83  Resp: 15  BP: 145/56    Intake/Output:     Intake/Output Summary (Last 24 hours) at 2024 0838  Last data filed at 2024 0600  Gross per 24 hour   Intake 135 ml   Output 600 ml   Net -465 ml       Last 3 Weights    11/09/24 0341 131 lb 12.8 oz (59.8 kg)   11/08/24 0442 130 lb 8 oz (59.2 kg)   11/07/24 0150 134 lb (60.8 kg)   11/06/24 2143 134 lb (60.8 kg)   11/06/24 1845 130 lb (59 kg)   08/19/23 1923 138 lb 0.1 oz (62.6 kg)   08/09/22 0941 138 lb (62.6 kg)       Tele: NSR    Physical Exam:    General: Alert and oriented x 3. No apparent distress. No respiratory or constitutional distress.  HEENT: Normocephalic, anicteric sclera, neck supple, no thyromegaly or adenopathy.  Neck: No JVD, carotids 2+, no bruits.  Cardiac: Regular rate and rhythm. S1, S2 normal. No murmur, pericardial rub, S3, thrill, heave or extra cardiac sounds.  Lungs: Clear without wheezes, rales, rhonchi or dullness.  Normal excursions and effort.  Abdomen: Soft, non-tender. No organosplenomegally, mass or rebound, BS-present.  Extremities: Without clubbing, cyanosis or edema.  Peripheral pulses are 2+.  Neurologic: Alert and oriented, normal affect. No motor or coordinational deficit.  Skin: Warm and dry.     Laboratory/Data:    Labs:         Recent Labs   Lab 11/06/24 1911 11/07/24  0609 11/08/24  0607 11/09/24  0710   WBC 16.4* 12.6* 9.2 8.6   HGB 11.7* 10.6* 10.7* 10.2*   MCV 84.7 83.2 89.8 83.2   .0* 431.0 427.0 426.0       Recent Labs   Lab 11/06/24 1911 11/07/24  0609 11/07/24  1549 11/08/24  0607 11/09/24  0710    138  --  136 139   K 3.8 2.9* 3.9 3.8 4.2  4.2    102  --  105 106   CO2 29.0 29.0  --  25.0 27.0   BUN 19 16  --  15 19   CREATSERUM 1.02 0.87  --  0.83 0.85   CA 9.0 8.4*  --  8.5* 8.6*   MG  --  1.9  --   --  1.9   * 107*  --  109* 99       Recent Labs   Lab 11/07/24  0609 11/08/24  0607 11/09/24  0710   ALT 16 19 25   AST 28 31 37*   ALB 3.1* 3.0* 3.0*       No results for input(s): \"TROP\" in the last 168 hours.    Allergies:   Allergies[1]    Medications:  Current Facility-Administered Medications   Medication Dose Route Frequency    furosemide (Lasix) tab 20 mg  20 mg Oral Daily    losartan (Cozaar) tab  100 mg  100 mg Oral Daily    carvedilol (Coreg) tab 25 mg  25 mg Oral BID with meals    amLODIPine (Norvasc) tab 5 mg  5 mg Oral Nightly    ondansetron (Zofran) 4 MG/2ML injection 4 mg  4 mg Intravenous Q6H PRN    labetalol (Trandate) 5 mg/mL injection 10 mg  10 mg Intravenous Q6H PRN    hydrALAzine (Apresoline) 20 mg/mL injection 10 mg  10 mg Intravenous Q4H PRN    aspirin chewable tab 81 mg  81 mg Oral Daily    ezetimibe (Zetia) tab 10 mg  10 mg Oral Daily    atorvastatin (Lipitor) tab 20 mg  20 mg Oral Daily    acetaminophen (Tylenol Extra Strength) tab 500 mg  500 mg Oral Q4H PRN    heparin (Porcine) 5000 UNIT/ML injection 5,000 Units  5,000 Units Subcutaneous Q8H [facility], 1928–1983       Vincent Hampton MD           [1]   Allergies  Allergen Reactions    Sulfa Antibiotics

## 2024-11-09 NOTE — PLAN OF CARE
Patient's BP more controlled today. Cardiology optimizing cardiac meds and monitoring pressures. No acute complaints at this time. Possible discharge tomorrow.     Problem: CARDIOVASCULAR - ADULT  Goal: Maintains optimal cardiac output and hemodynamic stability  Description: INTERVENTIONS:  - Monitor vital signs, rhythm, and trends  - Monitor for bleeding, hypotension and signs of decreased cardiac output  - Evaluate effectiveness of vasoactive medications to optimize hemodynamic stability  - Monitor arterial and/or venous puncture sites for bleeding and/or hematoma  - Assess quality of pulses, skin color and temperature  - Assess for signs of decreased coronary artery perfusion - ex. Angina  - Evaluate fluid balance, assess for edema, trend weights  Outcome: Progressing  Goal: Absence of cardiac arrhythmias or at baseline  Description: INTERVENTIONS:  - Continuous cardiac monitoring, monitor vital signs, obtain 12 lead EKG if indicated  - Evaluate effectiveness of antiarrhythmic and heart rate control medications as ordered  - Initiate emergency measures for life threatening arrhythmias  - Monitor electrolytes and administer replacement therapy as ordered  Outcome: Progressing     Problem: RISK FOR INFECTION - ADULT  Goal: Absence of fever/infection during anticipated neutropenic period  Description: INTERVENTIONS  - Monitor WBC  - Administer growth factors as ordered  - Implement neutropenic guidelines  Outcome: Progressing     Problem: SAFETY ADULT - FALL  Goal: Free from fall injury  Description: INTERVENTIONS:  - Assess pt frequently for physical needs  - Identify cognitive and physical deficits and behaviors that affect risk of falls.  - [place] fall precautions as indicated by assessment.  - Educate pt/family on patient safety including physical limitations  - Instruct pt to call for assistance with activity based on assessment  - Modify environment to reduce risk of injury  - Provide assistive devices as  appropriate  - Consider OT/PT consult to assist with strengthening/mobility  - Encourage toileting schedule  Outcome: Progressing

## 2024-11-09 NOTE — PLAN OF CARE
Stable vital signs. P.o lasix continues . Plan: Back to [facility].    Problem: Patient Centered Care  Goal: Patient preferences are identified and integrated in the patient's plan of care  Description: Interventions:  - What would you like us to know as we care for you? Lives at [facility] .  - Provide timely, complete, and accurate information to patient/family  - Incorporate patient and family knowledge, values, beliefs, and cultural backgrounds into the planning and delivery of care  - Encourage patient/family to participate in care and decision-making at the level they choose  - Honor patient and family perspectives and choices  Outcome: Progressing     Problem: CARDIOVASCULAR - ADULT  Goal: Maintains optimal cardiac output and hemodynamic stability  Description: INTERVENTIONS:  - Monitor vital signs, rhythm, and trends  - Monitor for bleeding, hypotension and signs of decreased cardiac output  - Evaluate effectiveness of vasoactive medications to optimize hemodynamic stability  - Monitor arterial and/or venous puncture sites for bleeding and/or hematoma  - Assess quality of pulses, skin color and temperature  - Assess for signs of decreased coronary artery perfusion - ex. Angina  - Evaluate fluid balance, assess for edema, trend weights  Outcome: Progressing  Goal: Absence of cardiac arrhythmias or at baseline  Description: INTERVENTIONS:  - Continuous cardiac monitoring, monitor vital signs, obtain 12 lead EKG if indicated  - Evaluate effectiveness of antiarrhythmic and heart rate control medications as ordered  - Initiate emergency measures for life threatening arrhythmias  - Monitor electrolytes and administer replacement therapy as ordered  Outcome: Progressing     Problem: RISK FOR INFECTION - ADULT  Goal: Absence of fever/infection during anticipated neutropenic period  Description: INTERVENTIONS  - Monitor WBC  - Administer growth factors as ordered  - Implement neutropenic  guidelines  Outcome: Progressing     Problem: SAFETY ADULT - FALL  Goal: Free from fall injury  Description: INTERVENTIONS:  - Assess pt frequently for physical needs  - Identify cognitive and physical deficits and behaviors that affect risk of falls.  - [place] fall precautions as indicated by assessment.  - Educate pt/family on patient safety including physical limitations  - Instruct pt to call for assistance with activity based on assessment  - Modify environment to reduce risk of injury  - Provide assistive devices as appropriate  - Consider OT/PT consult to assist with strengthening/mobility  - Encourage toileting schedule  Outcome: Progressing

## 2024-11-09 NOTE — PROGRESS NOTES
[facility]  part of [facility]    Progress Note    Marilu Dickens Patient Status:  Inpatient    1927 MRN [id number]   Location [facility] 3W/SW Attending Abigail Avina MD   Hosp Day # 3 PCP ADONIS QUIROZ MD, MD       Subjective:   Marilu Dickens is a(n) 89 year old female.   Less sob no chest pain ad agood night sleep  Objective:   Blood pressure 146/70, pulse 78, temperature 97.6 °F (36.4 °C), temperature source Oral, resp. rate 18, height 5' 1\" (1.549 m), weight 131 lb 12.8 oz (59.8 kg), SpO2 95%.    HEENT: negative  Neck: no adenopathy, no carotid bruit, no JVD, supple, symmetrical, trachea midline and thyroid not enlarged, symmetric, no tenderness/mass/nodules  Pulmonary/Chest:  clear to auscultation bilaterally, no tenderness  Cardiovascular: S1, S2 normal, no S3 or S4, regular rate and rhythm, no murmur  Abdominal: normal findings: bowel sounds normal, soft, non-tender and no hepatosplenomegaly   Extremities: extremities normal, atraumatic, no cyanosis or edema  Skin: Skin color, texture, turgor normal. No rashes or lesions    Results:     Lab Results   Component Value Date    WBC 8.6 2024    HGB 10.2 (L) 2024    HCT 30.7 (L) 2024    .0 2024    CREATSERUM 0.85 2024    BUN 19 2024     2024    K 4.2 2024    K 4.2 2024     2024    CO2 27.0 2024    GLU 99 2024    CA 8.6 (L) 2024    ALB 3.0 (L) 2024    ALKPHO 62 2024    BILT 0.4 2024    TP 5.4 (L) 2024    AST 37 (H) 2024    ALT 25 2024    MG 1.9 2024    TROP <0.046 2017       No results found.        Assessment and Plan:   Principal Problem:    Acute on chronic congestive heart failure, unspecified heart failure type ([facility])  Active Problems:    Renal artery stenosis ([facility])    S/P CABG (coronary artery bypass graft)    Essential hypertension    Seizure ([facility])    Acute exacerbation of CHF  (congestive heart failure) (HCC)    Acute MI (H[facility])     Felt better,less sob,no cesp pain,had agood night sleep.2D eccho LVEF 50 to 55%,continue lasix and toprol        Monica Yusuf MD, MD  11/8/2024

## 2024-11-09 NOTE — PROGRESS NOTES
[facility]  part of [facility]     [facility] Hospitalist Progress Note     PCP: ADONIS QUIROZ MD, MD    CC: Follow up       Assessment/Plan:   Marilu Dickens is a 89 year old female with PMH sig for CAD post CABG in 1991, hyperlipidemia, hypertension, renal artery stenosis and recent admission to outside hospital with GI bleed with discharge hemoglobin of 8.5 who presented from her apartment at [place] with shortness of breath over the last 3 weeks.  Blood pressure is chronically elevated.  Troponin on admission was 67 with repeat relatively flat in the 70s.  Chest x-ray with bilateral pulmonary congestion.  proBNP 3338.  Blood pressure very labile     Acute on chronic congestive heart failure  - Follow-up echo, continue IV diuresis, monitor clinical response, renal function and lites->now on oral diuretic   -Daily weights and I's and O's     CAD status post CABG in 1991, hypertension poorly controlled with known renal artery stenosis, hyperlipidemia  - Patient states is hard time controlling blood pressure despite medications, given age allowed to drift higher  -Continue losartan, metoprolol->increase losartan to 100, titrate up as needed  - As needed hydralazine ordered  - norvasc added per cards, monitor      Hypokalemia   - Per protocol     FEN: No IV fluids, lites in a.m., cardiac diet     PPx: Heparin subcu     Dispo: Discussed CODE STATUS, unsure if she wants to make any limitations will discuss with family, full code for now     Deconditioning: pt is stand by assist, doing well     Questions/concerns were discussed with patient and/or family by bedside.    Note: This chart was prepared using voice recognition software and may contain unintended word substitution errors.       Thank You,  Abigail Avina M.D.  [facility] Hospitalist  Answering Service: [phone]        Subjective     Feels well overall,. Hoping to go home tomorrow   No CP, SOB, or N/V.      Objective     OBJECTIVE:  Temp:   [98.3 °F (36.8 °C)-99.5 °F (37.5 °C)] 98.3 °F (36.8 °C)  Pulse:  [75-87] 75  Resp:  [14-18] 18  BP: (103-197)/(49-80) 143/61  SpO2:  [95 %-96 %] 95 %    Intake/Output:    Intake/Output Summary (Last 24 hours) at 11/9/2024 1304  Last data filed at 11/9/2024 1251  Gross per 24 hour   Intake 455 ml   Output 200 ml   Net 255 ml       Last 3 Weights   11/09/24 0341 131 lb 12.8 oz (59.8 kg)   11/08/24 0442 130 lb 8 oz (59.2 kg)   11/07/24 0150 134 lb (60.8 kg)   11/06/24 2143 134 lb (60.8 kg)   11/06/24 1845 130 lb (59 kg)   08/19/23 1923 138 lb 0.1 oz (62.6 kg)   08/09/22 0941 138 lb (62.6 kg)       Exam  Gen: No acute distress, alert and oriented x3  Neck Supple, no JVD  Pulm: Lungs clear, normal respiratory effort, No wheezing or crackles  CV: Heart with regular rate and rhythm, No murmurs, rubs, gallops  Abd: Abdomen soft, nontender, nondistended, no organomegaly, bowel sounds present  MSK:  no clubbing, no cyanosis.  No Lower extremity edema  Skin: no rashes or lesions, well perfused  Psych: mood stable, cooperative  Neuro: no focal deficits    Medications      furosemide  20 mg Oral Daily    losartan  100 mg Oral Daily    carvedilol  25 mg Oral BID with meals    amLODIPine  5 mg Oral Nightly    aspirin  81 mg Oral Daily    ezetimibe  10 mg Oral Daily    atorvastatin  20 mg Oral Daily    heparin  5,000 Units Subcutaneous Q8H CURT         ondansetron    labetalol    hydrALAzine    acetaminophen    Data Review:       Labs:     Recent Labs   Lab 11/06/24  1911 11/07/24  0609 11/08/24  0607 11/09/24  0710   WBC 16.4* 12.6* 9.2 8.6   HGB 11.7* 10.6* 10.7* 10.2*   MCV 84.7 83.2 89.8 83.2   .0* 431.0 427.0 426.0       Recent Labs   Lab 11/06/24  1911 11/07/24  0609 11/07/24  1549 11/08/24  0607 11/09/24  0710    138  --  136 139   K 3.8 2.9* 3.9 3.8 4.2  4.2    102  --  105 106   CO2 29.0 29.0  --  25.0 27.0   BUN 19 16  --  15 19   CREATSERUM 1.02 0.87  --  0.83 0.85   CA 9.0 8.4*  --  8.5* 8.6*   MG  --   1.9  --   --  1.9   * 107*  --  109* 99       Recent Labs   Lab 24  0609 24  0607 24  0710   ALT 16 19 25   AST 28 31 37*   ALB 3.1* 3.0* 3.0*       No results for input(s): \"PGLU\" in the last 168 hours.    No results for input(s): \"TROP\" in the last 168 hours.    Imaging:  CARD ECHO 2D DOPPLER (CPT=93306)    Result Date: 2024  Transthoracic Echocardiogram Name:Marilu Dickens Date: 2024 :  1927 Ht:  (61in)  BP: 172 / 64 MRN:  [id number]    Age:  89years    Wt:  (134lb) HR: 72bpm Loc:  [facility]       Gndr: F          BSA: 1.59m^2 Sonographer: Leatha DELGADO Ordering:    Dg Smith Consulting:  Rona Baldwin ---------------------------------------------------------------------------- History/Indications:   Heart Failure. ---------------------------------------------------------------------------- Procedure information:  A transthoracic complete 2D study was performed. Additional evaluation included M-mode, complete spectral Doppler, and color Doppler.  Patient status:  Inpatient.  Location:  Bedside.    This was a routine study. Transthoracic echocardiography for diagnosis. Image quality was adequate. ECG rhythm:   Normal sinus ---------------------------------------------------------------------------- Conclusions: 1. Left ventricle: The cavity size was normal. Wall thickness was mildly    increased. Systolic function was at the lower limits of normal. The    estimated ejection fraction was 50-55%, by biplane method of disks. Left    ventricular diastolic function parameters were normal. 2. Left atrium: The atrium was mildly enlarged. * ---------------------------------------------------------------------------- * Findings: Left ventricle:  The cavity size was normal. Wall thickness was mildly increased. Systolic function was at the lower limits of normal. The estimated ejection fraction was 50-55%, by biplane method of disks. No diagnostic evidence for diffuse regional wall motion  abnormalities. Left ventricular diastolic function parameters were normal. Left atrium:  Well visualized. The atrium was mildly enlarged. Right ventricle:  The cavity size was at the upper limits of normal. Systolic function was normal. Right atrium:  Well visualized. The atrium was normal in size. Mitral valve:  Well visualized. The valve was structurally normal. The leaflets were normal thickness. Leaflet separation was normal. No evidence for prolapse.  Doppler:  Transvalvular velocity was within the normal range. There was no evidence for stenosis. There was trivial regurgitation. Aortic valve:  Well visualized.  The valve was trileaflet. The leaflets were normal thickness and mildly calcified. Cusp separation was normal.  Doppler:  Transvalvular velocity was within the normal range. There was no evidence for stenosis. There was no significant regurgitation.    The peak systolic gradient was 8mm Hg. Tricuspid valve:  The valve is structurally normal. Leaflet separation was normal.  Doppler:  Transvalvular velocity was within the normal range. There was no evidence for stenosis. There was no significant regurgitation. Pulmonic valve:   Well visualized. The annulus is normal-sized. The valve is structurally normal. The leaflets are normal thickness. Cusp separation was normal. No evidence for prolapse.  Doppler:  Transvalvular velocity was within the normal range. There was no evidence for stenosis. There was mild regurgitation. Pericardium:   There was no pericardial effusion. Pleura:  No evidence of pleural fluid accumulation. Aorta: Aortic root: The aortic root was normal-sized. The aortic root was normal. Ascending aorta: The ascending aorta was normal. The ascending aorta was normal. Pulmonary arteries: The main pulmonary artery was normal-sized. There was no evidence of pulmonary hypertension. Systemic veins:  Central venous respirophasic diameter changes are in the normal range (>50%). Inferior vena cava:  The IVC was normally collapsible and normal-sized. The IVC was normal-sized. ---------------------------------------------------------------------------- Measurements  Left ventricle         Value        Ref       09/12/2017  IVS thickness, ED, (H) 1.5   cm     0.6 - 0.9 0.9  PLAX  LV ID, ED, PLAX        4.0   cm     3.8 - 5.2 5.4  LV ID, ES, PLAX        3.2   cm     2.2 - 3.5 4.3  LV PW thickness,   (H) 1.1   cm     0.6 - 0.9 0.8  ED, PLAX  IVS/LV PW ratio,       1.36         --------- 1.12  ED, PLAX  LV PW/LV ID ratio,     0.28         --------- ----------  ED, PLAX  LV ejection        (L) 41    %      54 - 74   40  fraction  Stroke volume/bsa,     39    ml/m^2 --------- ----------  2D  LV end-diastolic       58    ml     48 - 140  ----------  volume, 1-p A4C  LV ejection        (L) 44    %      46 - 78   ----------  fraction, 1-p A4C  Stroke volume, 1-p     25    ml     --------- ----------  A4C  LV end-diastolic       36    ml/m^2 30 - 82   ----------  volume/bsa, 1-p  A4C  Stroke volume/bsa,     16    ml/m^2 --------- ----------  1-p A4C  LV end-diastolic       65    ml     46 - 106  ----------  volume, 2-p  LV end-systolic        31    ml     14 - 42   ----------  volume, 2-p  LV ejection        (L) 51    %      54 - 74   ----------  fraction, 2-p  Stroke volume, 2-p     33    ml     --------- ----------  LV end-diastolic       41    ml/m^2 29 - 61   ----------  volume/bsa, 2-p  LV end-systolic        20    ml/m^2 8 - 24    ----------  volume/bsa, 2-p  Stroke volume/bsa,     20.8  ml/m^2 --------- ----------  2-p  LV e', lateral         10.1  cm/sec >=10.0    7.2  LV E/e', lateral       11           <=13      ----------  LV e', medial      (L) 6.9   cm/sec >=7.0     6.1  LV E/e', medial        16           --------- ----------  LV e', average         8.5   cm/sec --------- ----------  LV E/e', average       13           <=14      ----------  LVOT                   Value        Ref       09/12/2017  LVOT ID                 1.9   cm     --------- ----------  LVOT peak              0.94  m/sec  --------- ----------  velocity, S  LVOT VTI, S            21.9  cm     --------- ----------  LVOT peak              4     mm Hg  --------- ----------  gradient, S  LVOT mean              2     mm Hg  --------- ----------  gradient, S  Stroke volume          62    ml     --------- ----------  (SV), LVOT DP  Stroke index           39    ml/m^2 --------- ----------  (SV/bsa), LVOT DP  Aortic valve           Value        Ref       09/12/2017  Aortic leaflet         1.4   cm     --------- ----------  separation, MM  Aortic valve peak      1.45  m/sec  --------- ----------  velocity, S  Aortic peak            8     mm Hg  --------- ----------  gradient, S  Velocity ratio,        0.65         --------- ----------  peak, LVOT/AV  Aortic root            Value        Ref       09/12/2017  Aortic root ID,        2.0   cm     2.0 - 3.2 ----------  STJ, ED  Ascending aorta        Value        Ref       09/12/2017  Ascending aorta ID     3.1   cm     1.9 - 3.5 ----------  Left atrium            Value        Ref       09/12/2017  LA volume, S           50    ml     22 - 52   52  LA volume/bsa, S       31    ml/m^2 16 - 34   31  LA volume, ES, 1-p     43    ml     22 - 52   48  A4C  LA volume, ES, 1-p     49    ml     22 - 52   56  A2C  LA volume, ES, A/L     52    ml     --------- ----------  LA volume/bsa, ES,     33    ml/m^2 16 - 34   ----------  A/L  Mitral valve           Value        Ref       09/12/2017  Mitral E-wave peak     1.08  m/sec  --------- 0.99  velocity  Mitral A-wave peak     1.35  m/sec  --------- 1.27  velocity  Mitral                 183   ms     --------- ----------  deceleration time  Mitral peak            5     mm Hg  --------- 4  gradient, D  Mitral E/A ratio,      0.8          --------- ----------  peak  Pulmonary artery       Value        Ref       09/12/2017  PA pressure, ED,       8     mm Hg  --------- 9  DP  Systemic  veins         Value        Ref       09/12/2017  Estimated CVP          3     mm Hg  --------- 5  Inferior vena cava     Value        Ref       09/12/2017  ID                     1.7   cm     <=2.1     ----------  Right ventricle        Value        Ref       09/12/2017  TAPSE, 2D              1.97  cm     >=1.70    ----------  TAPSE, MM              1.97  cm     >=1.70    ----------  RV s', lateral         9.8   cm/sec >=9.5     ----------  Pulmonic valve         Value        Ref       09/12/2017  Pulmonic regurg        1.4   m/sec  --------- ----------  peak velocity  Pulmonic regurg        8     mm Hg  --------- ----------  peak gradient  Pulmonic regurg        1.13  m/sec  --------- 0.95  velocity, ED  Pulmonic regurg        5     mm Hg  --------- ----------  gradient, ED Legend: (L)  and  (H)  renée values outside specified reference range. ---------------------------------------------------------------------------- Prepared and electronically signed by Eliseo Vázquez 11/07/2024 12:21     XR CHEST AP PORTABLE  (CPT=71045)    Result Date: 11/6/2024  PROCEDURE: XR CHEST AP PORTABLE  (CPT=71045) TIME: 1927 hours  COMPARISON: [facility], XR RIBS WITH CHEST (3 VIEWS), RIGHT (CPT=71101), 8/19/2023, 7:59 PM.  INDICATIONS: Difficulty in breathing today.  TECHNIQUE:   Single view.   FINDINGS:  CARDIAC/VASC: Post coronary artery bypass. Cardiomegaly. Pulmonary venous congestion. Atherosclerotic calcification aorta. MEDIAST/REMBERTO:   No visible mass or adenopathy. LUNGS/PLEURA: No consolidation. BONES: Post sternotomy. OTHER: Negative.          CONCLUSION:  1. Mild CHF.    Dictated by (CST): Gerardo Cleaning MD on 11/06/2024 at 7:40 PM     Finalized by (CST): Gerardo Cleaning MD on 11/06/2024 at 7:42 PM

## 2024-11-10 VITALS
DIASTOLIC BLOOD PRESSURE: 56 MMHG | OXYGEN SATURATION: 93 % | WEIGHT: 128.38 LBS | HEART RATE: 76 BPM | RESPIRATION RATE: 16 BRPM | BODY MASS INDEX: 24.24 KG/M2 | TEMPERATURE: 99 F | SYSTOLIC BLOOD PRESSURE: 109 MMHG | HEIGHT: 61 IN

## 2024-11-10 LAB
ALBUMIN SERPL-MCNC: 3.5 G/DL (ref 3.2–4.8)
ALBUMIN/GLOB SERPL: 1.3 {RATIO} (ref 1–2)
ALP LIVER SERPL-CCNC: 70 U/L
ALT SERPL-CCNC: 30 U/L
ANION GAP SERPL CALC-SCNC: 6 MMOL/L (ref 0–18)
AST SERPL-CCNC: 39 U/L (ref ?–34)
BILIRUB SERPL-MCNC: 0.3 MG/DL (ref 0.2–0.9)
BUN BLD-MCNC: 18 MG/DL (ref 9–23)
BUN/CREAT SERPL: 19.6 (ref 10–20)
CALCIUM BLD-MCNC: 9.3 MG/DL (ref 8.7–10.4)
CHLORIDE SERPL-SCNC: 104 MMOL/L (ref 98–112)
CO2 SERPL-SCNC: 30 MMOL/L (ref 21–32)
CREAT BLD-MCNC: 0.92 MG/DL
DEPRECATED RDW RBC AUTO: 47.8 FL (ref 35.1–46.3)
EGFRCR SERPLBLD CKD-EPI 2021: 57 ML/MIN/1.73M2 (ref 60–?)
ERYTHROCYTE [DISTWIDTH] IN BLOOD BY AUTOMATED COUNT: 15.3 % (ref 11–15)
GLOBULIN PLAS-MCNC: 2.7 G/DL (ref 2–3.5)
GLUCOSE BLD-MCNC: 112 MG/DL (ref 70–99)
HCT VFR BLD AUTO: 34.1 %
HGB BLD-MCNC: 11.2 G/DL
MAGNESIUM SERPL-MCNC: 1.9 MG/DL (ref 1.6–2.6)
MCH RBC QN AUTO: 27.8 PG (ref 26–34)
MCHC RBC AUTO-ENTMCNC: 32.8 G/DL (ref 31–37)
MCV RBC AUTO: 84.6 FL
OSMOLALITY SERPL CALC.SUM OF ELEC: 293 MOSM/KG (ref 275–295)
PLATELET # BLD AUTO: 473 10(3)UL (ref 150–450)
POTASSIUM SERPL-SCNC: 4.3 MMOL/L (ref 3.5–5.1)
PROT SERPL-MCNC: 6.2 G/DL (ref 5.7–8.2)
RBC # BLD AUTO: 4.03 X10(6)UL
SODIUM SERPL-SCNC: 140 MMOL/L (ref 136–145)
WBC # BLD AUTO: 9.8 X10(3) UL (ref 4–11)

## 2024-11-10 PROCEDURE — 80053 COMPREHEN METABOLIC PANEL: CPT | Performed by: STUDENT IN AN ORGANIZED HEALTH CARE EDUCATION/TRAINING PROGRAM

## 2024-11-10 PROCEDURE — 85027 COMPLETE CBC AUTOMATED: CPT | Performed by: STUDENT IN AN ORGANIZED HEALTH CARE EDUCATION/TRAINING PROGRAM

## 2024-11-10 PROCEDURE — 83735 ASSAY OF MAGNESIUM: CPT | Performed by: HOSPITALIST

## 2024-11-10 RX ORDER — FUROSEMIDE 20 MG/1
20 TABLET ORAL DAILY
Qty: 30 TABLET | Refills: 0 | Status: SHIPPED | OUTPATIENT
Start: 2024-11-11 | End: 2024-12-11

## 2024-11-10 RX ORDER — AMLODIPINE BESYLATE 5 MG/1
5 TABLET ORAL NIGHTLY
Qty: 30 TABLET | Refills: 3 | Status: SHIPPED | OUTPATIENT
Start: 2024-11-10 | End: 2025-03-10

## 2024-11-10 NOTE — PLAN OF CARE
Plan: back to [place] , independent living.     Problem: Patient Centered Care  Goal: Patient preferences are identified and integrated in the patient's plan of care  Description: Interventions:  - What would you like us to know as we care for you? Lives at [facility].  - Provide timely, complete, and accurate information to patient/family  - Incorporate patient and family knowledge, values, beliefs, and cultural backgrounds into the planning and delivery of care  - Encourage patient/family to participate in care and decision-making at the level they choose  - Honor patient and family perspectives and choices  Outcome: Progressing     Problem: CARDIOVASCULAR - ADULT  Goal: Maintains optimal cardiac output and hemodynamic stability  Description: INTERVENTIONS:  - Monitor vital signs, rhythm, and trends  - Monitor for bleeding, hypotension and signs of decreased cardiac output  - Evaluate effectiveness of vasoactive medications to optimize hemodynamic stability  - Monitor arterial and/or venous puncture sites for bleeding and/or hematoma  - Assess quality of pulses, skin color and temperature  - Assess for signs of decreased coronary artery perfusion - ex. Angina  - Evaluate fluid balance, assess for edema, trend weights  Outcome: Progressing  Goal: Absence of cardiac arrhythmias or at baseline  Description: INTERVENTIONS:  - Continuous cardiac monitoring, monitor vital signs, obtain 12 lead EKG if indicated  - Evaluate effectiveness of antiarrhythmic and heart rate control medications as ordered  - Initiate emergency measures for life threatening arrhythmias  - Monitor electrolytes and administer replacement therapy as ordered  Outcome: Progressing     Problem: RISK FOR INFECTION - ADULT  Goal: Absence of fever/infection during anticipated neutropenic period  Description: INTERVENTIONS  - Monitor WBC  - Administer growth factors as ordered  - Implement neutropenic guidelines  Outcome: Progressing     Problem:  SAFETY ADULT - FALL  Goal: Free from fall injury  Description: INTERVENTIONS:  - Assess pt frequently for physical needs  - Identify cognitive and physical deficits and behaviors that affect risk of falls.  - [place] fall precautions as indicated by assessment.  - Educate pt/family on patient safety including physical limitations  - Instruct pt to call for assistance with activity based on assessment  - Modify environment to reduce risk of injury  - Provide assistive devices as appropriate  - Consider OT/PT consult to assist with strengthening/mobility  - Encourage toileting schedule  Outcome: Progressing

## 2024-11-10 NOTE — PROGRESS NOTES
Patient and family provided with discharge instructions. All questions answered. IV and Tele box removed. Wheeled patient down along with family.

## 2024-11-10 NOTE — PROGRESS NOTES
[facility]  Cardiology Progress Note    Marilu Dickens Patient Status:  Inpatient    1927 MRN [id number]   Location [facility] 3W/SW Attending Abigail Avina MD   Hosp Day # 4 PCP ADONIS QUIROZ MD, MD       Impression:  Shortness of breath 2/2 acute HFpEF  - Likely 2/2 hypertensive crisis  - Symptoms improved  Hypertension, improved since last night   Elevated troponin, not consistent with ACS. Likely Type II MI.  Hypokalemia: resolved      - Lasix 20mg PO. Renal function is stable   - Strict I/O, BMP, daily weight  - tele  - echo with low normal EF and no significant valvualr disease   - coreg 25mg BID>>back to metoprolol on DC given some low blood pressures over the past 24 hours. High risk of fall given age   - losartan 100mg qam>>on valsartan at home   - Amlodipine  5mg qpm>>continue on DC  - Continue ASA + statin + zetia  -Likely discharge today vs Monday     Subjective:   No acute issues overnight       Cardiac imaging:    TTE 10/7/24  1. Left ventricle: The cavity size was normal. Wall thickness was mildly      increased. Systolic function was at the lower limits of normal. The      estimated ejection fraction was 50-55%, by biplane method of disks. Left      ventricular diastolic function parameters were normal.   2. Left atrium: The atrium was mildly enlarged.     Patient Active Problem List   Diagnosis    CAD (coronary artery disease)    Carotid artery disease ([facility])    Dyslipidemia    Renal artery stenosis ([facility])    S/P CABG (coronary artery bypass graft)    Essential hypertension    Syncope and collapse    Hyponatremia    Leukocytosis    Seizure ([facility])    Acute exacerbation of CHF (congestive heart failure) ([facility])    Acute on chronic congestive heart failure, unspecified heart failure type ([facility])    Acute MI ([facility])       Objective:   Temp: 98.7 °F (37.1 °C)  Pulse: 76  Resp: 16  BP: 109/56    Intake/Output:     Intake/Output Summary (Last 24 hours) at 11/10/2024  0851  Last data filed at 11/10/2024 0600  Gross per 24 hour   Intake 695 ml   Output --   Net 695 ml       Last 3 Weights   11/10/24 0547 128 lb 6.4 oz (58.2 kg)   11/09/24 0341 131 lb 12.8 oz (59.8 kg)   11/08/24 0442 130 lb 8 oz (59.2 kg)   11/07/24 0150 134 lb (60.8 kg)   11/06/24 2143 134 lb (60.8 kg)   11/06/24 1845 130 lb (59 kg)   08/19/23 1923 138 lb 0.1 oz (62.6 kg)   08/09/22 0941 138 lb (62.6 kg)       Tele: NSR    Physical Exam:    General: Alert and oriented x 3. No apparent distress. No respiratory or constitutional distress.  HEENT: Normocephalic, anicteric sclera, neck supple, no thyromegaly or adenopathy.  Neck: No JVD, carotids 2+, no bruits.  Cardiac: Regular rate and rhythm. S1, S2 normal. No murmur, pericardial rub, S3, thrill, heave or extra cardiac sounds.  Lungs: Clear without wheezes, rales, rhonchi or dullness.  Normal excursions and effort.  Abdomen: Soft, non-tender. No organosplenomegally, mass or rebound, BS-present.  Extremities: Without clubbing, cyanosis or edema.  Peripheral pulses are 2+.  Neurologic: Alert and oriented, normal affect. No motor or coordinational deficit.  Skin: Warm and dry.     Laboratory/Data:    Labs:         Recent Labs   Lab 11/06/24 1911 11/07/24  0609 11/08/24  0607 11/09/24  0710 11/10/24  0717   WBC 16.4* 12.6* 9.2 8.6 9.8   HGB 11.7* 10.6* 10.7* 10.2* 11.2*   MCV 84.7 83.2 89.8 83.2 84.6   .0* 431.0 427.0 426.0 473.0*       Recent Labs   Lab 11/06/24 1911 11/07/24  0609 11/07/24  1549 11/08/24  0607 11/09/24  0710 11/10/24  0717    138  --  136 139 140   K 3.8 2.9* 3.9 3.8 4.2  4.2 4.3    102  --  105 106 104   CO2 29.0 29.0  --  25.0 27.0 30.0   BUN 19 16  --  15 19 18   CREATSERUM 1.02 0.87  --  0.83 0.85 0.92   CA 9.0 8.4*  --  8.5* 8.6* 9.3   MG  --  1.9  --   --  1.9 1.9   * 107*  --  109* 99 112*       Recent Labs   Lab 11/07/24  0609 11/08/24  0607 11/09/24  0710 11/10/24  0717   ALT 16 19 25 30   AST 28 31 37* 39*    ALB 3.1* 3.0* 3.0* 3.5       No results for input(s): \"TROP\" in the last 168 hours.    Allergies:   Allergies[1]    Medications:  Current Facility-Administered Medications   Medication Dose Route Frequency    furosemide (Lasix) tab 20 mg  20 mg Oral Daily    losartan (Cozaar) tab 100 mg  100 mg Oral Daily    carvedilol (Coreg) tab 25 mg  25 mg Oral BID with meals    amLODIPine (Norvasc) tab 5 mg  5 mg Oral Nightly    ondansetron (Zofran) 4 MG/2ML injection 4 mg  4 mg Intravenous Q6H PRN    labetalol (Trandate) 5 mg/mL injection 10 mg  10 mg Intravenous Q6H PRN    hydrALAzine (Apresoline) 20 mg/mL injection 10 mg  10 mg Intravenous Q4H PRN    aspirin chewable tab 81 mg  81 mg Oral Daily    ezetimibe (Zetia) tab 10 mg  10 mg Oral Daily    atorvastatin (Lipitor) tab 20 mg  20 mg Oral Daily    acetaminophen (Tylenol Extra Strength) tab 500 mg  500 mg Oral Q4H PRN    heparin (Porcine) 5000 UNIT/ML injection 5,000 Units  5,000 Units Subcutaneous Q8H [facility]       Vincent Hampton MD           [1]   Allergies  Allergen Reactions    Sulfa Antibiotics

## 2024-11-10 NOTE — PLAN OF CARE
Patient is alert and oriented times 4. [place], wears bilateral hearing aids. On RA. No complains of pan at this time. Plan to possibly discharge patient today back to [facility].     Problem: Patient Centered Care  Goal: Patient preferences are identified and integrated in the patient's plan of care  Description: Interventions:  - Provide timely, complete, and accurate information to patient/family  - Incorporate patient and family knowledge, values, beliefs, and cultural backgrounds into the planning and delivery of care  - Encourage patient/family to participate in care and decision-making at the level they choose  - Honor patient and family perspectives and choices  Outcome: Progressing     Problem: Patient/Family Goals  Goal: Patient/Family Short Term Goal  Description: Patient's Short Term Goal:    Interventions:     - See additional Care Plan goals for specific interventions  Outcome: Progressing     Problem: CARDIOVASCULAR - ADULT  Goal: Maintains optimal cardiac output and hemodynamic stability  Description: INTERVENTIONS:  - Monitor vital signs, rhythm, and trends  - Monitor for bleeding, hypotension and signs of decreased cardiac output  - Evaluate effectiveness of vasoactive medications to optimize hemodynamic stability  - Monitor arterial and/or venous puncture sites for bleeding and/or hematoma  - Assess quality of pulses, skin color and temperature  - Assess for signs of decreased coronary artery perfusion - ex. Angina  - Evaluate fluid balance, assess for edema, trend weights  Outcome: Progressing

## 2024-11-10 NOTE — DISCHARGE SUMMARY
[facility]  part of [facility] Internal Medicine Discharge Summary   Patient ID:  Marilu Dickens  [id number]  89 year old  11/2/1927    Admit date: 11/6/2024    Discharge date and time: 11/10/24    Attending Physician: Abigail Avina MD     Primary Care Physician: ADONIS QUIROZ MD, MD     Reason for admission acute on chronic dialstolic HF  (see HPI on HP for further detail)    Discharged Condition: stable    Disposition: home    Risk of Readmission Lace+ Score: 65  59-90 High Risk  29-58 Medium Risk  0-28   Low Risk    Important follow up:  -pcp in 1 week  -cards on 1/18/24  Discharge meds  I reviewed and reconciled current and discharge medications on the day of discharge with the changes reflected below.       Medication List        START taking these medications      amLODIPine 5 MG Tabs  Commonly known as: Norvasc  Take 1 tablet (5 mg total) by mouth at bedtime.     furosemide 20 MG Tabs  Commonly known as: Lasix  Take 1 tablet (20 mg total) by mouth daily.  Start taking on: November 11, 2024            CONTINUE taking these medications      aspirin 81 MG Chew     ezetimibe 10 MG Tabs  Commonly known as: Zetia  TAKE 1 TABLET(10 MG) BY MOUTH EVERY DAY     Irbesartan 300 MG Tabs  Take 1 tablet (300 mg total) by mouth daily.     metoprolol succinate  MG Tb24  Commonly known as: Toprol XL  TAKE 1 TABLET(100 MG) BY MOUTH EVERY DAY     Pravastatin Sodium 80 MG Tabs  Commonly known as: PRAVACHOL  TAKE 1 TABLET BY MOUTH EVERY EVENING               Where to Get Your Medications        These medications were sent to [id number] - [place], [place] - [address] AT [facility] [address], [phone], [phone] [address], [place] [address]      Phone: [phone]   amLODIPine 5 MG Tabs  furosemide 20 MG Tabs       HPI per chart  Marilu Dickens is a 89 year old female with PMH sig for CAD post CABG in 1991, hyperlipidemia, hypertension, renal  artery stenosis and recent admission to outside hospital with GI bleed with discharge hemoglobin of 8.5 who presented from her apartment at [place] with shortness of breath over the last 3 weeks.  Blood pressure is chronically elevated.  Troponin on admission was 67 with repeat relatively flat in the 70s.  Chest x-ray with bilateral pulmonary congestion.  proBNP 3338.       Patient denies any chest pain.  No hypoxia.  Pretty independent at baseline.  Hemoglobin is 10.6 here.  Discharge hemoglobin during prior admission was 8.5 at outside hospital.  Cardiology and pulmonology on consult.  Hospital Course  Marilu Dickens is a 89 year old female with PMH sig for CAD post CABG in 1991, hyperlipidemia, hypertension, renal artery stenosis and recent admission to outside hospital with GI bleed with discharge hemoglobin of 8.5 who presented from her apartment at [place] with shortness of breath over the last 3 weeks.  Blood pressure is chronically elevated.  Troponin on admission was 67 with repeat relatively flat in the 70s.  Chest x-ray with bilateral pulmonary congestion.  proBNP 3338.  Blood pressure very labile     Initially up t 190's.  Then down to 100's.  Pt tolereated starting norvasc 5mg.  No LH or dizziness. Cleared for dc per cards    Discharge Diagnoses:   Acute on chronic congestive heart failure  - Follow-up echo, continue IV diuresis, monitor clinical response, renal function and lites->now on oral diuretic   -Daily weights and I's and O's  -dc on po lasix 20 daily      CAD status post CABG in 1991, hypertension poorly controlled with known renal artery stenosis, hyperlipidemia  - Patient states is hard time controlling blood pressure despite medications, given age allowed to drift higher  -Continue losartan, metoprolol->increase losartan to 100, titrate up as needed  - As needed hydralazine ordered  - norvasc added per cards, monitor, rec to continue at home      Hypokalemia   - Per  protocol    Consults: IP CONSULT TO CARDIOLOGY  IP CONSULT TO PULMONOLOGY    Radiology: CARD ECHO 2D DOPPLER (CPT=93306)    Result Date: 2024  Transthoracic Echocardiogram Name:Marilu Dikcens Date: 2024 :  1927 Ht:  (61in)  BP: 172 / 64 MRN:  [id number]    Age:  89years    Wt:  (134lb) HR: 72bpm Loc:  [facility]       Gndr: F          BSA: 1.59m^2 Sonographer: Leatha DELGADO Ordering:    Dg Smith Consulting:  Rona Baldwin ---------------------------------------------------------------------------- History/Indications:   Heart Failure. ---------------------------------------------------------------------------- Procedure information:  A transthoracic complete 2D study was performed. Additional evaluation included M-mode, complete spectral Doppler, and color Doppler.  Patient status:  Inpatient.  Location:  Bedside.    This was a routine study. Transthoracic echocardiography for diagnosis. Image quality was adequate. ECG rhythm:   Normal sinus ---------------------------------------------------------------------------- Conclusions: 1. Left ventricle: The cavity size was normal. Wall thickness was mildly    increased. Systolic function was at the lower limits of normal. The    estimated ejection fraction was 50-55%, by biplane method of disks. Left    ventricular diastolic function parameters were normal. 2. Left atrium: The atrium was mildly enlarged. * ---------------------------------------------------------------------------- * Findings: Left ventricle:  The cavity size was normal. Wall thickness was mildly increased. Systolic function was at the lower limits of normal. The estimated ejection fraction was 50-55%, by biplane method of disks. No diagnostic evidence for diffuse regional wall motion abnormalities. Left ventricular diastolic function parameters were normal. Left atrium:  Well visualized. The atrium was mildly enlarged. Right ventricle:  The cavity size was at the upper limits of normal. Systolic  function was normal. Right atrium:  Well visualized. The atrium was normal in size. Mitral valve:  Well visualized. The valve was structurally normal. The leaflets were normal thickness. Leaflet separation was normal. No evidence for prolapse.  Doppler:  Transvalvular velocity was within the normal range. There was no evidence for stenosis. There was trivial regurgitation. Aortic valve:  Well visualized.  The valve was trileaflet. The leaflets were normal thickness and mildly calcified. Cusp separation was normal.  Doppler:  Transvalvular velocity was within the normal range. There was no evidence for stenosis. There was no significant regurgitation.    The peak systolic gradient was 8mm Hg. Tricuspid valve:  The valve is structurally normal. Leaflet separation was normal.  Doppler:  Transvalvular velocity was within the normal range. There was no evidence for stenosis. There was no significant regurgitation. Pulmonic valve:   Well visualized. The annulus is normal-sized. The valve is structurally normal. The leaflets are normal thickness. Cusp separation was normal. No evidence for prolapse.  Doppler:  Transvalvular velocity was within the normal range. There was no evidence for stenosis. There was mild regurgitation. Pericardium:   There was no pericardial effusion. Pleura:  No evidence of pleural fluid accumulation. Aorta: Aortic root: The aortic root was normal-sized. The aortic root was normal. Ascending aorta: The ascending aorta was normal. The ascending aorta was normal. Pulmonary arteries: The main pulmonary artery was normal-sized. There was no evidence of pulmonary hypertension. Systemic veins:  Central venous respirophasic diameter changes are in the normal range (>50%). Inferior vena cava: The IVC was normally collapsible and normal-sized. The IVC was normal-sized. ---------------------------------------------------------------------------- Measurements  Left ventricle         Value        Ref        09/12/2017  IVS thickness, ED, (H) 1.5   cm     0.6 - 0.9 0.9  PLAX  LV ID, ED, PLAX        4.0   cm     3.8 - 5.2 5.4  LV ID, ES, PLAX        3.2   cm     2.2 - 3.5 4.3  LV PW thickness,   (H) 1.1   cm     0.6 - 0.9 0.8  ED, PLAX  IVS/LV PW ratio,       1.36         --------- 1.12  ED, PLAX  LV PW/LV ID ratio,     0.28         --------- ----------  ED, PLAX  LV ejection        (L) 41    %      54 - 74   40  fraction  Stroke volume/bsa,     39    ml/m^2 --------- ----------  2D  LV end-diastolic       58    ml     48 - 140  ----------  volume, 1-p A4C  LV ejection        (L) 44    %      46 - 78   ----------  fraction, 1-p A4C  Stroke volume, 1-p     25    ml     --------- ----------  A4C  LV end-diastolic       36    ml/m^2 30 - 82   ----------  volume/bsa, 1-p  A4C  Stroke volume/bsa,     16    ml/m^2 --------- ----------  1-p A4C  LV end-diastolic       65    ml     46 - 106  ----------  volume, 2-p  LV end-systolic        31    ml     14 - 42   ----------  volume, 2-p  LV ejection        (L) 51    %      54 - 74   ----------  fraction, 2-p  Stroke volume, 2-p     33    ml     --------- ----------  LV end-diastolic       41    ml/m^2 29 - 61   ----------  volume/bsa, 2-p  LV end-systolic        20    ml/m^2 8 - 24    ----------  volume/bsa, 2-p  Stroke volume/bsa,     20.8  ml/m^2 --------- ----------  2-p  LV e', lateral         10.1  cm/sec >=10.0    7.2  LV E/e', lateral       11           <=13      ----------  LV e', medial      (L) 6.9   cm/sec >=7.0     6.1  LV E/e', medial        16           --------- ----------  LV e', average         8.5   cm/sec --------- ----------  LV E/e', average       13           <=14      ----------  LVOT                   Value        Ref       09/12/2017  LVOT ID                1.9   cm     --------- ----------  LVOT peak              0.94  m/sec  --------- ----------  velocity, S  LVOT VTI, S            21.9  cm     --------- ----------  LVOT peak              4     mm Hg   --------- ----------  gradient, S  LVOT mean              2     mm Hg  --------- ----------  gradient, S  Stroke volume          62    ml     --------- ----------  (SV), LVOT DP  Stroke index           39    ml/m^2 --------- ----------  (SV/bsa), LVOT DP  Aortic valve           Value        Ref       09/12/2017  Aortic leaflet         1.4   cm     --------- ----------  separation, MM  Aortic valve peak      1.45  m/sec  --------- ----------  velocity, S  Aortic peak            8     mm Hg  --------- ----------  gradient, S  Velocity ratio,        0.65         --------- ----------  peak, LVOT/AV  Aortic root            Value        Ref       09/12/2017  Aortic root ID,        2.0   cm     2.0 - 3.2 ----------  STJ, ED  Ascending aorta        Value        Ref       09/12/2017  Ascending aorta ID     3.1   cm     1.9 - 3.5 ----------  Left atrium            Value        Ref       09/12/2017  LA volume, S           50    ml     22 - 52   52  LA volume/bsa, S       31    ml/m^2 16 - 34   31  LA volume, ES, 1-p     43    ml     22 - 52   48  A4C  LA volume, ES, 1-p     49    ml     22 - 52   56  A2C  LA volume, ES, A/L     52    ml     --------- ----------  LA volume/bsa, ES,     33    ml/m^2 16 - 34   ----------  A/L  Mitral valve           Value        Ref       09/12/2017  Mitral E-wave peak     1.08  m/sec  --------- 0.99  velocity  Mitral A-wave peak     1.35  m/sec  --------- 1.27  velocity  Mitral                 183   ms     --------- ----------  deceleration time  Mitral peak            5     mm Hg  --------- 4  gradient, D  Mitral E/A ratio,      0.8          --------- ----------  peak  Pulmonary artery       Value        Ref       09/12/2017  PA pressure, ED,       8     mm Hg  --------- 9  DP  Systemic veins         Value        Ref       09/12/2017  Estimated CVP          3     mm Hg  --------- 5  Inferior vena cava     Value        Ref       09/12/2017  ID                     1.7   cm     <=2.1      ----------  Right ventricle        Value        Ref       09/12/2017  TAPSE, 2D              1.97  cm     >=1.70    ----------  TAPSE, MM              1.97  cm     >=1.70    ----------  RV s', lateral         9.8   cm/sec >=9.5     ----------  Pulmonic valve         Value        Ref       09/12/2017  Pulmonic regurg        1.4   m/sec  --------- ----------  peak velocity  Pulmonic regurg        8     mm Hg  --------- ----------  peak gradient  Pulmonic regurg        1.13  m/sec  --------- 0.95  velocity, ED  Pulmonic regurg        5     mm Hg  --------- ----------  gradient, ED Legend: (L)  and  (H)  renée values outside specified reference range. ---------------------------------------------------------------------------- Prepared and electronically signed by Eliseo Vázquez 11/07/2024 12:21     XR CHEST AP PORTABLE  (CPT=71045)    Result Date: 11/6/2024  PROCEDURE: XR CHEST AP PORTABLE  (CPT=71045) TIME: 1927 hours  COMPARISON: [facility], XR RIBS WITH CHEST (3 VIEWS), RIGHT (CPT=71101), 8/19/2023, 7:59 PM.  INDICATIONS: Difficulty in breathing today.  TECHNIQUE:   Single view.   FINDINGS:  CARDIAC/VASC: Post coronary artery bypass. Cardiomegaly. Pulmonary venous congestion. Atherosclerotic calcification aorta. MEDIAST/REMBERTO:   No visible mass or adenopathy. LUNGS/PLEURA: No consolidation. BONES: Post sternotomy. OTHER: Negative.          CONCLUSION:  1. Mild CHF.    Dictated by (CST): Gerardo Cleaning MD on 11/06/2024 at 7:40 PM     Finalized by (CST): Gerardo Cleaning MD on 11/06/2024 at 7:42 PM             Operative Procedures:      Day of discharge feels well today, no LH, no dizziness, feels bored.      Exam  Vitals:    11/10/24 0842   BP: 109/56   Pulse: 76   Resp: 16   Temp: 98.7 °F (37.1 °C)     No acute distress, alert and orientedx3  Lungs Clear  Heart Regular  Abdomen Benign    Total Time Coordinating Care: > than 30 minutes  Note: This chart was prepared using voice recognition software and  may contain unintended word substitution errors.     Patient had opportunity to ask questions and state understand and agree with therapeutic plan as outlined

## 2024-11-11 NOTE — CDS QUERY
Dear Dr Avina,  Can hypertensive crisis be further specified?  ( ) hypertensive urgency  ( ) hypertensive emergency  () other - please specify:_______________________________      Clinical indicators  Blood pressure:  215/85 208/99 204 /73   Troponin Trop 67 67 78,     Cardiology notes: Shortness of breath 2/2 acute HFpEF - Likely 2/2 hypertensive crisis  Elevated troponin, not consistent with ACS. Likely Type II MI.    Risk factors: 89yr M with HTN, CAD-CABG, Poorly controlled Hypertension with Renal artery stenosis, Hyperlipidemia    Treatment:  IV lasix 40mg , losartan, hydralazine PRN,  switch metoprolol to coreg, Norvasac            If you have any questions, please contact Clinical Documentation  Specialist:  BELLE Brandon [phone]      Thank You!     THIS FORM IS A PERMANENT PART OF THE MEDICAL RECORD

## 2024-11-11 NOTE — CDS QUERY
Dear Dr Avina,  Can status of likely Type II MI be clarified?  ( ) Type II MI is valid - specify etiology: _____________________________  ( ) ruled out, demand ischemia   ( ) other - please specify:___________________________      Clinical indicators:  Progressive  Troponin Trop 67 67 78,  BNP 3338  Blood pressure:  215/85 208/99 204 /73   EKG: change in initial forces of Anterior leads  ECHO: EF 50-55% . No evidence for diffuse regional wall motion abnormalities    Cardiology notes: Shortness of breath 2/2 acute HFpEF - Likely 2/2 hypertensive crisis  Elevated troponin, not consistent with ACS. Likely Type II MI.    Risk factors: 89yr M with HTN, CAD-CABG, Poorly controlled Hypertension with Renal artery stenosis, Hyperlipidemia    Treatment:  IV lasix 40mg , losartan, hydralazine PRN,  switch metoprolol to coreg, Norvasac       Use of terms such as suspected, possible, or probable (associated with a specific diagnosis that is being evaluated, monitored, or treated as if it exists) are acceptable and can be coded in the inpatient setting, when documented at the time of discharge.     Please add any additional documentation to your progress note and continue to document this through discharge.  If you have any questions, please contact Clinical :  BELLE Brandon at      Thank You!    THIS FORM IS A PERMANENT PART OF THE MEDICAL RECORD

## 2024-11-21 PROBLEM — K92.2 GI BLEED: Status: RESOLVED | Noted: 2024-08-13 | Resolved: 2024-11-21

## 2024-11-22 PROBLEM — I50.20 SYSTOLIC CONGESTIVE HEART FAILURE  (CMD): Status: ACTIVE | Noted: 2024-11-22

## 2025-02-26 ENCOUNTER — APPOINTMENT (OUTPATIENT)
Dept: GENERAL RADIOLOGY | Facility: HOSPITAL | Age: OVER 89
End: 2025-02-26
Attending: EMERGENCY MEDICINE
Payer: MEDICARE

## 2025-02-26 ENCOUNTER — HOSPITAL ENCOUNTER (INPATIENT)
Facility: HOSPITAL | Age: OVER 89
LOS: 3 days | Discharge: HOME | End: 2025-03-03
Attending: EMERGENCY MEDICINE | Admitting: INTERNAL MEDICINE
Payer: MEDICARE

## 2025-02-26 ENCOUNTER — HOSPITAL ENCOUNTER (INPATIENT)
Facility: HOSPITAL | Age: OVER 89
LOS: 3 days | Discharge: HOME HEALTH | End: 2025-03-03
Attending: EMERGENCY MEDICINE | Admitting: INTERNAL MEDICINE
Payer: MEDICARE

## 2025-02-26 DIAGNOSIS — J10.1 INFLUENZA A: Primary | ICD-10-CM

## 2025-02-26 DIAGNOSIS — I50.9 ACUTE ON CHRONIC CONGESTIVE HEART FAILURE, UNSPECIFIED HEART FAILURE TYPE (HCC): ICD-10-CM

## 2025-02-26 PROBLEM — R73.9 HYPERGLYCEMIA: Status: ACTIVE | Noted: 2025-02-26

## 2025-02-26 LAB
ANION GAP SERPL CALC-SCNC: 9 MMOL/L (ref 0–18)
BASOPHILS # BLD: 0 X10(3) UL (ref 0–0.2)
BASOPHILS NFR BLD: 0 %
BNP SERPL-MCNC: 191 PG/ML (ref ?–100)
BUN BLD-MCNC: 12 MG/DL (ref 9–23)
BUN/CREAT SERPL: 11.2 (ref 10–20)
CALCIUM BLD-MCNC: 9 MG/DL (ref 8.7–10.4)
CHLORIDE SERPL-SCNC: 101 MMOL/L (ref 98–112)
CO2 SERPL-SCNC: 23 MMOL/L (ref 21–32)
CREAT BLD-MCNC: 1.07 MG/DL
DEPRECATED RDW RBC AUTO: 51 FL (ref 35.1–46.3)
EGFRCR SERPLBLD CKD-EPI 2021: 47 ML/MIN/1.73M2 (ref 60–?)
EOSINOPHIL # BLD: 0.19 X10(3) UL (ref 0–0.7)
EOSINOPHIL NFR BLD: 1 %
ERYTHROCYTE [DISTWIDTH] IN BLOOD BY AUTOMATED COUNT: 15.9 % (ref 11–15)
FLUAV + FLUBV RNA SPEC NAA+PROBE: NEGATIVE
FLUAV + FLUBV RNA SPEC NAA+PROBE: POSITIVE
GLUCOSE BLD-MCNC: 165 MG/DL (ref 70–99)
HCT VFR BLD AUTO: 38.6 %
HGB BLD-MCNC: 12.6 G/DL
LYMPHOCYTES NFR BLD: 11 %
LYMPHOCYTES NFR BLD: 2.05 X10(3) UL (ref 1–4)
MCH RBC QN AUTO: 28.5 PG (ref 26–34)
MCHC RBC AUTO-ENTMCNC: 32.6 G/DL (ref 31–37)
MCV RBC AUTO: 87.3 FL
METAMYELOCYTES # BLD: 0.19 X10(3) UL
METAMYELOCYTES NFR BLD: 1 %
MONOCYTES # BLD: 1.86 X10(3) UL (ref 0.1–1)
MONOCYTES NFR BLD: 10 %
NEUTROPHILS # BLD AUTO: 13.95 X10 (3) UL (ref 1.5–7.7)
NEUTROPHILS NFR BLD: 77 %
NEUTS HYPERSEG # BLD: 14.32 X10(3) UL (ref 1.5–7.7)
OSMOLALITY SERPL CALC.SUM OF ELEC: 279 MOSM/KG (ref 275–295)
PLATELET # BLD AUTO: 628 10(3)UL (ref 150–450)
PLATELET MORPHOLOGY: NORMAL
POTASSIUM SERPL-SCNC: 3.9 MMOL/L (ref 3.5–5.1)
RBC # BLD AUTO: 4.42 X10(6)UL
RSV RNA SPEC NAA+PROBE: NEGATIVE
SARS-COV-2 RNA RESP QL NAA+PROBE: NOT DETECTED
SODIUM SERPL-SCNC: 133 MMOL/L (ref 136–145)
TOTAL CELLS COUNTED BLD: 100
TROPONIN I SERPL HS-MCNC: 9 NG/L
WBC # BLD AUTO: 18.6 X10(3) UL (ref 4–11)

## 2025-02-26 PROCEDURE — 85007 BL SMEAR W/DIFF WBC COUNT: CPT | Performed by: EMERGENCY MEDICINE

## 2025-02-26 PROCEDURE — 93005 ELECTROCARDIOGRAM TRACING: CPT

## 2025-02-26 PROCEDURE — 96374 THER/PROPH/DIAG INJ IV PUSH: CPT

## 2025-02-26 PROCEDURE — 93010 ELECTROCARDIOGRAM REPORT: CPT

## 2025-02-26 PROCEDURE — 80048 BASIC METABOLIC PNL TOTAL CA: CPT | Performed by: EMERGENCY MEDICINE

## 2025-02-26 PROCEDURE — 84484 ASSAY OF TROPONIN QUANT: CPT | Performed by: EMERGENCY MEDICINE

## 2025-02-26 PROCEDURE — 85025 COMPLETE CBC W/AUTO DIFF WBC: CPT | Performed by: EMERGENCY MEDICINE

## 2025-02-26 PROCEDURE — 99285 EMERGENCY DEPT VISIT HI MDM: CPT

## 2025-02-26 PROCEDURE — 0241U SARS-COV-2/FLU A AND B/RSV BY PCR (GENEXPERT): CPT | Performed by: EMERGENCY MEDICINE

## 2025-02-26 PROCEDURE — 94640 AIRWAY INHALATION TREATMENT: CPT

## 2025-02-26 PROCEDURE — 83880 ASSAY OF NATRIURETIC PEPTIDE: CPT | Performed by: EMERGENCY MEDICINE

## 2025-02-26 PROCEDURE — 71045 X-RAY EXAM CHEST 1 VIEW: CPT | Performed by: EMERGENCY MEDICINE

## 2025-02-26 PROCEDURE — 85027 COMPLETE CBC AUTOMATED: CPT | Performed by: EMERGENCY MEDICINE

## 2025-02-26 RX ORDER — LOSARTAN POTASSIUM 100 MG/1
100 TABLET ORAL DAILY
Status: DISCONTINUED | OUTPATIENT
Start: 2025-02-27 | End: 2025-03-03

## 2025-02-26 RX ORDER — IRBESARTAN 300 MG/1
300 TABLET ORAL DAILY
COMMUNITY

## 2025-02-26 RX ORDER — METOPROLOL SUCCINATE 100 MG/1
100 TABLET, EXTENDED RELEASE ORAL DAILY
Status: DISCONTINUED | OUTPATIENT
Start: 2025-02-26 | End: 2025-03-03

## 2025-02-26 RX ORDER — ATORVASTATIN CALCIUM 20 MG/1
20 TABLET, FILM COATED ORAL NIGHTLY
Status: DISCONTINUED | OUTPATIENT
Start: 2025-02-26 | End: 2025-03-03

## 2025-02-26 RX ORDER — OSELTAMIVIR PHOSPHATE 75 MG/1
75 CAPSULE ORAL ONCE
Status: DISCONTINUED | OUTPATIENT
Start: 2025-02-26 | End: 2025-02-26

## 2025-02-26 RX ORDER — METOPROLOL SUCCINATE 100 MG/1
100 TABLET, EXTENDED RELEASE ORAL DAILY
COMMUNITY

## 2025-02-26 RX ORDER — EZETIMIBE 10 MG/1
10 TABLET ORAL DAILY
COMMUNITY

## 2025-02-26 RX ORDER — OSELTAMIVIR PHOSPHATE 30 MG/1
30 CAPSULE ORAL ONCE
Status: COMPLETED | OUTPATIENT
Start: 2025-02-26 | End: 2025-02-26

## 2025-02-26 RX ORDER — OSELTAMIVIR PHOSPHATE 30 MG/1
30 CAPSULE ORAL DAILY
Status: COMPLETED | OUTPATIENT
Start: 2025-02-27 | End: 2025-03-02

## 2025-02-26 RX ORDER — HYDRALAZINE HYDROCHLORIDE 25 MG/1
25 TABLET, FILM COATED ORAL DAILY
COMMUNITY

## 2025-02-26 RX ORDER — AMLODIPINE BESYLATE 5 MG/1
5 TABLET ORAL NIGHTLY
Status: DISCONTINUED | OUTPATIENT
Start: 2025-02-26 | End: 2025-03-03

## 2025-02-26 RX ORDER — FUROSEMIDE 10 MG/ML
40 INJECTION INTRAMUSCULAR; INTRAVENOUS ONCE
Status: COMPLETED | OUTPATIENT
Start: 2025-02-26 | End: 2025-02-26

## 2025-02-26 RX ORDER — IPRATROPIUM BROMIDE AND ALBUTEROL SULFATE 2.5; .5 MG/3ML; MG/3ML
3 SOLUTION RESPIRATORY (INHALATION) ONCE
Status: COMPLETED | OUTPATIENT
Start: 2025-02-26 | End: 2025-02-26

## 2025-02-26 RX ORDER — IPRATROPIUM BROMIDE AND ALBUTEROL SULFATE 2.5; .5 MG/3ML; MG/3ML
3 SOLUTION RESPIRATORY (INHALATION)
Status: DISCONTINUED | OUTPATIENT
Start: 2025-02-26 | End: 2025-03-03

## 2025-02-26 RX ORDER — FUROSEMIDE 20 MG/1
20 TABLET ORAL DAILY
Status: DISCONTINUED | OUTPATIENT
Start: 2025-02-26 | End: 2025-03-03

## 2025-02-26 RX ORDER — ENOXAPARIN SODIUM 100 MG/ML
30 INJECTION SUBCUTANEOUS EVERY 24 HOURS
Status: DISCONTINUED | OUTPATIENT
Start: 2025-02-26 | End: 2025-03-03

## 2025-02-26 RX ORDER — EZETIMIBE 10 MG/1
10 TABLET ORAL DAILY
Status: DISCONTINUED | OUTPATIENT
Start: 2025-02-26 | End: 2025-03-03

## 2025-02-26 RX ORDER — HYDRALAZINE HYDROCHLORIDE 25 MG/1
25 TABLET, FILM COATED ORAL DAILY
Status: DISCONTINUED | OUTPATIENT
Start: 2025-02-26 | End: 2025-03-03

## 2025-02-26 RX ORDER — PRAVASTATIN SODIUM 80 MG/1
80 TABLET ORAL NIGHTLY
COMMUNITY

## 2025-02-26 NOTE — ED PROVIDER NOTES
Patient Seen in: [facility] Emergency Department      History     Chief Complaint   Patient presents with    Cough/URI    Difficulty Breathing     Stated Complaint: TABATHA    Subjective:   HPI      Patient presents emergency department complaining of fever, chills, weakness, cough and congestion for the last 24 hours.  There is no chest pain.  There is no other aggravating or alleviating factors.    Objective:     Past Medical History:    Atherosclerosis of coronary artery    CAD (coronary artery disease)    Carotid artery disease    Dyslipidemia    Renal artery stenosis              Past Surgical History:   Procedure Laterality Date    Appendectomy      Breast surgery Right     Cabg  1991                Social History     Socioeconomic History    Marital status:    Tobacco Use    Smoking status: Never    Smokeless tobacco: Never   Substance and Sexual Activity    Alcohol use: Never    Drug use: Never   Other Topics Concern    Caffeine Concern Yes     Comment: 2 cups daily    Exercise Yes     Comment: walks     Social Drivers of Health     Food Insecurity: No Food Insecurity (11/7/2024)    Food Insecurity     Food Insecurity: Never true   Transportation Needs: No Transportation Needs (11/7/2024)    Transportation Needs     Lack of Transportation: No   Recent Concern: Transportation Needs - At Risk (8/14/2024)    Received from [facility]    Transportation Needs     In the past 12 months, has lack of reliable transportation kept you from medical appointments, meetings, work or from getting things needed for daily living? : Yes   Housing Stability: Low Risk  (11/7/2024)    Housing Stability     Housing Instability: No                  Physical Exam     ED Triage Vitals [02/26/25 1627]   BP (!) 157/122   Pulse 108   Resp 22   Temp (!) 100.9 °F (38.3 °C)   Temp src Oral   SpO2 95 %   O2 Device None (Room air)       Current Vitals:   Vital Signs  BP: 157/59  Pulse: 97  Resp: 21  Temp: 99.6 °F  (37.6 °C)  Temp src: Oral  MAP (mmHg): 88    Oxygen Therapy  SpO2: 91 %  O2 Device: None (Room air)        Physical Exam  Vitals and nursing note reviewed.   Constitutional:       General: She is not in acute distress.     Appearance: She is well-developed.   HENT:      Head: Normocephalic.      Nose: Nose normal.      Mouth/Throat:      Mouth: Mucous membranes are moist.   Eyes:      Conjunctiva/sclera: Conjunctivae normal.   Cardiovascular:      Rate and Rhythm: Normal rate and regular rhythm.      Heart sounds: No murmur heard.  Pulmonary:      Effort: Pulmonary effort is normal. No respiratory distress.      Breath sounds: Wheezing present.   Abdominal:      General: There is no distension.      Palpations: Abdomen is soft.      Tenderness: There is no abdominal tenderness.   Musculoskeletal:         General: No tenderness. Normal range of motion.      Cervical back: Normal range of motion and neck supple.   Skin:     General: Skin is warm and dry.      Capillary Refill: Capillary refill takes less than 2 seconds.      Findings: No rash.   Neurological:      General: No focal deficit present.      Mental Status: She is alert and oriented to person, place, and time.      Cranial Nerves: No cranial nerve deficit.      Motor: No weakness.             ED Course     Labs Reviewed   CBC WITH DIFFERENTIAL WITH PLATELET - Abnormal; Notable for the following components:       Result Value    WBC 18.6 (*)     RDW-SD 51.0 (*)     RDW 15.9 (*)     .0 (*)     Neutrophil Absolute Prelim 13.95 (*)     All other components within normal limits   BASIC METABOLIC PANEL (8) - Abnormal; Notable for the following components:    Glucose 165 (*)     Sodium 133 (*)     Creatinine 1.07 (*)     eGFR-Cr 47 (*)     All other components within normal limits   BNP (B TYPE NATRIURETIC PEPTIDE) - Abnormal; Notable for the following components:    Beta Natriuretic Peptide 191 (*)     All other components within normal limits   MANUAL  DIFFERENTIAL - Abnormal; Notable for the following components:    Neutrophil Absolute Manual 14.32 (*)     Monocyte Absolute Manual 1.86 (*)     Metamyelocyte Absolute Manual 0.19 (*)     RBC Morphology See morphology below (*)     All other components within normal limits   SARS-COV-2/FLU A AND B/RSV BY PCR (GENEXPERT) - Abnormal; Notable for the following components:    Influenza A by PCR Positive (*)     All other components within normal limits    Narrative:     This test is intended for the qualitative detection and differentiation of SARS-CoV-2, influenza A, influenza B, and respiratory syncytial virus (RSV) viral RNA in nasopharyngeal or nares swabs from individuals suspected of respiratory viral infection consistent with COVID-19 by their healthcare provider. Signs and symptoms of respiratory viral infection due to SARS-CoV-2, influenza, and RSV can be similar.    Test performed using the Xpert Xpress SARS-CoV-2/FLU/RSV (real time RT-PCR)  assay on the [employer] instrument, [facility], [place] [address].   This test is being used under the Food and Drug Administration's Emergency Use Authorization.    The authorized Fact Sheet for Healthcare Providers for this assay is available upon request from the laboratory.   TROPONIN I HIGH SENSITIVITY - Normal   SCAN SLIDE   RAINBOW DRAW LAVENDER   RAINBOW DRAW LIGHT GREEN   RAINBOW DRAW BLUE     EKG    Rate, intervals and axes as noted on EKG Report.  Rate: 117 bpm  Rhythm: Sinus Rhythm  Reading: Sinus tachycardia, nonspecific changes, abnormal                       MDM          Admission disposition: 2/26/2025  5:48 PM           Medical Decision Making  Differential diagnosis considered for pneumonia, viral illness, influenza, COVID, heart failure.    Problems Addressed:  Acute on chronic congestive heart failure, unspecified heart failure type (HCC): acute illness or injury  Influenza A: acute illness or injury    Amount and/or Complexity of Data Reviewed  Labs:  ordered. Decision-making details documented in ED Course.     Details: Elevated white blood cell count.  Influenza A positive.  Troponin normal.  Radiology: ordered and independent interpretation performed. Decision-making details documented in ED Course.     Details: Interstitial edema identified on chest x-ray.  ECG/medicine tests: ordered and independent interpretation performed. Decision-making details documented in ED Course.  Discussion of management or test interpretation with external provider(s): Discussed with covering physician for her primary care physician Dr. Quiroz and will admit the patient.  Started on Tamiflu.  Cardiology notified of consult as well.  Lasix given in the emergency department.    Risk  Prescription drug management.  Decision regarding hospitalization.        Disposition and Plan     Clinical Impression:  1. Influenza A    2. Acute on chronic congestive heart failure, unspecified heart failure type (HCC)         Disposition:  Admit  2/26/2025  5:48 pm    Follow-up:  No follow-up provider specified.  We recommend that you schedule follow up care with a primary care provider within the next three months to obtain basic health screening including reassessment of your blood pressure.      Medications Prescribed:  Current Discharge Medication List              Supplementary Documentation:         Hospital Problems       Present on Admission  Date Reviewed: 8/9/2022            ICD-10-CM Noted POA    * (Principal) Influenza A J10.1 2/26/2025 Unknown    Acute on chronic congestive heart failure, unspecified heart failure type (HCC) I50.9 11/6/2024 Unknown    Hyperglycemia R73.9 2/26/2025 Yes

## 2025-02-26 NOTE — ED INITIAL ASSESSMENT (HPI)
Pt arrived per EMS from [place]. Pt states woke up this morning with a coughing fit. Has been short of breath since then. EMS reported wheezing bilaterally and gave neb treatment. Pt states feels a little better. Pt still having labored breathing.

## 2025-02-27 LAB
ATRIAL RATE: 117 BPM
LACTATE SERPL-SCNC: 1.2 MMOL/L (ref 0.5–2)
NT-PROBNP SERPL-MCNC: 5511 PG/ML (ref ?–450)
P AXIS: 83 DEGREES
P-R INTERVAL: 190 MS
Q-T INTERVAL: 328 MS
QRS DURATION: 80 MS
QTC CALCULATION (BEZET): 457 MS
R AXIS: -42 DEGREES
T AXIS: 68 DEGREES
VENTRICULAR RATE: 117 BPM

## 2025-02-27 PROCEDURE — 94640 AIRWAY INHALATION TREATMENT: CPT

## 2025-02-27 PROCEDURE — 36415 COLL VENOUS BLD VENIPUNCTURE: CPT | Performed by: INTERNAL MEDICINE

## 2025-02-27 PROCEDURE — 83605 ASSAY OF LACTIC ACID: CPT | Performed by: INTERNAL MEDICINE

## 2025-02-27 PROCEDURE — 94799 UNLISTED PULMONARY SVC/PX: CPT

## 2025-02-27 PROCEDURE — 83880 ASSAY OF NATRIURETIC PEPTIDE: CPT | Performed by: INTERNAL MEDICINE

## 2025-02-27 PROCEDURE — 87040 BLOOD CULTURE FOR BACTERIA: CPT | Performed by: INTERNAL MEDICINE

## 2025-02-27 RX ORDER — ACETAMINOPHEN 325 MG/1
650 TABLET ORAL EVERY 6 HOURS PRN
Status: DISCONTINUED | OUTPATIENT
Start: 2025-02-27 | End: 2025-03-03

## 2025-02-27 RX ORDER — METHYLPREDNISOLONE SODIUM SUCCINATE 40 MG/ML
40 INJECTION INTRAMUSCULAR; INTRAVENOUS EVERY 12 HOURS
Status: DISCONTINUED | OUTPATIENT
Start: 2025-02-27 | End: 2025-03-01

## 2025-02-27 NOTE — H&P
[facility]  part of [facility]    History and Physical    Marilu iDckens Patient Status:  Observation    1927 MRN [id number]   Location [facility] 5SW/SE Attending Adonis Quiroz MD   Hosp Day # 0 PCP ADONIS QUIROZ MD, MD     Date:  2025  Date of Admission:  2025    History provided by:patient  HPI:     Chief Complaint   Patient presents with    Cough/URI    Difficulty Breathing     HPI    History     Past Medical History:    Atherosclerosis of coronary artery    CAD (coronary artery disease)    Carotid artery disease    Dyslipidemia    Renal artery stenosis     Past Surgical History:   Procedure Laterality Date    Appendectomy      Breast surgery Right     Cabg       History reviewed. No pertinent family history.  Social History:  Social History     Socioeconomic History    Marital status:    Tobacco Use    Smoking status: Never    Smokeless tobacco: Never   Substance and Sexual Activity    Alcohol use: Never    Drug use: Never   Other Topics Concern    Caffeine Concern Yes     Comment: 2 cups daily    Exercise Yes     Comment: walks     Social Drivers of Health     Food Insecurity: No Food Insecurity (2024)    Food Insecurity     Food Insecurity: Never true   Transportation Needs: No Transportation Needs (2024)    Transportation Needs     Lack of Transportation: No   Recent Concern: Transportation Needs - At Risk (2024)    Received from [facility]    Transportation Needs     In the past 12 months, has lack of reliable transportation kept you from medical appointments, meetings, work or from getting things needed for daily living? : Yes   Housing Stability: Low Risk  (2024)    Housing Stability     Housing Instability: No     Allergies/Medications:   Allergies: Allergies[1]  Medications Prior to Admission   Medication Sig    ezetimibe 10 MG Oral Tab Take 1 tablet (10 mg total) by mouth daily.    Irbesartan 300 MG Oral Tab Take 1  tablet (300 mg total) by mouth daily.    metoprolol succinate  MG Oral Tablet 24 Hr Take 1 tablet (100 mg total) by mouth daily.    Pravastatin Sodium 80 MG Oral Tab Take 1 tablet (80 mg total) by mouth nightly.    hydrALAZINE 25 MG Oral Tab Take 1 tablet (25 mg total) by mouth daily.    furosemide 20 MG Oral Tab Take 1 tablet (20 mg total) by mouth daily.    amLODIPine 5 MG Oral Tab Take 1 tablet (5 mg total) by mouth at bedtime.    aspirin 81 MG Oral Chew Tab Chew 1 tablet (81 mg total) by mouth daily.       Review of Systems:     Constitutional:  Positive for activity change and fever.   Respiratory:  Positive for cough, chest tightness and shortness of breath.        Physical Exam:   Vital Signs:  Blood pressure 157/59, pulse 97, temperature 99.6 °F (37.6 °C), temperature source Oral, resp. rate 21, height 5' 1\" (1.549 m), weight 132 lb (59.9 kg), SpO2 91%.  Physical Exam  Cardiovascular:      Rate and Rhythm: Tachycardia present.   Pulmonary:      Effort: Pulmonary effort is normal.      Breath sounds: Wheezing present.   Abdominal:      General: Abdomen is flat. Bowel sounds are normal.      Palpations: Abdomen is soft.   Skin:     General: Skin is warm and dry.           Results:     Lab Results   Component Value Date    WBC 18.6 (H) 02/26/2025    HGB 12.6 02/26/2025    HCT 38.6 02/26/2025    .0 (H) 02/26/2025    CREATSERUM 1.07 (H) 02/26/2025    BUN 12 02/26/2025     (L) 02/26/2025    K 3.9 02/26/2025     02/26/2025    CO2 23.0 02/26/2025     (H) 02/26/2025    CA 9.0 02/26/2025    ALB 3.5 11/10/2024    ALKPHO 70 11/10/2024    BILT 0.3 11/10/2024    TP 6.2 11/10/2024    AST 39 (H) 11/10/2024    ALT 30 11/10/2024    MG 1.9 11/10/2024    TROP <0.046 09/11/2017    TROPHS 9 02/26/2025     XR CHEST AP PORTABLE  (CPT=71045)    Result Date: 2/26/2025  CONCLUSION: Mild interstitial edema.     Dictated by (CST): Jewel Infante MD on 2/26/2025 at 5:18 PM     Finalized by (CST): Arelis  MD Jewel on 2/26/2025 at 5:20 PM         EKG 12 Lead    Result Date: 2/26/2025  Sinus tachycardia Left axis deviation Inferior infarct (cited on or before 11-SEP-2017) Possible Anterior infarct (cited on or before 11-SEP-2017) Abnormal ECG When compared with ECG of 06-NOV-2024 18:40, Nonspecific T wave abnormality has replaced inverted T waves in Inferior leads Nonspecific T wave abnormality, improved in Anterior-lateral leads     Assessment/Plan:     Influenza A  Tamiflu  Abc to cover for pneumonia  Bronchodilation  IV steroids      Acute on chronic congestive heart failure, unspecified heart failure type (HCC)  Lasix as able      Hyperglycemia  Monitor sugar    DVT Px                MARLO WALKER MD  2/26/2025         [1]   Allergies  Allergen Reactions    Sulfa Antibiotics

## 2025-02-27 NOTE — ED QUICK NOTES
Orders for admission, patient is aware of plan and ready to go upstairs. Any questions, please call ED CHEVY Holden at extension [phone].     Patient Covid vaccination status: Fully vaccinated     COVID Test Ordered in ED: SARS-CoV-2/Flu A and B/RSV by PCR (GeneXpert)    COVID Suspicion at Admission: N/A    Running Infusions:  None    Mental Status/LOC at time of transport: a/o x 4    Other pertinent information: Pt is from [place]. Pt ambulates with walker.  CIWA score: N/A   NIH score:  N/A

## 2025-02-27 NOTE — PLAN OF CARE
Problem: Patient Centered Care  Goal: Patient preferences are identified and integrated in the patient's plan of care  Description: Interventions:  - What would you like us to know as we care for you?   - Provide timely, complete, and accurate information to patient/family  - Incorporate patient and family knowledge, values, beliefs, and cultural backgrounds into the planning and delivery of care  - Encourage patient/family to participate in care and decision-making at the level they choose  - Honor patient and family perspectives and choices  Outcome: Progressing     Problem: CARDIOVASCULAR - ADULT  Goal: Maintains optimal cardiac output and hemodynamic stability  Description: INTERVENTIONS:  - Monitor vital signs, rhythm, and trends  - Monitor for bleeding, hypotension and signs of decreased cardiac output  - Evaluate effectiveness of vasoactive medications to optimize hemodynamic stability  - Monitor arterial and/or venous puncture sites for bleeding and/or hematoma  - Assess quality of pulses, skin color and temperature  - Assess for signs of decreased coronary artery perfusion - ex. Angina  - Evaluate fluid balance, assess for edema, trend weights  Outcome: Progressing  Goal: Absence of cardiac arrhythmias or at baseline  Description: INTERVENTIONS:  - Continuous cardiac monitoring, monitor vital signs, obtain 12 lead EKG if indicated  - Evaluate effectiveness of antiarrhythmic and heart rate control medications as ordered  - Initiate emergency measures for life threatening arrhythmias  - Monitor electrolytes and administer replacement therapy as ordered  Outcome: Progressing     Problem: RESPIRATORY - ADULT  Goal: Achieves optimal ventilation and oxygenation  Description: INTERVENTIONS:  - Assess for changes in respiratory status  - Assess for changes in mentation and behavior  - Position to facilitate oxygenation and minimize respiratory effort  - Oxygen supplementation based on oxygen saturation or ABGs  -  Provide Smoking Cessation handout, if applicable  - Encourage broncho-pulmonary hygiene including cough, deep breathe, Incentive Spirometry  - Assess the need for suctioning and perform as needed  - Assess and instruct to report SOB or any respiratory difficulty  - Respiratory Therapy support as indicated  - Manage/alleviate anxiety  - Monitor for signs/symptoms of CO2 retention  Outcome: Progressing     Problem: METABOLIC/FLUID AND ELECTROLYTES - ADULT  Goal: Electrolytes maintained within normal limits  Description: INTERVENTIONS:  - Monitor labs and rhythm and assess patient for signs and symptoms of electrolyte imbalances  - Administer electrolyte replacement as ordered  - Monitor response to electrolyte replacements, including rhythm and repeat lab results as appropriate  - Fluid restriction as ordered  - Instruct patient on fluid and nutrition restrictions as appropriate  Outcome: Progressing  Goal: Hemodynamic stability and optimal renal function maintained  Description: INTERVENTIONS:  - Monitor labs and assess for signs and symptoms of volume excess or deficit  - Monitor intake, output and patient weight  - Monitor urine specific gravity, serum osmolarity and serum sodium as indicated or ordered  - Monitor response to interventions for patient's volume status, including labs, urine output, blood pressure (other measures as available)  - Encourage oral intake as appropriate  - Instruct patient on fluid and nutrition restrictions as appropriate  Outcome: Progressing     Problem: MUSCULOSKELETAL - ADULT  Goal: Return mobility to safest level of function  Description: INTERVENTIONS:  - Assess patient stability and activity tolerance for standing, transferring and ambulating w/ or w/o assistive devices  - Assist with transfers and ambulation using safe patient handling equipment as needed  - Ensure adequate protection for wounds/incisions during mobilization  - Obtain PT/OT consults as needed  - Advance activity  as appropriate  - Communicate ordered activity level and limitations with patient/family  Outcome: Progressing     Problem: PAIN - ADULT  Goal: Verbalizes/displays adequate comfort level or patient's stated pain goal  Description: INTERVENTIONS:  - Encourage pt to monitor pain and request assistance  - Assess pain using appropriate pain scale  - Administer analgesics based on type and severity of pain and evaluate response  - Implement non-pharmacological measures as appropriate and evaluate response  - Consider cultural and social influences on pain and pain management  - Manage/alleviate anxiety  - Utilize distraction and/or relaxation techniques  - Monitor for opioid side effects  - Notify MD/LIP if interventions unsuccessful or patient reports new pain  - Anticipate increased pain with activity and pre-medicate as appropriate  Outcome: Progressing     Problem: SAFETY ADULT - FALL  Goal: Free from fall injury  Description: INTERVENTIONS:  - Assess pt frequently for physical needs  - Identify cognitive and physical deficits and behaviors that affect risk of falls.  - [place] fall precautions as indicated by assessment.  - Educate pt/family on patient safety including physical limitations  - Instruct pt to call for assistance with activity based on assessment  - Modify environment to reduce risk of injury  - Provide assistive devices as appropriate  - Consider OT/PT consult to assist with strengthening/mobility  - Encourage toileting schedule  Outcome: Progressing     Problem: DISCHARGE PLANNING  Goal: Discharge to home or other facility with appropriate resources  Description: INTERVENTIONS:  - Identify barriers to discharge w/pt and caregiver  - Include patient/family/discharge partner in discharge planning  - Arrange for needed discharge resources and transportation as appropriate  - Identify discharge learning needs (meds, wound care, etc)  - Arrange for interpreters to assist at discharge as needed  -  Consider post-discharge preferences of patient/family/discharge partner  - Complete POLST form as appropriate  - Assess patient's ability to be responsible for managing their own health  - Refer to Case Management Department for coordinating discharge planning if the patient needs post-hospital services based on physician/LIP order or complex needs related to functional status, cognitive ability or social support system  Outcome: Progressing

## 2025-02-27 NOTE — PLAN OF CARE
Patient is A&Ox4.   Problem: Patient Centered Care  Goal: Patient preferences are identified and integrated in the patient's plan of care  Description: Interventions:  - What would you like us to know as we care for you?   - Provide timely, complete, and accurate information to patient/family  - Incorporate patient and family knowledge, values, beliefs, and cultural backgrounds into the planning and delivery of care  - Encourage patient/family to participate in care and decision-making at the level they choose  - Honor patient and family perspectives and choices  Outcome: Progressing     Problem: CARDIOVASCULAR - ADULT  Goal: Maintains optimal cardiac output and hemodynamic stability  Description: INTERVENTIONS:  - Monitor vital signs, rhythm, and trends  - Monitor for bleeding, hypotension and signs of decreased cardiac output  - Evaluate effectiveness of vasoactive medications to optimize hemodynamic stability  - Monitor arterial and/or venous puncture sites for bleeding and/or hematoma  - Assess quality of pulses, skin color and temperature  - Assess for signs of decreased coronary artery perfusion - ex. Angina  - Evaluate fluid balance, assess for edema, trend weights  Outcome: Progressing  Goal: Absence of cardiac arrhythmias or at baseline  Description: INTERVENTIONS:  - Continuous cardiac monitoring, monitor vital signs, obtain 12 lead EKG if indicated  - Evaluate effectiveness of antiarrhythmic and heart rate control medications as ordered  - Initiate emergency measures for life threatening arrhythmias  - Monitor electrolytes and administer replacement therapy as ordered  Outcome: Progressing     Problem: RESPIRATORY - ADULT  Goal: Achieves optimal ventilation and oxygenation  Description: INTERVENTIONS:  - Assess for changes in respiratory status  - Assess for changes in mentation and behavior  - Position to facilitate oxygenation and minimize respiratory effort  - Oxygen supplementation based on oxygen  saturation or ABGs  - Provide Smoking Cessation handout, if applicable  - Encourage broncho-pulmonary hygiene including cough, deep breathe, Incentive Spirometry  - Assess the need for suctioning and perform as needed  - Assess and instruct to report SOB or any respiratory difficulty  - Respiratory Therapy support as indicated  - Manage/alleviate anxiety  - Monitor for signs/symptoms of CO2 retention  Outcome: Progressing     Problem: METABOLIC/FLUID AND ELECTROLYTES - ADULT  Goal: Electrolytes maintained within normal limits  Description: INTERVENTIONS:  - Monitor labs and rhythm and assess patient for signs and symptoms of electrolyte imbalances  - Administer electrolyte replacement as ordered  - Monitor response to electrolyte replacements, including rhythm and repeat lab results as appropriate  - Fluid restriction as ordered  - Instruct patient on fluid and nutrition restrictions as appropriate  Outcome: Progressing  Goal: Hemodynamic stability and optimal renal function maintained  Description: INTERVENTIONS:  - Monitor labs and assess for signs and symptoms of volume excess or deficit  - Monitor intake, output and patient weight  - Monitor urine specific gravity, serum osmolarity and serum sodium as indicated or ordered  - Monitor response to interventions for patient's volume status, including labs, urine output, blood pressure (other measures as available)  - Encourage oral intake as appropriate  - Instruct patient on fluid and nutrition restrictions as appropriate  Outcome: Progressing     Problem: MUSCULOSKELETAL - ADULT  Goal: Return mobility to safest level of function  Description: INTERVENTIONS:  - Assess patient stability and activity tolerance for standing, transferring and ambulating w/ or w/o assistive devices  - Assist with transfers and ambulation using safe patient handling equipment as needed  - Ensure adequate protection for wounds/incisions during mobilization  - Obtain PT/OT consults as  needed  - Advance activity as appropriate  - Communicate ordered activity level and limitations with patient/family  Outcome: Progressing     Problem: PAIN - ADULT  Goal: Verbalizes/displays adequate comfort level or patient's stated pain goal  Description: INTERVENTIONS:  - Encourage pt to monitor pain and request assistance  - Assess pain using appropriate pain scale  - Administer analgesics based on type and severity of pain and evaluate response  - Implement non-pharmacological measures as appropriate and evaluate response  - Consider cultural and social influences on pain and pain management  - Manage/alleviate anxiety  - Utilize distraction and/or relaxation techniques  - Monitor for opioid side effects  - Notify MD/LIP if interventions unsuccessful or patient reports new pain  - Anticipate increased pain with activity and pre-medicate as appropriate  Outcome: Progressing     Problem: SAFETY ADULT - FALL  Goal: Free from fall injury  Description: INTERVENTIONS:  - Assess pt frequently for physical needs  - Identify cognitive and physical deficits and behaviors that affect risk of falls.  - [place] fall precautions as indicated by assessment.  - Educate pt/family on patient safety including physical limitations  - Instruct pt to call for assistance with activity based on assessment  - Modify environment to reduce risk of injury  - Provide assistive devices as appropriate  - Consider OT/PT consult to assist with strengthening/mobility  - Encourage toileting schedule  Outcome: Progressing     Problem: DISCHARGE PLANNING  Goal: Discharge to home or other facility with appropriate resources  Description: INTERVENTIONS:  - Identify barriers to discharge w/pt and caregiver  - Include patient/family/discharge partner in discharge planning  - Arrange for needed discharge resources and transportation as appropriate  - Identify discharge learning needs (meds, wound care, etc)  - Arrange for interpreters to assist at  discharge as needed  - Consider post-discharge preferences of patient/family/discharge partner  - Complete POLST form as appropriate  - Assess patient's ability to be responsible for managing their own health  - Refer to Case Management Department for coordinating discharge planning if the patient needs post-hospital services based on physician/LIP order or complex needs related to functional status, cognitive ability or social support system  Outcome: Progressing

## 2025-02-28 LAB
ALBUMIN SERPL-MCNC: 3.4 G/DL (ref 3.2–4.8)
ALBUMIN/GLOB SERPL: 1.5 {RATIO} (ref 1–2)
ALP LIVER SERPL-CCNC: 54 U/L
ALT SERPL-CCNC: 13 U/L
ANION GAP SERPL CALC-SCNC: 8 MMOL/L (ref 0–18)
AST SERPL-CCNC: 26 U/L (ref ?–34)
BASOPHILS # BLD AUTO: 0.03 X10(3) UL (ref 0–0.2)
BASOPHILS NFR BLD AUTO: 0.2 %
BILIRUB SERPL-MCNC: 0.2 MG/DL (ref 0.2–0.9)
BUN BLD-MCNC: 26 MG/DL (ref 9–23)
BUN/CREAT SERPL: 24.5 (ref 10–20)
CALCIUM BLD-MCNC: 8.2 MG/DL (ref 8.7–10.4)
CHLORIDE SERPL-SCNC: 98 MMOL/L (ref 98–112)
CO2 SERPL-SCNC: 27 MMOL/L (ref 21–32)
CREAT BLD-MCNC: 1.06 MG/DL
DEPRECATED RDW RBC AUTO: 51 FL (ref 35.1–46.3)
EGFRCR SERPLBLD CKD-EPI 2021: 48 ML/MIN/1.73M2 (ref 60–?)
EOSINOPHIL # BLD AUTO: 0.01 X10(3) UL (ref 0–0.7)
EOSINOPHIL NFR BLD AUTO: 0.1 %
ERYTHROCYTE [DISTWIDTH] IN BLOOD BY AUTOMATED COUNT: 15.9 % (ref 11–15)
GLOBULIN PLAS-MCNC: 2.3 G/DL (ref 2–3.5)
GLUCOSE BLD-MCNC: 167 MG/DL (ref 70–99)
HCT VFR BLD AUTO: 32.4 %
HGB BLD-MCNC: 10.7 G/DL
IMM GRANULOCYTES # BLD AUTO: 0.12 X10(3) UL (ref 0–1)
IMM GRANULOCYTES NFR BLD: 0.7 %
LYMPHOCYTES # BLD AUTO: 0.52 X10(3) UL (ref 1–4)
LYMPHOCYTES NFR BLD AUTO: 3.2 %
MCH RBC QN AUTO: 28.8 PG (ref 26–34)
MCHC RBC AUTO-ENTMCNC: 33 G/DL (ref 31–37)
MCV RBC AUTO: 87.1 FL
MONOCYTES # BLD AUTO: 0.44 X10(3) UL (ref 0.1–1)
MONOCYTES NFR BLD AUTO: 2.7 %
NEUTROPHILS # BLD AUTO: 15.19 X10 (3) UL (ref 1.5–7.7)
NEUTROPHILS # BLD AUTO: 15.19 X10(3) UL (ref 1.5–7.7)
NEUTROPHILS NFR BLD AUTO: 93.1 %
OSMOLALITY SERPL CALC.SUM OF ELEC: 285 MOSM/KG (ref 275–295)
PLATELET # BLD AUTO: 486 10(3)UL (ref 150–450)
POTASSIUM SERPL-SCNC: 4 MMOL/L (ref 3.5–5.1)
PROT SERPL-MCNC: 5.7 G/DL (ref 5.7–8.2)
RBC # BLD AUTO: 3.72 X10(6)UL
SODIUM SERPL-SCNC: 133 MMOL/L (ref 136–145)
WBC # BLD AUTO: 16.3 X10(3) UL (ref 4–11)

## 2025-02-28 PROCEDURE — 94640 AIRWAY INHALATION TREATMENT: CPT

## 2025-02-28 PROCEDURE — 85025 COMPLETE CBC W/AUTO DIFF WBC: CPT | Performed by: INTERNAL MEDICINE

## 2025-02-28 PROCEDURE — 97530 THERAPEUTIC ACTIVITIES: CPT

## 2025-02-28 PROCEDURE — 87086 URINE CULTURE/COLONY COUNT: CPT | Performed by: INTERNAL MEDICINE

## 2025-02-28 PROCEDURE — 97162 PT EVAL MOD COMPLEX 30 MIN: CPT

## 2025-02-28 PROCEDURE — 80053 COMPREHEN METABOLIC PANEL: CPT | Performed by: INTERNAL MEDICINE

## 2025-02-28 RX ORDER — FUROSEMIDE 10 MG/ML
20 INJECTION INTRAMUSCULAR; INTRAVENOUS ONCE
Status: COMPLETED | OUTPATIENT
Start: 2025-02-28 | End: 2025-02-28

## 2025-02-28 NOTE — CONSULTS
Cardiology Consultation  [facility]    Marilu Dickens Patient Status:  Observation    1927 MRN [id number]   Location [facility] 5SW/SE Attending Adonis Quiroz MD   Hosp Day # 0 PCP ADONIS QUIROZ MD, MD     Reason for Consultation:  CHF    History of Present Illness:  Mrailu Dickens is a a(n) 89 year old female with CAD (s/p CABG), HTN, HDL, renal artery stenosis, hx GIB who presented with SOB found to have influenza A. CXR with mild volume overload.    Assessment/Plan:      Acute hypoxic respiratory failure likely multifactorial 2/2 HFpEF and flu.  Acute on chronic HFpEF, precipitated by infection  CAD s/p CABG  HTN, controlled  HDL on statin  Renal artery stenosis- BP controlled    - s/p IV lasix in the ED. She is relatively euvolemic on my exam. Can give extra dose if she has more symptoms.  - contnue home lasix 20mg  - continue ASA/statin  - continue home anti-hypertensives    History:  Past Medical History:    Atherosclerosis of coronary artery    CAD (coronary artery disease)    Carotid artery disease    Dyslipidemia    Hearing impaired person, bilateral    High blood pressure    Renal artery stenosis     Past Surgical History:   Procedure Laterality Date    Appendectomy      Breast surgery Right     Cabg       History reviewed. No pertinent family history.   reports that she has never smoked. She has never used smokeless tobacco. She reports that she does not drink alcohol and does not use drugs.    Allergies:  Allergies[1]    Medications:  Medications Ordered Prior to Encounter[2]    Review of Systems:  Constitutional: denies fevers, chills, night sweats  HEENT: denies headache, vision changes, trouble or pain with swallowing  Cardiac: denies chest pain, palpitations, edema  Pulm: denies dyspnea, cough, wheeze  GI: denies n/v, abd pain, diarrhea or constipation  : denies hematuria, dysuria, incontinence  MSK: denies muscle or joint pains  Neuro: denies numbness, weakness,  paresthesias  Psych: denies anxiety, depression  Integument: denies skin rashes or lesions  Heme: denies easy bruising or bleeding  Endo: denies heat/cold intolerance, skin or nail changes      Physical Exam:  Blood pressure 112/50, pulse 72, temperature 98.2 °F (36.8 °C), temperature source Oral, resp. rate 18, height 5' 1\" (1.549 m), weight 129 lb 6.4 oz (58.7 kg), SpO2 93%.  Wt Readings from Last 3 Encounters:   02/27/25 129 lb 6.4 oz (58.7 kg)   12/10/24 126 lb (57.2 kg)   08/19/23 138 lb 0.1 oz (62.6 kg)       General: awake, alert, oriented x 3, no acute distress  HEENT: at/nc, perrl, eomi  Neck: No JVD, carotids 2+ no bruits.  Cardiac: Regular rate and rhythm, S1, S2 normal, no murmur, rub or gallop.  Lungs: right middle and upper lobe rales, otherwise CTA  Abdomen: Soft, non-tender, non-distended, normal bowel sounds   Extremities: Without clubbing, cyanosis or edema.  Peripheral pulses are 2+.  Neurologic: Alert and oriented, normal affect.  Psych: normal mood and affect  Skin: Warm and dry.       Laboratories and Data:  Diagnostics:      Labs:   CBC:    Lab Results   Component Value Date    WBC 16.3 (H) 02/28/2025    WBC 18.6 (H) 02/26/2025    WBC 9.8 11/10/2024     Lab Results   Component Value Date    HEMOGLOBIN 12.8 03/19/2014    HGB 10.7 (L) 02/28/2025    HGB 12.6 02/26/2025    HGB 11.2 (L) 11/10/2024      Lab Results   Component Value Date    .0 (H) 02/28/2025    .0 (H) 02/26/2025    .0 (H) 11/10/2024     BMP:     Lab Results   Component Value Date    GLUCOSE 121 (H) 02/26/2015     Lab Results   Component Value Date    K 4.0 02/28/2025    K 3.9 02/26/2025    K 4.3 11/10/2024     Lab Results   Component Value Date    BUN 26 (H) 02/28/2025    BUN 12 02/26/2025    BUN 18 11/10/2024     Lab Results   Component Value Date    CREATSERUM 1.06 (H) 02/28/2025    CREATSERUM 1.07 (H) 02/26/2025    CREATSERUM 0.92 11/10/2024     Cholesterol:     Lab Results   Component Value Date    CHOLEST  98 2024    CHOLEST 117.00 2022    CHOLEST 113.00 2020     Lab Results   Component Value Date    HDL 31 (L) 2024    HDL 65 2022    HDL 61 2020     Lab Results   Component Value Date    TRIG 104 2024    TRIG 92.00 2022    TRIG 99.00 2020    TRIGLY 119 2015     Lab Results   Component Value Date    LDL 47 2024    LDL 34 2022    LDL 32 2020     Lab Results   Component Value Date    AST 26 2025    AST 39 (H) 11/10/2024    AST 37 (H) 2024     Lab Results   Component Value Date    ALT 13 2025    ALT 30 11/10/2024    ALT 25 2024           Dg Smith MD  Interventional Cardiology  [place]  2025  7:41 AM         [1]   Allergies  Allergen Reactions    Sulfa Antibiotics    [2]   No current facility-administered medications on file prior to encounter.     Current Outpatient Medications on File Prior to Encounter   Medication Sig Dispense Refill    ezetimibe 10 MG Oral Tab Take 1 tablet (10 mg total) by mouth daily.      Irbesartan 300 MG Oral Tab Take 1 tablet (300 mg total) by mouth daily.      metoprolol succinate  MG Oral Tablet 24 Hr Take 1 tablet (100 mg total) by mouth daily.      Pravastatin Sodium 80 MG Oral Tab Take 1 tablet (80 mg total) by mouth nightly.      hydrALAZINE 25 MG Oral Tab Take 1 tablet (25 mg total) by mouth daily.      [] furosemide 20 MG Oral Tab Take 1 tablet (20 mg total) by mouth daily. 30 tablet 0    amLODIPine 5 MG Oral Tab Take 1 tablet (5 mg total) by mouth at bedtime. 30 tablet 3    aspirin 81 MG Oral Chew Tab Chew 1 tablet (81 mg total) by mouth daily.      [DISCONTINUED] EZETIMIBE 10 MG Oral Tab TAKE 1 TABLET(10 MG) BY MOUTH EVERY DAY 90 tablet 2    [DISCONTINUED] Irbesartan 300 MG Oral Tab Take 1 tablet (300 mg total) by mouth daily. 90 tablet 3    [DISCONTINUED] PRAVASTATIN SODIUM 80 MG Oral Tab TAKE 1 TABLET BY MOUTH EVERY EVENING 90 tablet 3    [DISCONTINUED] METOPROLOL  SUCCINATE 100 MG Oral Tablet 24 Hr TAKE 1 TABLET(100 MG) BY MOUTH EVERY DAY 90 tablet 3

## 2025-02-28 NOTE — PROGRESS NOTES
[facility]  [facility]  Cardiology Progress Note    Marilu Dickens Patient Status:  Inpatient    1927 MRN [id number]   Location [facility] 5SW/SE Attending Adonis Quiroz MD   Hosp Day # 0 PCP ADONIS QUIROZ MD, MD       Impression:  Acute hypoxic respiratory failure likely multifactorial 2/2 HFpEF and flu.  Acute on chronic HFpEF, precipitated by infection  CAD s/p CABG  HTN, controlled  HDL on statin  Renal artery stenosis- BP controlled     - s/p IV lasix in the ED. Pro-BNP elevated yesterday. Will give 1 dose IV lasix 20mg this afternoon and re-assess tomorrow.  - contnue home lasix 20mg daily  - continue ASA/statin  - continue home anti-hypertensives     Subjective:   Cough is much better. No chest pain.    Patient Active Problem List   Diagnosis    CAD (coronary artery disease)    Carotid artery disease    Dyslipidemia    Renal artery stenosis    S/P CABG (coronary artery bypass graft)    Essential hypertension    Syncope and collapse    Hyponatremia    Leukocytosis    Seizure (HCC)    Acute exacerbation of CHF (congestive heart failure) (HCC)    Acute on chronic congestive heart failure, unspecified heart failure type (HCC)    Acute MI (HCC)    Hyperglycemia    Influenza A       Objective:   Temp: 98.1 °F (36.7 °C)  Pulse: 84  Resp: 18  BP: 119/53    Intake/Output:     Intake/Output Summary (Last 24 hours) at 2025 1610  Last data filed at 2025 1300  Gross per 24 hour   Intake 1390 ml   Output 300 ml   Net 1090 ml       Last 3 Weights   25 0544 129 lb 6.4 oz (58.7 kg)   25 2226 129 lb 3.2 oz (58.6 kg)   25 1627 132 lb (59.9 kg)   12/10/24 1100 126 lb (57.2 kg)   24 1000 126 lb (57.2 kg)   11/10/24 0547 128 lb 6.4 oz (58.2 kg)   24 0341 131 lb 12.8 oz (59.8 kg)   24 0442 130 lb 8 oz (59.2 kg)   24 0150 134 lb (60.8 kg)   24 2143 134 lb (60.8 kg)   11/06/24 1845 130 lb (59 kg)   23 138 lb 0.1 oz (62.6 kg)        Tele: NSR    Physical Exam:    General: awake, alert, oriented x 3, no acute distress  HEENT: at/nc, perrl, eomi  Neck: No JVD, carotids 2+ no bruits.  Cardiac: Regular rate and rhythm, S1, S2 normal, no murmur, rub or gallop.  Lungs: right middle and upper lobe rales, otherwise CTA  Abdomen: Soft, non-tender, non-distended, normal bowel sounds   Extremities: Without clubbing, cyanosis or edema.  Peripheral pulses are 2+.  Neurologic: Alert and oriented, normal affect.  Psych: normal mood and affect  Skin: Warm and dry.     Laboratory/Data:    Labs:         Recent Labs   Lab 02/26/25  1647 02/28/25  0535   WBC 18.6* 16.3*   HGB 12.6 10.7*   MCV 87.3 87.1   .0* 486.0*       Recent Labs   Lab 02/26/25  1647 02/28/25  0535   * 133*   K 3.9 4.0    98   CO2 23.0 27.0   BUN 12 26*   CREATSERUM 1.07* 1.06*   CA 9.0 8.2*   * 167*       Recent Labs   Lab 02/28/25  0535   ALT 13   AST 26   ALB 3.4       No results for input(s): \"TROP\" in the last 168 hours.    Allergies:   Allergies[1]    Medications:  Current Facility-Administered Medications   Medication Dose Route Frequency    acetaminophen (Tylenol) tab 650 mg  650 mg Oral Q6H PRN    melatonin tab 3 mg  3 mg Oral Nightly PRN    guaiFENesin (Robitussin) 100 MG/5 ML oral liquid 200 mg  200 mg Oral Q6H PRN    methylPREDNISolone sodium succinate (Solu-MEDROL) injection 40 mg  40 mg Intravenous Q12H    oseltamivir (Tamiflu) cap 30 mg  30 mg Oral Daily    ipratropium-albuterol (Duoneb) 0.5-2.5 (3) MG/3ML inhalation solution 3 mL  3 mL Nebulization 4 times per day    azithromycin (Zithromax) 500 mg in sodium chloride 0.9% 250mL IVPB premix  500 mg Intravenous Q24H    enoxaparin (Lovenox) 30 MG/0.3ML SUBQ injection 30 mg  30 mg Subcutaneous Q24H    amLODIPine (Norvasc) tab 5 mg  5 mg Oral Nightly    ezetimibe (Zetia) tab 10 mg  10 mg Oral Daily    furosemide (Lasix) tab 20 mg  20 mg Oral Daily    hydrALAZINE (Apresoline) tab 25 mg  25 mg Oral Daily     losartan (Cozaar) tab 100 mg  100 mg Oral Daily    metoprolol succinate ER (Toprol XL) 24 hr tab 100 mg  100 mg Oral Daily    atorvastatin (Lipitor) tab 20 mg  20 mg Oral Nightly    cefTRIAXone (Rocephin) 2 g in sodium chloride 0.9% 100 mL IVPB-ADDV  2 g Intravenous Q24H       Dg Smith MD  2/28/2025  4:10 PM         [1]   Allergies  Allergen Reactions    Sulfa Antibiotics

## 2025-02-28 NOTE — PHYSICAL THERAPY NOTE
PHYSICAL THERAPY EVALUATION - INPATIENT     Room Number: 569/569-A  Evaluation Date: 2/28/2025  Type of Evaluation: Initial   Physician Order: PT Eval and Treat    Presenting Problem: fever, chills, weakness, SOB after coughing fit, found to have Flu A, needing  2L O2  Co-Morbidities : HTN, HLD, CHF, CAD s/p CABG  Reason for Therapy: Mobility Dysfunction and Discharge Planning    PHYSICAL THERAPY ASSESSMENT   Patient is a 89 year old female admitted 2/26/2025 for fever, chills, weakness, SOB and needing new oxygen with Flu A diagnosis.  Prior to admission, patient's baseline is independent with a cane or RW. She lives in [facility] and goes down for one meal a day, prepares the others in her room. She is independent with ADL as well and manages her pills.  Patient is currently functioning near baseline with bed mobility, transfers, gait, maintaining seated position, standing prolonged periods, and performing household tasks.  Patient is requiring contact guard assist as a result of the following impairments: decreased endurance/aerobic capacity, decreased muscular endurance, medical status, and increased O2 needs from baseline.  Physical Therapy will continue to follow for duration of hospitalization.    Patient will benefit from continued skilled PT Services at discharge to promote prior level of function.  Anticipate patient will return home with home health PT.    PLAN DURING HOSPITALIZATION  Nursing Mobility Recommendation : 1 Assist  PT Device Recommendation: Rolling walker  PT Treatment Plan: Bed mobility;Endurance;Energy conservation;Patient education;Gait training;Strengthening;Stoop training;Transfer training  Rehab Potential : Good  Frequency (Obs): 5x/week     PHYSICAL THERAPY MEDICAL/SOCIAL HISTORY   History related to current admission: Marilu Dickens is a a(n) 89 year old female with CAD (s/p CABG), HTN, HDL, renal artery stenosis, hx GIB who presented with SOB found to have influenza A. CXR with  mild volume overload.      Problem List  Principal Problem:    Influenza A  Active Problems:    Acute on chronic congestive heart failure, unspecified heart failure type (HCC)    Hyperglycemia      HOME SITUATION  Type of Home: Independent living facility  Home Layout: One level  Stairs to Enter : 0        Stairs to Bedroom: 0         Lives With: Alone;Other (Comment) (goes for one meal a day to dining jaramillo, goes walking daily)    Drives: No   Patient Regularly Uses: Hearing aides;Rollator;Cane;Rolling walker     Prior Level of [place]: Patient lives in her own apt at [place]. She mobilizes with a cane or RW. She does own a rollator but doesn't use it. She walks to the dining room for meals and takes a daily walk and does daily exercises. She is independent with ADL and pill mgmt as well.    SUBJECTIVE  \"You don't have to hold me. My balance is good\"    PHYSICAL THERAPY EXAMINATION   OBJECTIVE  Precautions: Bed/chair alarm;Hard of hearing (New O2 needs,)  Fall Risk: Standard fall risk    WEIGHT BEARING RESTRICTION       PAIN ASSESSMENT  Ratin  Location: no complaints  Management Techniques: Activity promotion    COGNITION  Overall Cognitive Status:  WFL - within functional limits    RANGE OF MOTION AND STRENGTH ASSESSMENT  Upper extremity ROM and strength are within functional limits   Lower extremity ROM is within functional limits   Lower extremity strength is within functional limits     BALANCE  Static Sitting: Normal  Dynamic Sitting: Good  Static Standing: Fair -  Dynamic Standing: Poor +    ACTIVITY TOLERANCE  Pulse: 80  Heart Rate Source: Monitor     BP: 108/66  BP Location: Right arm  BP Method: Automatic  Patient Position: Sitting    O2 WALK  Oxygen Therapy  SPO2% on Oxygen at Rest: 96  At rest oxygen flow (liters per minute): 2  SPO2% Ambulation on Oxygen: 98  Ambulation oxygen flow (liters per minute): 2    AM-PAC '6-Clicks' INPATIENT SHORT FORM - BASIC MOBILITY  How much difficulty does the  patient currently have...  Patient Difficulty: Turning over in bed (including adjusting bedclothes, sheets and blankets)?: None   Patient Difficulty: Sitting down on and standing up from a chair with arms (e.g., wheelchair, bedside commode, etc.): A Little   Patient Difficulty: Moving from lying on back to sitting on the side of the bed?: None   How much help from another person does the patient currently need...   Help from Another: Moving to and from a bed to a chair (including a wheelchair)?: A Little   Help from Another: Need to walk in hospital room?: A Little   Help from Another: Climbing 3-5 steps with a railing?: A Lot     AM-PAC Score:  Raw Score: 19   Approx Degree of Impairment: 41.77%   Standardized Score (AM-PAC Scale): 45.44   CMS Modifier (G-Code): CK    FUNCTIONAL ABILITY STATUS  Functional Mobility/Gait Assessment  Gait Assistance: Supervision  Distance (ft): 50'x3  Assistive Device: Rolling walker;Other (Comment) (2L O2)  Pattern: Within Functional Limits  Rolling: modified independent  Supine to Sit: modified independent  Sit to Supine: modified independent  Sit to Stand: supervision    Exercise/Education Provided:  Bed mobility  Energy conservation  Functional activity tolerated  Gait training  Posture  Strengthening  Transfer training    Skilled Therapy Provided: RN approves participation in therapy session. Mask and gloves worn throughout session. Patient reports she is feeling better since admit with cough improving. She is moving with supervision to [facility] and walking well in room but still needs 2L O2 to maintain SpO2 of 90% or better. She becomes SOB much easier than normal. Education and training in deep breathing while seated as well as coordinating breathing when up walking. Encouraged to be up to chair for all meals with help of staff as well as walking to bathroom with assist. Anticipate as medical status improves she will be able to return home with HHPT. RN ad PCT aware of session, pt  up in chair with alarm set and all questions answered.    The patient's Approx Degree of Impairment: 41.77% has been calculated based on documentation in the [facility] '6 clicks' Inpatient Basic Mobility Short Form.  Research supports that patients with this level of impairment may benefit from home with increased support and HHPT and this is the recommendation. Final disposition will be made by interdisciplinary medical team.    Patient End of Session: Up in chair;Needs met;Call light within reach;RN aware of session/findings;Bracing education provided per handout;All patient questions and concerns addressed;Hospital anti-slip socks;Alarm set;Discussed recommendations with /    CURRENT GOALS  Goals to be met by: 3/10/25  Patient Goal Patient's self-stated goal is: to go home   Goal #1      Goal #1   Current Status    Goal #2 Patient is able to demonstrate transfers Sit to/from Stand at assistance level: modified independent with walker - rolling     Goal #2  Current Status    Goal #3 Patient is able to ambulate 100 feet with assist device: walker - rolling and supplemental O2 as needed  at assistance level: modified independent   Goal #3   Current Status    Goal #4 Patient will negotiate bed to/from chair transfers with Rw and modified independence   Goal #4   Current Status    Goal #5 Patient to demonstrate independence with home activity/exercise instructions provided to patient in preparation for discharge.   Goal #5   Current Status    Goal #6    Goal #6  Current Status      Patient Evaluation Complexity Level:  History High - 3 or more personal factors and/or co-morbidities   Examination of body systems Moderate - addressing a total of 3 or more elements   Clinical Presentation  Moderate - Evolving   Clinical Decision Making  Moderate Complexity     Therapeutic Activity:  25 minutes

## 2025-03-01 PROCEDURE — 94640 AIRWAY INHALATION TREATMENT: CPT

## 2025-03-01 RX ORDER — ASPIRIN 81 MG/1
81 TABLET ORAL DAILY
Status: DISCONTINUED | OUTPATIENT
Start: 2025-03-02 | End: 2025-03-03

## 2025-03-01 RX ORDER — PREDNISONE 20 MG/1
20 TABLET ORAL
Status: DISCONTINUED | OUTPATIENT
Start: 2025-03-02 | End: 2025-03-03

## 2025-03-01 RX ORDER — CEPHALEXIN 500 MG/1
500 CAPSULE ORAL 2 TIMES DAILY
Status: COMPLETED | OUTPATIENT
Start: 2025-03-01 | End: 2025-03-02

## 2025-03-01 NOTE — PROGRESS NOTES
[facility]    Marilu Dickens Patient Status:  Inpatient    1927 MRN [id number]   Location [facility] 5SW/SE Attending Adonis Quiroz MD   Hosp Day # 1 PCP ADONIS QUIROZ MD, MD     Marilu Dickens is a 89 year old female patient.    1. Influenza A    2. Acute on chronic congestive heart failure, unspecified heart failure type (HCC)      On Lasix  Cards following  Switch to PO meds  Tamiflu completed  Bronchodilation  Oxygen, dw RN to wean O2  DW Pt  PT    Past Medical History:    Atherosclerosis of coronary artery    CAD (coronary artery disease)    Carotid artery disease    Dyslipidemia    Hearing impaired person, bilateral    High blood pressure    Renal artery stenosis       No current outpatient medications on file.     Allergies[1]  Principal Problem:    Influenza A  Active Problems:    Acute on chronic congestive heart failure, unspecified heart failure type (HCC)    Hyperglycemia    Blood pressure 136/59, pulse 83, temperature 97.3 °F (36.3 °C), temperature source Axillary, resp. rate 18, height 5' 1\" (1.549 m), weight 132 lb 12.8 oz (60.2 kg), SpO2 92%.    Review of Systems   Respiratory:  Positive for cough, sputum production, shortness of breath and wheezing.    Neurological:  Loss of consciousness: switch to PO meds.       Physical Exam  Cardiovascular:      Pulses: Normal pulses.      Heart sounds: Normal heart sounds.   Pulmonary:      Effort: Pulmonary effort is normal.      Breath sounds: Wheezing present.   Abdominal:      General: Abdomen is flat. Bowel sounds are normal.      Palpations: Abdomen is soft.         MARLO WALKER MD  2025         [1]   Allergies  Allergen Reactions    Sulfa Antibiotics

## 2025-03-01 NOTE — PLAN OF CARE
Problem: Patient Centered Care  Goal: Patient preferences are identified and integrated in the patient's plan of care  Description: Interventions:  - What would you like us to know as we care for you? From [facility]   - Provide timely, complete, and accurate information to patient/family  - Incorporate patient and family knowledge, values, beliefs, and cultural backgrounds into the planning and delivery of care  - Encourage patient/family to participate in care and decision-making at the level they choose  - Honor patient and family perspectives and choices  Outcome: Progressing     Problem: CARDIOVASCULAR - ADULT  Goal: Maintains optimal cardiac output and hemodynamic stability  Description: INTERVENTIONS:  - Monitor vital signs, rhythm, and trends  - Monitor for bleeding, hypotension and signs of decreased cardiac output  - Evaluate effectiveness of vasoactive medications to optimize hemodynamic stability  - Monitor arterial and/or venous puncture sites for bleeding and/or hematoma  - Assess quality of pulses, skin color and temperature  - Assess for signs of decreased coronary artery perfusion - ex. Angina  - Evaluate fluid balance, assess for edema, trend weights  Outcome: Progressing  Goal: Absence of cardiac arrhythmias or at baseline  Description: INTERVENTIONS:  - Continuous cardiac monitoring, monitor vital signs, obtain 12 lead EKG if indicated  - Evaluate effectiveness of antiarrhythmic and heart rate control medications as ordered  - Initiate emergency measures for life threatening arrhythmias  - Monitor electrolytes and administer replacement therapy as ordered  Outcome: Progressing     Problem: RESPIRATORY - ADULT  Goal: Achieves optimal ventilation and oxygenation  Description: INTERVENTIONS:  - Assess for changes in respiratory status  - Assess for changes in mentation and behavior  - Position to facilitate oxygenation and minimize respiratory effort  - Oxygen  supplementation based on oxygen saturation or ABGs  - Provide Smoking Cessation handout, if applicable  - Encourage broncho-pulmonary hygiene including cough, deep breathe, Incentive Spirometry  - Assess the need for suctioning and perform as needed  - Assess and instruct to report SOB or any respiratory difficulty  - Respiratory Therapy support as indicated  - Manage/alleviate anxiety  - Monitor for signs/symptoms of CO2 retention  Outcome: Progressing     Problem: METABOLIC/FLUID AND ELECTROLYTES - ADULT  Goal: Electrolytes maintained within normal limits  Description: INTERVENTIONS:  - Monitor labs and rhythm and assess patient for signs and symptoms of electrolyte imbalances  - Administer electrolyte replacement as ordered  - Monitor response to electrolyte replacements, including rhythm and repeat lab results as appropriate  - Fluid restriction as ordered  - Instruct patient on fluid and nutrition restrictions as appropriate  Outcome: Progressing  Goal: Hemodynamic stability and optimal renal function maintained  Description: INTERVENTIONS:  - Monitor labs and assess for signs and symptoms of volume excess or deficit  - Monitor intake, output and patient weight  - Monitor urine specific gravity, serum osmolarity and serum sodium as indicated or ordered  - Monitor response to interventions for patient's volume status, including labs, urine output, blood pressure (other measures as available)  - Encourage oral intake as appropriate  - Instruct patient on fluid and nutrition restrictions as appropriate  Outcome: Progressing     Problem: MUSCULOSKELETAL - ADULT  Goal: Return mobility to safest level of function  Description: INTERVENTIONS:  - Assess patient stability and activity tolerance for standing, transferring and ambulating w/ or w/o assistive devices  - Assist with transfers and ambulation using safe patient handling equipment as needed  - Ensure adequate protection for wounds/incisions during  mobilization  - Obtain PT/OT consults as needed  - Advance activity as appropriate  - Communicate ordered activity level and limitations with patient/family  Outcome: Progressing     Problem: PAIN - ADULT  Goal: Verbalizes/displays adequate comfort level or patient's stated pain goal  Description: INTERVENTIONS:  - Encourage pt to monitor pain and request assistance  - Assess pain using appropriate pain scale  - Administer analgesics based on type and severity of pain and evaluate response  - Implement non-pharmacological measures as appropriate and evaluate response  - Consider cultural and social influences on pain and pain management  - Manage/alleviate anxiety  - Utilize distraction and/or relaxation techniques  - Monitor for opioid side effects  - Notify MD/LIP if interventions unsuccessful or patient reports new pain  - Anticipate increased pain with activity and pre-medicate as appropriate  Outcome: Progressing     Problem: SAFETY ADULT - FALL  Goal: Free from fall injury  Description: INTERVENTIONS:  - Assess pt frequently for physical needs  - Identify cognitive and physical deficits and behaviors that affect risk of falls.  - [place] fall precautions as indicated by assessment.  - Educate pt/family on patient safety including physical limitations  - Instruct pt to call for assistance with activity based on assessment  - Modify environment to reduce risk of injury  - Provide assistive devices as appropriate  - Consider OT/PT consult to assist with strengthening/mobility  - Encourage toileting schedule  Outcome: Progressing     Problem: DISCHARGE PLANNING  Goal: Discharge to home or other facility with appropriate resources  Description: INTERVENTIONS:  - Identify barriers to discharge w/pt and caregiver  - Include patient/family/discharge partner in discharge planning  - Arrange for needed discharge resources and transportation as appropriate  - Identify discharge learning needs (meds, wound care, etc)  -  Arrange for interpreters to assist at discharge as needed  - Consider post-discharge preferences of patient/family/discharge partner  - Complete POLST form as appropriate  - Assess patient's ability to be responsible for managing their own health  - Refer to Case Management Department for coordinating discharge planning if the patient needs post-hospital services based on physician/LIP order or complex needs related to functional status, cognitive ability or social support system  Outcome: Progressing     Problem: Patient/Family Goals  Goal: Patient/Family Long Term Goal  Description: Patient's Long Term Goal: Discharge from the hospital    Interventions:  - Monitor vital signs  - Monitor appropriate labs  - Pain management  - Administer medications per order  - Follow MD orders  - Diagnostics per order  - Update / inform patient and family on plan of care  - Discharge planning  - See additional Care Plan goals for specific interventions  Outcome: Progressing  Goal: Patient/Family Short Term Goal  Description: Patient's Short Term Goal: Improve shortness of breath and cough    Interventions:   - Monitor vital signs  - Monitor appropriate labs  - Pain management  - Administer medications per order  - Follow MD orders  - Diagnostics per order  - Update / inform patient and family on plan of care  - See additional Care Plan goals for specific interventions  Outcome: Progressing     Problem: SKIN/TISSUE INTEGRITY - ADULT  Goal: Skin integrity remains intact  Description: INTERVENTIONS  - Assess and document risk factors for pressure ulcer development  - Assess and document skin integrity  - Monitor for areas of redness and/or skin breakdown  - Initiate interventions, skin care algorithm/standards of care as needed  Outcome: Progressing      Monitoring vital signs- see flowsheets. No acute changes noted at this moment. Safety and fall precautions maintained- bed alarm on, bed locked in lowest position, call light within  reach. Frequent rounding by nursing staff.

## 2025-03-01 NOTE — CM/SW NOTE
Per chart review of PT/OT recommendations:  HH  Anticipate patient will return home with home health PT.     HOME SITUATION  Type of Home: Independent living facility  Home Layout: One level  Stairs to Enter : 0        Stairs to Bedroom: 0         Lives With: Alone;Other (Comment) (goes for one meal a day to dining jaramillo, goes walking daily)    Drives: No   Patient Regularly Uses: Hearing aides;Rollator;Cane;Rolling walker      Prior Level of [place]: Patient lives in her own apt at [place]. She mobilizes with a cane or RW. She does own a rollator but doesn't use it. She walks to the dining room for meals and takes a daily walk and does daily exercises. She is independent with ADL and pill mgmt as well.    CM requested department  ([facility]) to initiate [facility] referral for HHC. FACE TO FACE order in [facility] for MD to co-sign    RN case manager to remain available for support and/or discharge planning.     Priscilla LAMB, RN, CCM  PRN RN CCM  Ext.  [phone]

## 2025-03-01 NOTE — PLAN OF CARE
Problem: Patient Centered Care  Goal: Patient preferences are identified and integrated in the patient's plan of care  Description: Interventions:  - Provide timely, complete, and accurate information to patient/family  - Incorporate patient and family knowledge, values, beliefs, and cultural backgrounds into the planning and delivery of care  - Encourage patient/family to participate in care and decision-making at the level they choose  - Honor patient and family perspectives and choices  Outcome: Progressing     Problem: CARDIOVASCULAR - ADULT  Goal: Maintains optimal cardiac output and hemodynamic stability  Description: INTERVENTIONS:  - Monitor vital signs, rhythm, and trends  - Monitor for bleeding, hypotension and signs of decreased cardiac output  - Evaluate effectiveness of vasoactive medications to optimize hemodynamic stability  - Monitor arterial and/or venous puncture sites for bleeding and/or hematoma  - Assess quality of pulses, skin color and temperature  - Assess for signs of decreased coronary artery perfusion - ex. Angina  - Evaluate fluid balance, assess for edema, trend weights  Outcome: Progressing  Goal: Absence of cardiac arrhythmias or at baseline  Description: INTERVENTIONS:  - Continuous cardiac monitoring, monitor vital signs, obtain 12 lead EKG if indicated  - Evaluate effectiveness of antiarrhythmic and heart rate control medications as ordered  - Initiate emergency measures for life threatening arrhythmias  - Monitor electrolytes and administer replacement therapy as ordered  Outcome: Progressing     Problem: RESPIRATORY - ADULT  Goal: Achieves optimal ventilation and oxygenation  Description: INTERVENTIONS:  - Assess for changes in respiratory status  - Assess for changes in mentation and behavior  - Position to facilitate oxygenation and minimize respiratory effort  - Oxygen supplementation based on oxygen saturation or ABGs  - Provide Smoking Cessation handout, if applicable  -  Encourage broncho-pulmonary hygiene including cough, deep breathe, Incentive Spirometry  - Assess the need for suctioning and perform as needed  - Assess and instruct to report SOB or any respiratory difficulty  - Respiratory Therapy support as indicated  - Manage/alleviate anxiety  - Monitor for signs/symptoms of CO2 retention  Outcome: Progressing     Problem: METABOLIC/FLUID AND ELECTROLYTES - ADULT  Goal: Electrolytes maintained within normal limits  Description: INTERVENTIONS:  - Monitor labs and rhythm and assess patient for signs and symptoms of electrolyte imbalances  - Administer electrolyte replacement as ordered  - Monitor response to electrolyte replacements, including rhythm and repeat lab results as appropriate  - Fluid restriction as ordered  - Instruct patient on fluid and nutrition restrictions as appropriate  Outcome: Progressing  Goal: Hemodynamic stability and optimal renal function maintained  Description: INTERVENTIONS:  - Monitor labs and assess for signs and symptoms of volume excess or deficit  - Monitor intake, output and patient weight  - Monitor urine specific gravity, serum osmolarity and serum sodium as indicated or ordered  - Monitor response to interventions for patient's volume status, including labs, urine output, blood pressure (other measures as available)  - Encourage oral intake as appropriate  - Instruct patient on fluid and nutrition restrictions as appropriate  Outcome: Progressing     Problem: MUSCULOSKELETAL - ADULT  Goal: Return mobility to safest level of function  Description: INTERVENTIONS:  - Assess patient stability and activity tolerance for standing, transferring and ambulating w/ or w/o assistive devices  - Assist with transfers and ambulation using safe patient handling equipment as needed  - Ensure adequate protection for wounds/incisions during mobilization  - Obtain PT/OT consults as needed  - Advance activity as appropriate  - Communicate ordered activity  level and limitations with patient/family  Outcome: Progressing     Problem: PAIN - ADULT  Goal: Verbalizes/displays adequate comfort level or patient's stated pain goal  Description: INTERVENTIONS:  - Encourage pt to monitor pain and request assistance  - Assess pain using appropriate pain scale  - Administer analgesics based on type and severity of pain and evaluate response  - Implement non-pharmacological measures as appropriate and evaluate response  - Consider cultural and social influences on pain and pain management  - Manage/alleviate anxiety  - Utilize distraction and/or relaxation techniques  - Monitor for opioid side effects  - Notify MD/LIP if interventions unsuccessful or patient reports new pain  - Anticipate increased pain with activity and pre-medicate as appropriate  Outcome: Progressing     Problem: SAFETY ADULT - FALL  Goal: Free from fall injury  Description: INTERVENTIONS:  - Assess pt frequently for physical needs  - Identify cognitive and physical deficits and behaviors that affect risk of falls.  - [place] fall precautions as indicated by assessment.  - Educate pt/family on patient safety including physical limitations  - Instruct pt to call for assistance with activity based on assessment  - Modify environment to reduce risk of injury  - Provide assistive devices as appropriate  - Consider OT/PT consult to assist with strengthening/mobility  - Encourage toileting schedule  Outcome: Progressing     Problem: DISCHARGE PLANNING  Goal: Discharge to home or other facility with appropriate resources  Description: INTERVENTIONS:  - Identify barriers to discharge w/pt and caregiver  - Include patient/family/discharge partner in discharge planning  - Arrange for needed discharge resources and transportation as appropriate  - Identify discharge learning needs (meds, wound care, etc)  - Arrange for interpreters to assist at discharge as needed  - Consider post-discharge preferences of  patient/family/discharge partner  - Complete POLST form as appropriate  - Assess patient's ability to be responsible for managing their own health  - Refer to Case Management Department for coordinating discharge planning if the patient needs post-hospital services based on physician/LIP order or complex needs related to functional status, cognitive ability or social support system  Outcome: Progressing

## 2025-03-01 NOTE — PROGRESS NOTES
[facility]    Marilu Dickens Patient Status:  Inpatient    1927 MRN [id number]   Location [facility] 5SW/SE Attending Adonis Quiroz MD   Hosp Day # 0 PCP ADONIS QUIROZ MD, MD     Marilu Dickens is a 89 year old female patient.    1. Influenza A    2. Acute on chronic congestive heart failure, unspecified heart failure type (HCC)      On Lasix  Cards following  IV steroids  Tamiflu  Bronchodilation  Oxygen  DW Pt  PT    Past Medical History:    Atherosclerosis of coronary artery    CAD (coronary artery disease)    Carotid artery disease    Dyslipidemia    Hearing impaired person, bilateral    High blood pressure    Renal artery stenosis       No current outpatient medications on file.     Allergies[1]  Principal Problem:    Influenza A  Active Problems:    Acute on chronic congestive heart failure, unspecified heart failure type (HCC)    Hyperglycemia    Blood pressure 119/53, pulse 84, temperature 98.1 °F (36.7 °C), temperature source Oral, resp. rate 18, height 5' 1\" (1.549 m), weight 129 lb 6.4 oz (58.7 kg), SpO2 95%.    Review of Systems   Respiratory:  Positive for cough, sputum production, shortness of breath and wheezing.        Physical Exam  Cardiovascular:      Pulses: Normal pulses.      Heart sounds: Normal heart sounds.   Pulmonary:      Effort: Pulmonary effort is normal.      Breath sounds: Wheezing present.   Abdominal:      General: Abdomen is flat. Bowel sounds are normal.      Palpations: Abdomen is soft.         MARLO WALKER MD  2025       [1]   Allergies  Allergen Reactions    Sulfa Antibiotics

## 2025-03-01 NOTE — CM/SW NOTE
Department  notified of request for HHC, aidin referrals started. Assigned CM/SW to follow up with pt/family on further discharge planning.     Tennille Izaguirre, DSC

## 2025-03-01 NOTE — PROGRESS NOTES
[facility]    Cardiology Progress Note    Marilu Dickens Patient Status:  Inpatient    1927 MRN [id number]   Location [facility] 5SW/SE Attending Adonis Quiroz MD   Hosp Day # 1 PCP ADONIS QUIROZ MD, MD       Impression/Plan:  89 year old female presenting with:    Acute hypoxic respiratory failure likely multifactorial 2/2 HFpEF and flu.  Acute on chronic HFpEF, precipitated by infection (EF 50-55% 2024)  CAD s/p CABG  HTN, controlled  HDL on statin  Renal artery stenosis- BP controlled    - Cont asa, statin, zetia, bb, arb  - Cont amlodipine, hydralazine for BP control  - Cont home po lasix 20mg daily; IV doses as needed  - Flu management as per primary/pulm  - Monitor on tele  - Will follow      Subjective: No events overnight.  Today patient states breathing improved, denies chest pain or palpitations.    Patient Active Problem List   Diagnosis    CAD (coronary artery disease)    Carotid artery disease    Dyslipidemia    Renal artery stenosis    S/P CABG (coronary artery bypass graft)    Essential hypertension    Syncope and collapse    Hyponatremia    Leukocytosis    Seizure (HCC)    Acute exacerbation of CHF (congestive heart failure) (HCC)    Acute on chronic congestive heart failure, unspecified heart failure type (HCC)    Acute MI (HCC)    Hyperglycemia    Influenza A       Objective:   Temp: 97.3 °F (36.3 °C)  Pulse: 83  Resp: 18  BP: 136/59    Intake/Output:     Intake/Output Summary (Last 24 hours) at 3/1/2025 1127  Last data filed at 3/1/2025 0944  Gross per 24 hour   Intake 1010 ml   Output 400 ml   Net 610 ml       Last 3 Weights   25 0612 132 lb 12.8 oz (60.2 kg)   25 0544 129 lb 6.4 oz (58.7 kg)   25 2226 129 lb 3.2 oz (58.6 kg)   25 1627 132 lb (59.9 kg)   12/10/24 1100 126 lb (57.2 kg)   24 1000 126 lb (57.2 kg)   11/10/24 0547 128 lb 6.4 oz (58.2 kg)   24 0341 131 lb 12.8 oz (59.8 kg)   24 0442 130 lb  8 oz (59.2 kg)   11/07/24 0150 134 lb (60.8 kg)   11/06/24 2143 134 lb (60.8 kg)   11/06/24 1845 130 lb (59 kg)   08/19/23 1923 138 lb 0.1 oz (62.6 kg)         Physical Exam:    General: Alert and oriented x 3. No apparent distress. No respiratory or constitutional distress.  HEENT: Normocephalic, anicteric sclera, neck supple, no thyromegaly or adenopathy.  Neck: No JVD, carotids 2+, no bruits.  Cardiac: Regular rate and rhythm. S1, S2 normal. No murmur, pericardial rub, S3, thrill, heave or extra cardiac sounds.  Lungs: + Faint wheezing bilat  Abdomen: Soft, non-tender. No organosplenomegally, mass or rebound, BS-present.  Extremities: + Trace LLE edema  Neurologic: Alert and oriented, normal affect. No motor or coordinational deficit.  Skin: Warm and dry.     Laboratory/Data:    Labs:         Recent Labs   Lab 02/26/25  1647 02/28/25  0535   WBC 18.6* 16.3*   HGB 12.6 10.7*   MCV 87.3 87.1   .0* 486.0*       Recent Labs   Lab 02/26/25  1647 02/28/25  0535   * 133*   K 3.9 4.0    98   CO2 23.0 27.0   BUN 12 26*   CREATSERUM 1.07* 1.06*   CA 9.0 8.2*   * 167*       Recent Labs   Lab 02/28/25  0535   ALT 13   AST 26   ALB 3.4       No results for input(s): \"TROP\" in the last 168 hours.    Allergies:   Allergies[1]    Medications:  Current Facility-Administered Medications   Medication Dose Route Frequency    [START ON 3/2/2025] predniSONE (Deltasone) tab 20 mg  20 mg Oral Daily with breakfast    cephalexin (Keflex) cap 250 mg  250 mg Oral BID    acetaminophen (Tylenol) tab 650 mg  650 mg Oral Q6H PRN    melatonin tab 3 mg  3 mg Oral Nightly PRN    guaiFENesin (Robitussin) 100 MG/5 ML oral liquid 200 mg  200 mg Oral Q6H PRN    oseltamivir (Tamiflu) cap 30 mg  30 mg Oral Daily    ipratropium-albuterol (Duoneb) 0.5-2.5 (3) MG/3ML inhalation solution 3 mL  3 mL Nebulization 4 times per day    enoxaparin (Lovenox) 30 MG/0.3ML SUBQ injection 30 mg  30 mg Subcutaneous Q24H    amLODIPine (Norvasc)  tab 5 mg  5 mg Oral Nightly    ezetimibe (Zetia) tab 10 mg  10 mg Oral Daily    furosemide (Lasix) tab 20 mg  20 mg Oral Daily    hydrALAZINE (Apresoline) tab 25 mg  25 mg Oral Daily    losartan (Cozaar) tab 100 mg  100 mg Oral Daily    metoprolol succinate ER (Toprol XL) 24 hr tab 100 mg  100 mg Oral Daily    atorvastatin (Lipitor) tab 20 mg  20 mg Oral Nightly       Sudhakar Sibley MD  3/1/2025  11:27 AM         [1]   Allergies  Allergen Reactions    Sulfa Antibiotics

## 2025-03-02 ENCOUNTER — APPOINTMENT (OUTPATIENT)
Dept: GENERAL RADIOLOGY | Facility: HOSPITAL | Age: OVER 89
End: 2025-03-02
Attending: INTERNAL MEDICINE
Payer: MEDICARE

## 2025-03-02 PROCEDURE — 94640 AIRWAY INHALATION TREATMENT: CPT

## 2025-03-02 PROCEDURE — 71045 X-RAY EXAM CHEST 1 VIEW: CPT | Performed by: INTERNAL MEDICINE

## 2025-03-02 PROCEDURE — 94799 UNLISTED PULMONARY SVC/PX: CPT

## 2025-03-02 RX ORDER — FUROSEMIDE 10 MG/ML
20 INJECTION INTRAMUSCULAR; INTRAVENOUS ONCE
Status: COMPLETED | OUTPATIENT
Start: 2025-03-02 | End: 2025-03-02

## 2025-03-02 NOTE — PROGRESS NOTES
[facility]    Marilu Dickens Patient Status:  Inpatient    1927 MRN [id number]   Location [facility] 5SW/SE Attending Adonis Quiroz MD   Hosp Day # 2 PCP ADONIS QUIROZ MD, MD     Marilu Dickens is a 89 year old female patient.    1. Influenza A    2. Acute on chronic congestive heart failure, unspecified heart failure type (HCC)      On Lasix  Cards following  PO abc and steroids  Tamiflu completed  Bronchodilation  Oxygen, dw RN to wean O2  DW Pt  Dc in am to [facility]    Past Medical History:    Atherosclerosis of coronary artery    CAD (coronary artery disease)    Carotid artery disease    Dyslipidemia    Hearing impaired person, bilateral    High blood pressure    Renal artery stenosis       No current outpatient medications on file.     Allergies[1]  Principal Problem:    Influenza A  Active Problems:    Acute on chronic congestive heart failure, unspecified heart failure type (HCC)    Hyperglycemia    Blood pressure 134/54, pulse 79, temperature 98.4 °F (36.9 °C), temperature source Oral, resp. rate 20, height 5' 1\" (1.549 m), weight 132 lb 12.8 oz (60.2 kg), SpO2 96%.    Review of Systems   Respiratory:  Positive for cough, sputum production, shortness of breath and wheezing.    Neurological:  Loss of consciousness: switch to PO meds.       Physical Exam  Cardiovascular:      Pulses: Normal pulses.      Heart sounds: Normal heart sounds.   Pulmonary:      Effort: Pulmonary effort is normal.      Breath sounds: Wheezing present.   Abdominal:      General: Abdomen is flat. Bowel sounds are normal.      Palpations: Abdomen is soft.         MARLO WALKER MD  2025       [1]   Allergies  Allergen Reactions    Sulfa Antibiotics

## 2025-03-02 NOTE — PLAN OF CARE
Problem: Patient Centered Care  Goal: Patient preferences are identified and integrated in the patient's plan of care  Description: Interventions:  - What would you like us to know as we care for you? From [facility]   - Provide timely, complete, and accurate information to patient/family  - Incorporate patient and family knowledge, values, beliefs, and cultural backgrounds into the planning and delivery of care  - Encourage patient/family to participate in care and decision-making at the level they choose  - Honor patient and family perspectives and choices  Outcome: Progressing     Problem: CARDIOVASCULAR - ADULT  Goal: Maintains optimal cardiac output and hemodynamic stability  Description: INTERVENTIONS:  - Monitor vital signs, rhythm, and trends  - Monitor for bleeding, hypotension and signs of decreased cardiac output  - Evaluate effectiveness of vasoactive medications to optimize hemodynamic stability  - Monitor arterial and/or venous puncture sites for bleeding and/or hematoma  - Assess quality of pulses, skin color and temperature  - Assess for signs of decreased coronary artery perfusion - ex. Angina  - Evaluate fluid balance, assess for edema, trend weights  Outcome: Progressing  Goal: Absence of cardiac arrhythmias or at baseline  Description: INTERVENTIONS:  - Continuous cardiac monitoring, monitor vital signs, obtain 12 lead EKG if indicated  - Evaluate effectiveness of antiarrhythmic and heart rate control medications as ordered  - Initiate emergency measures for life threatening arrhythmias  - Monitor electrolytes and administer replacement therapy as ordered  Outcome: Progressing     Problem: RESPIRATORY - ADULT  Goal: Achieves optimal ventilation and oxygenation  Description: INTERVENTIONS:  - Assess for changes in respiratory status  - Assess for changes in mentation and behavior  - Position to facilitate oxygenation and minimize respiratory effort  - Oxygen  supplementation based on oxygen saturation or ABGs  - Provide Smoking Cessation handout, if applicable  - Encourage broncho-pulmonary hygiene including cough, deep breathe, Incentive Spirometry  - Assess the need for suctioning and perform as needed  - Assess and instruct to report SOB or any respiratory difficulty  - Respiratory Therapy support as indicated  - Manage/alleviate anxiety  - Monitor for signs/symptoms of CO2 retention  Outcome: Progressing     Problem: METABOLIC/FLUID AND ELECTROLYTES - ADULT  Goal: Electrolytes maintained within normal limits  Description: INTERVENTIONS:  - Monitor labs and rhythm and assess patient for signs and symptoms of electrolyte imbalances  - Administer electrolyte replacement as ordered  - Monitor response to electrolyte replacements, including rhythm and repeat lab results as appropriate  - Fluid restriction as ordered  - Instruct patient on fluid and nutrition restrictions as appropriate  Outcome: Progressing  Goal: Hemodynamic stability and optimal renal function maintained  Description: INTERVENTIONS:  - Monitor labs and assess for signs and symptoms of volume excess or deficit  - Monitor intake, output and patient weight  - Monitor urine specific gravity, serum osmolarity and serum sodium as indicated or ordered  - Monitor response to interventions for patient's volume status, including labs, urine output, blood pressure (other measures as available)  - Encourage oral intake as appropriate  - Instruct patient on fluid and nutrition restrictions as appropriate  Outcome: Progressing     Problem: MUSCULOSKELETAL - ADULT  Goal: Return mobility to safest level of function  Description: INTERVENTIONS:  - Assess patient stability and activity tolerance for standing, transferring and ambulating w/ or w/o assistive devices  - Assist with transfers and ambulation using safe patient handling equipment as needed  - Ensure adequate protection for wounds/incisions during  mobilization  - Obtain PT/OT consults as needed  - Advance activity as appropriate  - Communicate ordered activity level and limitations with patient/family  Outcome: Progressing     Problem: PAIN - ADULT  Goal: Verbalizes/displays adequate comfort level or patient's stated pain goal  Description: INTERVENTIONS:  - Encourage pt to monitor pain and request assistance  - Assess pain using appropriate pain scale  - Administer analgesics based on type and severity of pain and evaluate response  - Implement non-pharmacological measures as appropriate and evaluate response  - Consider cultural and social influences on pain and pain management  - Manage/alleviate anxiety  - Utilize distraction and/or relaxation techniques  - Monitor for opioid side effects  - Notify MD/LIP if interventions unsuccessful or patient reports new pain  - Anticipate increased pain with activity and pre-medicate as appropriate  Outcome: Progressing     Problem: SAFETY ADULT - FALL  Goal: Free from fall injury  Description: INTERVENTIONS:  - Assess pt frequently for physical needs  - Identify cognitive and physical deficits and behaviors that affect risk of falls.  - [place] fall precautions as indicated by assessment.  - Educate pt/family on patient safety including physical limitations  - Instruct pt to call for assistance with activity based on assessment  - Modify environment to reduce risk of injury  - Provide assistive devices as appropriate  - Consider OT/PT consult to assist with strengthening/mobility  - Encourage toileting schedule  Outcome: Progressing     Problem: DISCHARGE PLANNING  Goal: Discharge to home or other facility with appropriate resources  Description: INTERVENTIONS:  - Identify barriers to discharge w/pt and caregiver  - Include patient/family/discharge partner in discharge planning  - Arrange for needed discharge resources and transportation as appropriate  - Identify discharge learning needs (meds, wound care, etc)  -  Arrange for interpreters to assist at discharge as needed  - Consider post-discharge preferences of patient/family/discharge partner  - Complete POLST form as appropriate  - Assess patient's ability to be responsible for managing their own health  - Refer to Case Management Department for coordinating discharge planning if the patient needs post-hospital services based on physician/LIP order or complex needs related to functional status, cognitive ability or social support system  Outcome: Progressing     Problem: Patient/Family Goals  Goal: Patient/Family Long Term Goal  Description: Patient's Long Term Goal: Discharge from the hospital    Interventions:  - Monitor vital signs  - Monitor appropriate labs  - Pain management  - Administer medications per order  - Follow MD orders  - Diagnostics per order  - Update / inform patient and family on plan of care  - Discharge planning  - See additional Care Plan goals for specific interventions  Outcome: Progressing  Goal: Patient/Family Short Term Goal  Description: Patient's Short Term Goal: Improve shortness of breath and cough    Interventions:   - Monitor vital signs  - Monitor appropriate labs  - Pain management  - Administer medications per order  - Follow MD orders  - Diagnostics per order  - Update / inform patient and family on plan of care  - See additional Care Plan goals for specific interventions  Outcome: Progressing     Problem: SKIN/TISSUE INTEGRITY - ADULT  Goal: Skin integrity remains intact  Description: INTERVENTIONS  - Assess and document risk factors for pressure ulcer development  - Assess and document skin integrity  - Monitor for areas of redness and/or skin breakdown  - Initiate interventions, skin care algorithm/standards of care as needed  Outcome: Progressing

## 2025-03-02 NOTE — PLAN OF CARE
Problem: Patient Centered Care  Goal: Patient preferences are identified and integrated in the patient's plan of care  Description: Interventions:  - What would you like us to know as we care for you? From [facility]   - Provide timely, complete, and accurate information to patient/family  - Incorporate patient and family knowledge, values, beliefs, and cultural backgrounds into the planning and delivery of care  - Encourage patient/family to participate in care and decision-making at the level they choose  - Honor patient and family perspectives and choices  Outcome: Progressing     Problem: CARDIOVASCULAR - ADULT  Goal: Maintains optimal cardiac output and hemodynamic stability  Description: INTERVENTIONS:  - Monitor vital signs, rhythm, and trends  - Monitor for bleeding, hypotension and signs of decreased cardiac output  - Evaluate effectiveness of vasoactive medications to optimize hemodynamic stability  - Monitor arterial and/or venous puncture sites for bleeding and/or hematoma  - Assess quality of pulses, skin color and temperature  - Assess for signs of decreased coronary artery perfusion - ex. Angina  - Evaluate fluid balance, assess for edema, trend weights  Outcome: Progressing  Goal: Absence of cardiac arrhythmias or at baseline  Description: INTERVENTIONS:  - Continuous cardiac monitoring, monitor vital signs, obtain 12 lead EKG if indicated  - Evaluate effectiveness of antiarrhythmic and heart rate control medications as ordered  - Initiate emergency measures for life threatening arrhythmias  - Monitor electrolytes and administer replacement therapy as ordered  Outcome: Progressing     Problem: RESPIRATORY - ADULT  Goal: Achieves optimal ventilation and oxygenation  Description: INTERVENTIONS:  - Assess for changes in respiratory status  - Assess for changes in mentation and behavior  - Position to facilitate oxygenation and minimize respiratory effort  - Oxygen  supplementation based on oxygen saturation or ABGs  - Provide Smoking Cessation handout, if applicable  - Encourage broncho-pulmonary hygiene including cough, deep breathe, Incentive Spirometry  - Assess the need for suctioning and perform as needed  - Assess and instruct to report SOB or any respiratory difficulty  - Respiratory Therapy support as indicated  - Manage/alleviate anxiety  - Monitor for signs/symptoms of CO2 retention  Outcome: Progressing     Problem: METABOLIC/FLUID AND ELECTROLYTES - ADULT  Goal: Electrolytes maintained within normal limits  Description: INTERVENTIONS:  - Monitor labs and rhythm and assess patient for signs and symptoms of electrolyte imbalances  - Administer electrolyte replacement as ordered  - Monitor response to electrolyte replacements, including rhythm and repeat lab results as appropriate  - Fluid restriction as ordered  - Instruct patient on fluid and nutrition restrictions as appropriate  Outcome: Progressing  Goal: Hemodynamic stability and optimal renal function maintained  Description: INTERVENTIONS:  - Monitor labs and assess for signs and symptoms of volume excess or deficit  - Monitor intake, output and patient weight  - Monitor urine specific gravity, serum osmolarity and serum sodium as indicated or ordered  - Monitor response to interventions for patient's volume status, including labs, urine output, blood pressure (other measures as available)  - Encourage oral intake as appropriate  - Instruct patient on fluid and nutrition restrictions as appropriate  Outcome: Progressing     Problem: MUSCULOSKELETAL - ADULT  Goal: Return mobility to safest level of function  Description: INTERVENTIONS:  - Assess patient stability and activity tolerance for standing, transferring and ambulating w/ or w/o assistive devices  - Assist with transfers and ambulation using safe patient handling equipment as needed  - Ensure adequate protection for wounds/incisions during  mobilization  - Obtain PT/OT consults as needed  - Advance activity as appropriate  - Communicate ordered activity level and limitations with patient/family  Outcome: Progressing     Problem: PAIN - ADULT  Goal: Verbalizes/displays adequate comfort level or patient's stated pain goal  Description: INTERVENTIONS:  - Encourage pt to monitor pain and request assistance  - Assess pain using appropriate pain scale  - Administer analgesics based on type and severity of pain and evaluate response  - Implement non-pharmacological measures as appropriate and evaluate response  - Consider cultural and social influences on pain and pain management  - Manage/alleviate anxiety  - Utilize distraction and/or relaxation techniques  - Monitor for opioid side effects  - Notify MD/LIP if interventions unsuccessful or patient reports new pain  - Anticipate increased pain with activity and pre-medicate as appropriate  Outcome: Progressing     Problem: SAFETY ADULT - FALL  Goal: Free from fall injury  Description: INTERVENTIONS:  - Assess pt frequently for physical needs  - Identify cognitive and physical deficits and behaviors that affect risk of falls.  - [place] fall precautions as indicated by assessment.  - Educate pt/family on patient safety including physical limitations  - Instruct pt to call for assistance with activity based on assessment  - Modify environment to reduce risk of injury  - Provide assistive devices as appropriate  - Consider OT/PT consult to assist with strengthening/mobility  - Encourage toileting schedule  Outcome: Progressing     Problem: DISCHARGE PLANNING  Goal: Discharge to home or other facility with appropriate resources  Description: INTERVENTIONS:  - Identify barriers to discharge w/pt and caregiver  - Include patient/family/discharge partner in discharge planning  - Arrange for needed discharge resources and transportation as appropriate  - Identify discharge learning needs (meds, wound care, etc)  -  Arrange for interpreters to assist at discharge as needed  - Consider post-discharge preferences of patient/family/discharge partner  - Complete POLST form as appropriate  - Assess patient's ability to be responsible for managing their own health  - Refer to Case Management Department for coordinating discharge planning if the patient needs post-hospital services based on physician/LIP order or complex needs related to functional status, cognitive ability or social support system  Outcome: Progressing     Problem: Patient/Family Goals  Goal: Patient/Family Long Term Goal  Description: Patient's Long Term Goal: Discharge from the hospital    Interventions:  - Monitor vital signs  - Monitor appropriate labs  - Pain management  - Administer medications per order  - Follow MD orders  - Diagnostics per order  - Update / inform patient and family on plan of care  - Discharge planning  - See additional Care Plan goals for specific interventions  Outcome: Progressing  Goal: Patient/Family Short Term Goal  Description: Patient's Short Term Goal: Improve shortness of breath and cough    Interventions:   - Monitor vital signs  - Monitor appropriate labs  - Pain management  - Administer medications per order  - Follow MD orders  - Diagnostics per order  - Update / inform patient and family on plan of care  - See additional Care Plan goals for specific interventions  Outcome: Progressing     Problem: SKIN/TISSUE INTEGRITY - ADULT  Goal: Skin integrity remains intact  Description: INTERVENTIONS  - Assess and document risk factors for pressure ulcer development  - Assess and document skin integrity  - Monitor for areas of redness and/or skin breakdown  - Initiate interventions, skin care algorithm/standards of care as needed  Outcome: Progressing

## 2025-03-02 NOTE — CM/SW NOTE
03/02/25 1600   Choice of Post-Acute Provider   Informed patient of right to choose their preferred provider Yes   List of appropriate post-acute services provided to patient/family with quality data No - Declined list   Patient/family choice [facility]   [facility]ospice financial disclosure given Yes       DHEERAJ spoke to pt via phone, who is agreeable to [facility] at discharge. Pt agreeable to [facility]. DHEERAJ reserved them in [place].     Per RN, anticipated discharge Monday per MD. Pt will need home O2. Pt currently at 1.5LO2. DHEERAJ requested RN for O2 sats. Referral sent to [facility] for review.     DHEERAJ messaged MD to request cosign of home O2 order and to also add the following verbiage to MD progress note:     I had a face to face visit with this patient and I evaluated the patient's O2 saturations.  The patient is mobile in their home and is in need of a portable oxygen tank. The home oxygen will improve the patient's condition.      Documentation for O2 sats: (RN to add to progress note)  Patient's O2 sat on room air is ____% at rest. Pt's O2 sat on room is ____% when ambulating, and ____% on ____ liter while ambulating.     (Reminder: The \"at rest\" and \"while ambulating\" are required statements. If patient is non ambulatory you can use \"with exertion\" such as during a transfer to assess their O2 level)    PLAN: Return to [facility]/ [facility] and home O2    Tarah Gilmore, [facility] - [id number]     good balance

## 2025-03-02 NOTE — PLAN OF CARE
Problem: Patient Centered Care  Goal: Patient preferences are identified and integrated in the patient's plan of care  Description: Interventions:  - What would you like us to know as we care for you? From [facility]   - Provide timely, complete, and accurate information to patient/family  - Incorporate patient and family knowledge, values, beliefs, and cultural backgrounds into the planning and delivery of care  - Encourage patient/family to participate in care and decision-making at the level they choose  - Honor patient and family perspectives and choices  Outcome: Progressing     Problem: CARDIOVASCULAR - ADULT  Goal: Maintains optimal cardiac output and hemodynamic stability  Description: INTERVENTIONS:  - Monitor vital signs, rhythm, and trends  - Monitor for bleeding, hypotension and signs of decreased cardiac output  - Evaluate effectiveness of vasoactive medications to optimize hemodynamic stability  - Monitor arterial and/or venous puncture sites for bleeding and/or hematoma  - Assess quality of pulses, skin color and temperature  - Assess for signs of decreased coronary artery perfusion - ex. Angina  - Evaluate fluid balance, assess for edema, trend weights  Outcome: Progressing  Goal: Absence of cardiac arrhythmias or at baseline  Description: INTERVENTIONS:  - Continuous cardiac monitoring, monitor vital signs, obtain 12 lead EKG if indicated  - Evaluate effectiveness of antiarrhythmic and heart rate control medications as ordered  - Initiate emergency measures for life threatening arrhythmias  - Monitor electrolytes and administer replacement therapy as ordered  Outcome: Progressing     Problem: RESPIRATORY - ADULT  Goal: Achieves optimal ventilation and oxygenation  Description: INTERVENTIONS:  - Assess for changes in respiratory status  - Assess for changes in mentation and behavior  - Position to facilitate oxygenation and minimize respiratory effort  - Oxygen  supplementation based on oxygen saturation or ABGs  - Provide Smoking Cessation handout, if applicable  - Encourage broncho-pulmonary hygiene including cough, deep breathe, Incentive Spirometry  - Assess the need for suctioning and perform as needed  - Assess and instruct to report SOB or any respiratory difficulty  - Respiratory Therapy support as indicated  - Manage/alleviate anxiety  - Monitor for signs/symptoms of CO2 retention  Outcome: Progressing     Problem: METABOLIC/FLUID AND ELECTROLYTES - ADULT  Goal: Electrolytes maintained within normal limits  Description: INTERVENTIONS:  - Monitor labs and rhythm and assess patient for signs and symptoms of electrolyte imbalances  - Administer electrolyte replacement as ordered  - Monitor response to electrolyte replacements, including rhythm and repeat lab results as appropriate  - Fluid restriction as ordered  - Instruct patient on fluid and nutrition restrictions as appropriate  Outcome: Progressing  Goal: Hemodynamic stability and optimal renal function maintained  Description: INTERVENTIONS:  - Monitor labs and assess for signs and symptoms of volume excess or deficit  - Monitor intake, output and patient weight  - Monitor urine specific gravity, serum osmolarity and serum sodium as indicated or ordered  - Monitor response to interventions for patient's volume status, including labs, urine output, blood pressure (other measures as available)  - Encourage oral intake as appropriate  - Instruct patient on fluid and nutrition restrictions as appropriate  Outcome: Progressing     Problem: MUSCULOSKELETAL - ADULT  Goal: Return mobility to safest level of function  Description: INTERVENTIONS:  - Assess patient stability and activity tolerance for standing, transferring and ambulating w/ or w/o assistive devices  - Assist with transfers and ambulation using safe patient handling equipment as needed  - Ensure adequate protection for wounds/incisions during  mobilization  - Obtain PT/OT consults as needed  - Advance activity as appropriate  - Communicate ordered activity level and limitations with patient/family  Outcome: Progressing     Problem: PAIN - ADULT  Goal: Verbalizes/displays adequate comfort level or patient's stated pain goal  Description: INTERVENTIONS:  - Encourage pt to monitor pain and request assistance  - Assess pain using appropriate pain scale  - Administer analgesics based on type and severity of pain and evaluate response  - Implement non-pharmacological measures as appropriate and evaluate response  - Consider cultural and social influences on pain and pain management  - Manage/alleviate anxiety  - Utilize distraction and/or relaxation techniques  - Monitor for opioid side effects  - Notify MD/LIP if interventions unsuccessful or patient reports new pain  - Anticipate increased pain with activity and pre-medicate as appropriate  Outcome: Progressing     Problem: SAFETY ADULT - FALL  Goal: Free from fall injury  Description: INTERVENTIONS:  - Assess pt frequently for physical needs  - Identify cognitive and physical deficits and behaviors that affect risk of falls.  - [place] fall precautions as indicated by assessment.  - Educate pt/family on patient safety including physical limitations  - Instruct pt to call for assistance with activity based on assessment  - Modify environment to reduce risk of injury  - Provide assistive devices as appropriate  - Consider OT/PT consult to assist with strengthening/mobility  - Encourage toileting schedule  Outcome: Progressing     Problem: DISCHARGE PLANNING  Goal: Discharge to home or other facility with appropriate resources  Description: INTERVENTIONS:  - Identify barriers to discharge w/pt and caregiver  - Include patient/family/discharge partner in discharge planning  - Arrange for needed discharge resources and transportation as appropriate  - Identify discharge learning needs (meds, wound care, etc)  -  Arrange for interpreters to assist at discharge as needed  - Consider post-discharge preferences of patient/family/discharge partner  - Complete POLST form as appropriate  - Assess patient's ability to be responsible for managing their own health  - Refer to Case Management Department for coordinating discharge planning if the patient needs post-hospital services based on physician/LIP order or complex needs related to functional status, cognitive ability or social support system  Outcome: Progressing     Problem: Patient/Family Goals  Goal: Patient/Family Long Term Goal  Description: Patient's Long Term Goal: Discharge from the hospital    Interventions:  - Monitor vital signs  - Monitor appropriate labs  - Pain management  - Administer medications per order  - Follow MD orders  - Diagnostics per order  - Update / inform patient and family on plan of care  - Discharge planning  - See additional Care Plan goals for specific interventions  Outcome: Progressing  Goal: Patient/Family Short Term Goal  Description: Patient's Short Term Goal: Improve shortness of breath and cough    Interventions:   - Monitor vital signs  - Monitor appropriate labs  - Pain management  - Administer medications per order  - Follow MD orders  - Diagnostics per order  - Update / inform patient and family on plan of care  - See additional Care Plan goals for specific interventions  Outcome: Progressing     Problem: SKIN/TISSUE INTEGRITY - ADULT  Goal: Skin integrity remains intact  Description: INTERVENTIONS  - Assess and document risk factors for pressure ulcer development  - Assess and document skin integrity  - Monitor for areas of redness and/or skin breakdown  - Initiate interventions, skin care algorithm/standards of care as needed  Outcome: Progressing

## 2025-03-02 NOTE — PROGRESS NOTES
[facility]  part of [facility]    Cardiology Progress Note    Marilu Dickens Patient Status:  Inpatient    1927 MRN [id number]   Location [facility] 5SW/SE Attending Adonis Quiroz MD   Hosp Day # 2 PCP ADONIS QUIROZ MD, MD       Impression/Plan:  89 year old female presenting with:     Acute hypoxic respiratory failure likely multifactorial 2/2 HFpEF and flu.  Acute on chronic HFpEF, precipitated by infection (EF 50-55% 2024)  CAD s/p CABG  HTN, controlled  HDL on statin  Renal artery stenosis- BP controlled     - Cont asa, statin, zetia, bb, arb  - Cont amlodipine, hydralazine for BP control  - Cont home po lasix 20mg daily; IV doses as needed.  Repeat CXR today, will give IV dose if persistent edema  - Flu management as per primary/pulm  - Monitor on tele  - Will follow      Subjective: No events overnight.  Today patient denies chest pain, palpitations, dyspnea.    Patient Active Problem List   Diagnosis    CAD (coronary artery disease)    Carotid artery disease    Dyslipidemia    Renal artery stenosis    S/P CABG (coronary artery bypass graft)    Essential hypertension    Syncope and collapse    Hyponatremia    Leukocytosis    Seizure (HCC)    Acute exacerbation of CHF (congestive heart failure) (HCC)    Acute on chronic congestive heart failure, unspecified heart failure type (HCC)    Acute MI (HCC)    Hyperglycemia    Influenza A       Objective:   Temp: 98.4 °F (36.9 °C)  Pulse: 79  Resp: 20  BP: 134/54    Intake/Output:     Intake/Output Summary (Last 24 hours) at 3/2/2025 1011  Last data filed at 3/2/2025 0823  Gross per 24 hour   Intake 360 ml   Output 660 ml   Net -300 ml       Last 3 Weights   25 0612 132 lb 12.8 oz (60.2 kg)   25 0544 129 lb 6.4 oz (58.7 kg)   25 2226 129 lb 3.2 oz (58.6 kg)   25 1627 132 lb (59.9 kg)   12/10/24 1100 126 lb (57.2 kg)   24 1000 126 lb (57.2 kg)   11/10/24 0547 128 lb 6.4 oz (58.2 kg)   24 0341 131 lb  12.8 oz (59.8 kg)   11/08/24 0442 130 lb 8 oz (59.2 kg)   11/07/24 0150 134 lb (60.8 kg)   11/06/24 2143 134 lb (60.8 kg)   11/06/24 1845 130 lb (59 kg)   08/19/23 1923 138 lb 0.1 oz (62.6 kg)       Tele: NSR, PVCs    Physical Exam:    General: Alert and oriented x 3. No apparent distress. No respiratory or constitutional distress.  HEENT: Normocephalic, anicteric sclera, neck supple, no thyromegaly or adenopathy.  Neck: No JVD, carotids 2+, no bruits.  Cardiac: Regular rate and rhythm. S1, S2 normal. No murmur, pericardial rub, S3, thrill, heave or extra cardiac sounds.  Lungs: Exp wheezing bilat  Abdomen: Soft, non-tender. No organosplenomegally, mass or rebound, BS-present.  Extremities: +LLE edema  Neurologic: Alert and oriented, normal affect. No motor or coordinational deficit.  Skin: Warm and dry.     Laboratory/Data:    Labs:         Recent Labs   Lab 02/26/25  1647 02/28/25  0535   WBC 18.6* 16.3*   HGB 12.6 10.7*   MCV 87.3 87.1   .0* 486.0*       Recent Labs   Lab 02/26/25  1647 02/28/25  0535   * 133*   K 3.9 4.0    98   CO2 23.0 27.0   BUN 12 26*   CREATSERUM 1.07* 1.06*   CA 9.0 8.2*   * 167*       Recent Labs   Lab 02/28/25  0535   ALT 13   AST 26   ALB 3.4       No results for input(s): \"TROP\" in the last 168 hours.    Allergies:   Allergies[1]    Medications:  Current Facility-Administered Medications   Medication Dose Route Frequency    predniSONE (Deltasone) tab 20 mg  20 mg Oral Daily with breakfast    cephALEXin (Keflex) cap 500 mg  500 mg Oral BID    aspirin DR tab 81 mg  81 mg Oral Daily    acetaminophen (Tylenol) tab 650 mg  650 mg Oral Q6H PRN    melatonin tab 3 mg  3 mg Oral Nightly PRN    guaiFENesin (Robitussin) 100 MG/5 ML oral liquid 200 mg  200 mg Oral Q6H PRN    ipratropium-albuterol (Duoneb) 0.5-2.5 (3) MG/3ML inhalation solution 3 mL  3 mL Nebulization 4 times per day    enoxaparin (Lovenox) 30 MG/0.3ML SUBQ injection 30 mg  30 mg Subcutaneous Q24H     amLODIPine (Norvasc) tab 5 mg  5 mg Oral Nightly    ezetimibe (Zetia) tab 10 mg  10 mg Oral Daily    furosemide (Lasix) tab 20 mg  20 mg Oral Daily    hydrALAZINE (Apresoline) tab 25 mg  25 mg Oral Daily    losartan (Cozaar) tab 100 mg  100 mg Oral Daily    metoprolol succinate ER (Toprol XL) 24 hr tab 100 mg  100 mg Oral Daily    atorvastatin (Lipitor) tab 20 mg  20 mg Oral Nightly       Sudhakar Sibley MD  3/2/2025  10:11 AM         [1]   Allergies  Allergen Reactions    Sulfa Antibiotics

## 2025-03-03 VITALS
DIASTOLIC BLOOD PRESSURE: 68 MMHG | OXYGEN SATURATION: 95 % | HEART RATE: 76 BPM | HEIGHT: 61 IN | SYSTOLIC BLOOD PRESSURE: 155 MMHG | BODY MASS INDEX: 25.07 KG/M2 | TEMPERATURE: 98 F | WEIGHT: 132.81 LBS | RESPIRATION RATE: 17 BRPM

## 2025-03-03 PROCEDURE — 94640 AIRWAY INHALATION TREATMENT: CPT

## 2025-03-03 RX ORDER — PREDNISONE 5 MG/1
5 TABLET ORAL
Status: DISCONTINUED | OUTPATIENT
Start: 2025-03-03 | End: 2025-03-03

## 2025-03-03 RX ORDER — PREDNISONE 5 MG/1
5 TABLET ORAL
Qty: 5 TABLET | Refills: 0 | Status: SHIPPED | OUTPATIENT
Start: 2025-03-04

## 2025-03-03 NOTE — PROGRESS NOTES
Documentation for O2 sats: (RN to add a progress note with either o2 walk written out or flow sheet added to progress note )  SPO2% on Room Air at Rest: 94  SPO2% on Oxygen at Rest: 99  At rest oxygen flow (liters per minute): 1L  SPO2% Ambulation on Oxygen:95    Ambulation oxygen flow (liters per minute): 92

## 2025-03-03 NOTE — PROGRESS NOTES
[facility]  [facility]  Cardiology Progress Note    Marilu Dickens Patient Status:  Inpatient    1927 MRN [id number]   Location Health system 5SW/SE Attending Adonis Quiroz MD   Hosp Day # 3 PCP ADONIS QUIROZ MD, MD       Impression:  Acute hypoxic respiratory failure likely multifactorial 2/2 HFpEF and flu.  Acute on chronic HFpEF, precipitated by infection  CAD s/p CABG  HTN, controlled  HDL on statin  Renal artery stenosis- BP controlled     - s/p IV lasix in the ED. Received spot dose of IV lasix with good response.   - contnue home lasix 20mg daily  - continue ASA/statin  - continue home anti-hypertensives    Okay to DC from CV standpoint.     Subjective:   Cough is much better. No chest pain. Breathing improved and patient not requiring O2 at rest.    Patient Active Problem List   Diagnosis    CAD (coronary artery disease)    Carotid artery disease    Dyslipidemia    Renal artery stenosis    S/P CABG (coronary artery bypass graft)    Essential hypertension    Syncope and collapse    Hyponatremia    Leukocytosis    Seizure (HCC)    Acute exacerbation of CHF (congestive heart failure) (HCC)    Acute on chronic congestive heart failure, unspecified heart failure type (HCC)    Acute MI (HCC)    Hyperglycemia    Influenza A       Objective:   Temp: 97.6 °F (36.4 °C)  Pulse: 76  Resp: 17  BP: 155/68    Intake/Output:     Intake/Output Summary (Last 24 hours) at 3/3/2025 1516  Last data filed at 3/3/2025 0519  Gross per 24 hour   Intake 420 ml   Output 51 ml   Net 369 ml       Last 3 Weights   25 0612 132 lb 12.8 oz (60.2 kg)   25 0544 129 lb 6.4 oz (58.7 kg)   25 2226 129 lb 3.2 oz (58.6 kg)   25 1627 132 lb (59.9 kg)   12/10/24 1100 126 lb (57.2 kg)   24 1000 126 lb (57.2 kg)   11/10/24 0547 128 lb 6.4 oz (58.2 kg)   24 0341 131 lb 12.8 oz (59.8 kg)   24 0442 130 lb 8 oz (59.2 kg)   24 0150 134 lb (60.8 kg)   24 5795 134 lb (60.8 kg)    11/06/24 1845 130 lb (59 kg)   08/19/23 1923 138 lb 0.1 oz (62.6 kg)       Tele: NSR    Physical Exam:    General: awake, alert, oriented x 3, no acute distress  HEENT: at/nc, perrl, eomi  Neck: No JVD, carotids 2+ no bruits.  Cardiac: Regular rate and rhythm, S1, S2 normal, no murmur, rub or gallop.  Lungs: right middle and upper lobe rales, otherwise CTA  Abdomen: Soft, non-tender, non-distended, normal bowel sounds   Extremities: Without clubbing, cyanosis or edema.  Peripheral pulses are 2+.  Neurologic: Alert and oriented, normal affect.  Psych: normal mood and affect  Skin: Warm and dry.     Laboratory/Data:    Labs:         Recent Labs   Lab 02/26/25  1647 02/28/25  0535   WBC 18.6* 16.3*   HGB 12.6 10.7*   MCV 87.3 87.1   .0* 486.0*       Recent Labs   Lab 02/26/25  1647 02/28/25  0535   * 133*   K 3.9 4.0    98   CO2 23.0 27.0   BUN 12 26*   CREATSERUM 1.07* 1.06*   CA 9.0 8.2*   * 167*       Recent Labs   Lab 02/28/25  0535   ALT 13   AST 26   ALB 3.4       No results for input(s): \"TROP\" in the last 168 hours.    Allergies:   Allergies[1]    Medications:  Current Facility-Administered Medications   Medication Dose Route Frequency    predniSONE (Deltasone) tab 5 mg  5 mg Oral Daily with breakfast    aspirin DR tab 81 mg  81 mg Oral Daily    acetaminophen (Tylenol) tab 650 mg  650 mg Oral Q6H PRN    melatonin tab 3 mg  3 mg Oral Nightly PRN    guaiFENesin (Robitussin) 100 MG/5 ML oral liquid 200 mg  200 mg Oral Q6H PRN    ipratropium-albuterol (Duoneb) 0.5-2.5 (3) MG/3ML inhalation solution 3 mL  3 mL Nebulization 4 times per day    enoxaparin (Lovenox) 30 MG/0.3ML SUBQ injection 30 mg  30 mg Subcutaneous Q24H    amLODIPine (Norvasc) tab 5 mg  5 mg Oral Nightly    ezetimibe (Zetia) tab 10 mg  10 mg Oral Daily    furosemide (Lasix) tab 20 mg  20 mg Oral Daily    hydrALAZINE (Apresoline) tab 25 mg  25 mg Oral Daily    losartan (Cozaar) tab 100 mg  100 mg Oral Daily    metoprolol  succinate ER (Toprol XL) 24 hr tab 100 mg  100 mg Oral Daily    atorvastatin (Lipitor) tab 20 mg  20 mg Oral Nightly       Dg Smith MD  Interventional Cardiology  03/03/25         [1]   Allergies  Allergen Reactions    Sulfa Antibiotics

## 2025-03-03 NOTE — PROGRESS NOTES
[facility]    Marilu Dickens Patient Status:  Inpatient    1927 MRN [id number]   Location [facility] 5SW/SE Attending Adonis Quiroz MD   Hosp Day # 3 PCP ADONIS QUIROZ MD, MD     Marilu Dickens is a 89 year old female patient.    1. Influenza A    2. Acute on chronic congestive heart failure, unspecified heart failure type (HCC)      On Lasix  Cards following  PO abc and steroids  Tamiflu completed  Bronchodilation  Wean oxygen, home o2 eval  DW Pt  Dc to [facility]  I had a face to face visit with this patient and I evaluated the patient's O2 saturations. The patient is mobile in their home and is in need of a portable oxygen tank. The home oxygen will improve the patient's condition.     Past Medical History:    Atherosclerosis of coronary artery    CAD (coronary artery disease)    Carotid artery disease    Dyslipidemia    Hearing impaired person, bilateral    High blood pressure    Renal artery stenosis       No current outpatient medications on file.     Allergies[1]  Principal Problem:    Influenza A  Active Problems:    Acute on chronic congestive heart failure, unspecified heart failure type (HCC)    Hyperglycemia    Blood pressure 130/60, pulse 76, temperature 97.8 °F (36.6 °C), temperature source Oral, resp. rate 18, height 5' 1\" (1.549 m), weight 132 lb 12.8 oz (60.2 kg), SpO2 91%.    Review of Systems   Respiratory:  Positive for cough, sputum production, shortness of breath and wheezing.    Neurological:  Loss of consciousness: switch to PO meds.       Physical Exam  Cardiovascular:      Pulses: Normal pulses.      Heart sounds: Normal heart sounds.   Pulmonary:      Effort: Pulmonary effort is normal.      Breath sounds: Wheezing present.   Abdominal:      General: Abdomen is flat. Bowel sounds are normal.      Palpations: Abdomen is soft.         MARLO WALKER MD  2025         [1]   Allergies  Allergen Reactions    Sulfa Antibiotics

## 2025-03-03 NOTE — PLAN OF CARE
Problem: Patient Centered Care  Goal: Patient preferences are identified and integrated in the patient's plan of care  Description: Interventions:  - What would you like us to know as we care for you? From [facility]   - Provide timely, complete, and accurate information to patient/family  - Incorporate patient and family knowledge, values, beliefs, and cultural backgrounds into the planning and delivery of care  - Encourage patient/family to participate in care and decision-making at the level they choose  - Honor patient and family perspectives and choices  Outcome: Progressing     Problem: CARDIOVASCULAR - ADULT  Goal: Maintains optimal cardiac output and hemodynamic stability  Description: INTERVENTIONS:  - Monitor vital signs, rhythm, and trends  - Monitor for bleeding, hypotension and signs of decreased cardiac output  - Evaluate effectiveness of vasoactive medications to optimize hemodynamic stability  - Monitor arterial and/or venous puncture sites for bleeding and/or hematoma  - Assess quality of pulses, skin color and temperature  - Assess for signs of decreased coronary artery perfusion - ex. Angina  - Evaluate fluid balance, assess for edema, trend weights  Outcome: Progressing  Goal: Absence of cardiac arrhythmias or at baseline  Description: INTERVENTIONS:  - Continuous cardiac monitoring, monitor vital signs, obtain 12 lead EKG if indicated  - Evaluate effectiveness of antiarrhythmic and heart rate control medications as ordered  - Initiate emergency measures for life threatening arrhythmias  - Monitor electrolytes and administer replacement therapy as ordered  Outcome: Progressing     Problem: RESPIRATORY - ADULT  Goal: Achieves optimal ventilation and oxygenation  Description: INTERVENTIONS:  - Assess for changes in respiratory status  - Assess for changes in mentation and behavior  - Position to facilitate oxygenation and minimize respiratory effort  - Oxygen  supplementation based on oxygen saturation or ABGs  - Provide Smoking Cessation handout, if applicable  - Encourage broncho-pulmonary hygiene including cough, deep breathe, Incentive Spirometry  - Assess the need for suctioning and perform as needed  - Assess and instruct to report SOB or any respiratory difficulty  - Respiratory Therapy support as indicated  - Manage/alleviate anxiety  - Monitor for signs/symptoms of CO2 retention  Outcome: Progressing     Problem: METABOLIC/FLUID AND ELECTROLYTES - ADULT  Goal: Electrolytes maintained within normal limits  Description: INTERVENTIONS:  - Monitor labs and rhythm and assess patient for signs and symptoms of electrolyte imbalances  - Administer electrolyte replacement as ordered  - Monitor response to electrolyte replacements, including rhythm and repeat lab results as appropriate  - Fluid restriction as ordered  - Instruct patient on fluid and nutrition restrictions as appropriate  Outcome: Progressing  Goal: Hemodynamic stability and optimal renal function maintained  Description: INTERVENTIONS:  - Monitor labs and assess for signs and symptoms of volume excess or deficit  - Monitor intake, output and patient weight  - Monitor urine specific gravity, serum osmolarity and serum sodium as indicated or ordered  - Monitor response to interventions for patient's volume status, including labs, urine output, blood pressure (other measures as available)  - Encourage oral intake as appropriate  - Instruct patient on fluid and nutrition restrictions as appropriate  Outcome: Progressing     Problem: MUSCULOSKELETAL - ADULT  Goal: Return mobility to safest level of function  Description: INTERVENTIONS:  - Assess patient stability and activity tolerance for standing, transferring and ambulating w/ or w/o assistive devices  - Assist with transfers and ambulation using safe patient handling equipment as needed  - Ensure adequate protection for wounds/incisions during  mobilization  - Obtain PT/OT consults as needed  - Advance activity as appropriate  - Communicate ordered activity level and limitations with patient/family  Outcome: Progressing     Problem: PAIN - ADULT  Goal: Verbalizes/displays adequate comfort level or patient's stated pain goal  Description: INTERVENTIONS:  - Encourage pt to monitor pain and request assistance  - Assess pain using appropriate pain scale  - Administer analgesics based on type and severity of pain and evaluate response  - Implement non-pharmacological measures as appropriate and evaluate response  - Consider cultural and social influences on pain and pain management  - Manage/alleviate anxiety  - Utilize distraction and/or relaxation techniques  - Monitor for opioid side effects  - Notify MD/LIP if interventions unsuccessful or patient reports new pain  - Anticipate increased pain with activity and pre-medicate as appropriate  Outcome: Progressing     Problem: SAFETY ADULT - FALL  Goal: Free from fall injury  Description: INTERVENTIONS:  - Assess pt frequently for physical needs  - Identify cognitive and physical deficits and behaviors that affect risk of falls.  - [place] fall precautions as indicated by assessment.  - Educate pt/family on patient safety including physical limitations  - Instruct pt to call for assistance with activity based on assessment  - Modify environment to reduce risk of injury  - Provide assistive devices as appropriate  - Consider OT/PT consult to assist with strengthening/mobility  - Encourage toileting schedule  Outcome: Progressing     Problem: DISCHARGE PLANNING  Goal: Discharge to home or other facility with appropriate resources  Description: INTERVENTIONS:  - Identify barriers to discharge w/pt and caregiver  - Include patient/family/discharge partner in discharge planning  - Arrange for needed discharge resources and transportation as appropriate  - Identify discharge learning needs (meds, wound care, etc)  -  Arrange for interpreters to assist at discharge as needed  - Consider post-discharge preferences of patient/family/discharge partner  - Complete POLST form as appropriate  - Assess patient's ability to be responsible for managing their own health  - Refer to Case Management Department for coordinating discharge planning if the patient needs post-hospital services based on physician/LIP order or complex needs related to functional status, cognitive ability or social support system  Outcome: Progressing     Problem: Patient/Family Goals  Goal: Patient/Family Long Term Goal  Description: Patient's Long Term Goal: Discharge from the hospital    Interventions:  - Monitor vital signs  - Monitor appropriate labs  - Pain management  - Administer medications per order  - Follow MD orders  - Diagnostics per order  - Update / inform patient and family on plan of care  - Discharge planning  - See additional Care Plan goals for specific interventions  Outcome: Progressing  Goal: Patient/Family Short Term Goal  Description: Patient's Short Term Goal: Improve shortness of breath and cough    Interventions:   - Monitor vital signs  - Monitor appropriate labs  - Pain management  - Administer medications per order  - Follow MD orders  - Diagnostics per order  - Update / inform patient and family on plan of care  - See additional Care Plan goals for specific interventions  Outcome: Progressing     Problem: SKIN/TISSUE INTEGRITY - ADULT  Goal: Skin integrity remains intact  Description: INTERVENTIONS  - Assess and document risk factors for pressure ulcer development  - Assess and document skin integrity  - Monitor for areas of redness and/or skin breakdown  - Initiate interventions, skin care algorithm/standards of care as needed  Outcome: Progressing

## 2025-03-03 NOTE — PLAN OF CARE
Patient has been ambulating 1 assist w/walker. No complaints of pain at this time. Is on RA, walk test done and patients oxygen has improved. Is tolerating diet, no n/v reported. Has been ambulating to the bathroom w/ assistance. Has lovenox and scds at bedside. Patient has been cleared to dc to [facility]. Daughter notified, per daughter she is able to pick patient up at 1900 today. Rx and dc instructions provided.     Problem: Patient Centered Care  Goal: Patient preferences are identified and integrated in the patient's plan of care  Description: Interventions:  - What would you like us to know as we care for you? [facility]   - Provide timely, complete, and accurate information to patient/family  - Incorporate patient and family knowledge, values, beliefs, and cultural backgrounds into the planning and delivery of care  - Encourage patient/family to participate in care and decision-making at the level they choose  - Honor patient and family perspectives and choices  Outcome: Adequate for Discharge     Problem: CARDIOVASCULAR - ADULT  Goal: Maintains optimal cardiac output and hemodynamic stability  Description: INTERVENTIONS:  - Monitor vital signs, rhythm, and trends  - Monitor for bleeding, hypotension and signs of decreased cardiac output  - Evaluate effectiveness of vasoactive medications to optimize hemodynamic stability  - Monitor arterial and/or venous puncture sites for bleeding and/or hematoma  - Assess quality of pulses, skin color and temperature  - Assess for signs of decreased coronary artery perfusion - ex. Angina  - Evaluate fluid balance, assess for edema, trend weights  Outcome: Adequate for Discharge  Goal: Absence of cardiac arrhythmias or at baseline  Description: INTERVENTIONS:  - Continuous cardiac monitoring, monitor vital signs, obtain 12 lead EKG if indicated  - Evaluate effectiveness of antiarrhythmic and heart rate control medications as ordered  -  Initiate emergency measures for life threatening arrhythmias  - Monitor electrolytes and administer replacement therapy as ordered  Outcome: Adequate for Discharge     Problem: RESPIRATORY - ADULT  Goal: Achieves optimal ventilation and oxygenation  Description: INTERVENTIONS:  - Assess for changes in respiratory status  - Assess for changes in mentation and behavior  - Position to facilitate oxygenation and minimize respiratory effort  - Oxygen supplementation based on oxygen saturation or ABGs  - Provide Smoking Cessation handout, if applicable  - Encourage broncho-pulmonary hygiene including cough, deep breathe, Incentive Spirometry  - Assess the need for suctioning and perform as needed  - Assess and instruct to report SOB or any respiratory difficulty  - Respiratory Therapy support as indicated  - Manage/alleviate anxiety  - Monitor for signs/symptoms of CO2 retention  Outcome: Adequate for Discharge     Problem: METABOLIC/FLUID AND ELECTROLYTES - ADULT  Goal: Electrolytes maintained within normal limits  Description: INTERVENTIONS:  - Monitor labs and rhythm and assess patient for signs and symptoms of electrolyte imbalances  - Administer electrolyte replacement as ordered  - Monitor response to electrolyte replacements, including rhythm and repeat lab results as appropriate  - Fluid restriction as ordered  - Instruct patient on fluid and nutrition restrictions as appropriate  Outcome: Adequate for Discharge  Goal: Hemodynamic stability and optimal renal function maintained  Description: INTERVENTIONS:  - Monitor labs and assess for signs and symptoms of volume excess or deficit  - Monitor intake, output and patient weight  - Monitor urine specific gravity, serum osmolarity and serum sodium as indicated or ordered  - Monitor response to interventions for patient's volume status, including labs, urine output, blood pressure (other measures as available)  - Encourage oral intake as appropriate  - Instruct  patient on fluid and nutrition restrictions as appropriate  Outcome: Adequate for Discharge     Problem: MUSCULOSKELETAL - ADULT  Goal: Return mobility to safest level of function  Description: INTERVENTIONS:  - Assess patient stability and activity tolerance for standing, transferring and ambulating w/ or w/o assistive devices  - Assist with transfers and ambulation using safe patient handling equipment as needed  - Ensure adequate protection for wounds/incisions during mobilization  - Obtain PT/OT consults as needed  - Advance activity as appropriate  - Communicate ordered activity level and limitations with patient/family  Outcome: Adequate for Discharge     Problem: SKIN/TISSUE INTEGRITY - ADULT  Goal: Skin integrity remains intact  Description: INTERVENTIONS  - Assess and document risk factors for pressure ulcer development  - Assess and document skin integrity  - Monitor for areas of redness and/or skin breakdown  - Initiate interventions, skin care algorithm/standards of care as needed  Outcome: Adequate for Discharge     Problem: PAIN - ADULT  Goal: Verbalizes/displays adequate comfort level or patient's stated pain goal  Description: INTERVENTIONS:  - Encourage pt to monitor pain and request assistance  - Assess pain using appropriate pain scale  - Administer analgesics based on type and severity of pain and evaluate response  - Implement non-pharmacological measures as appropriate and evaluate response  - Consider cultural and social influences on pain and pain management  - Manage/alleviate anxiety  - Utilize distraction and/or relaxation techniques  - Monitor for opioid side effects  - Notify MD/LIP if interventions unsuccessful or patient reports new pain  - Anticipate increased pain with activity and pre-medicate as appropriate  Outcome: Adequate for Discharge     Problem: SAFETY ADULT - FALL  Goal: Free from fall injury  Description: INTERVENTIONS:  - Assess pt frequently for physical needs  -  Identify cognitive and physical deficits and behaviors that affect risk of falls.  - [place] fall precautions as indicated by assessment.  - Educate pt/family on patient safety including physical limitations  - Instruct pt to call for assistance with activity based on assessment  - Modify environment to reduce risk of injury  - Provide assistive devices as appropriate  - Consider OT/PT consult to assist with strengthening/mobility  - Encourage toileting schedule  Outcome: Adequate for Discharge     Problem: DISCHARGE PLANNING  Goal: Discharge to home or other facility with appropriate resources  Description: INTERVENTIONS:  - Identify barriers to discharge w/pt and caregiver  - Include patient/family/discharge partner in discharge planning  - Arrange for needed discharge resources and transportation as appropriate  - Identify discharge learning needs (meds, wound care, etc)  - Arrange for interpreters to assist at discharge as needed  - Consider post-discharge preferences of patient/family/discharge partner  - Complete POLST form as appropriate  - Assess patient's ability to be responsible for managing their own health  - Refer to Case Management Department for coordinating discharge planning if the patient needs post-hospital services based on physician/LIP order or complex needs related to functional status, cognitive ability or social support system  Outcome: Adequate for Discharge     Problem: Patient/Family Goals  Goal: Patient/Family Long Term Goal  Description: Patient's Long Term Goal: Discharge from the hospital    Interventions:  - Monitor vital signs  - Monitor appropriate labs  - Pain management  - Administer medications per order  - Follow MD orders  - Diagnostics per order  - Update / inform patient and family on plan of care  - Discharge planning  - See additional Care Plan goals for specific interventions  Outcome: Adequate for Discharge  Goal: Patient/Family Short Term Goal  Description: Patient's  Short Term Goal: Improve shortness of breath and cough    Interventions:   - Monitor vital signs  - Monitor appropriate labs  - Pain management  - Administer medications per order  - Follow MD orders  - Diagnostics per order  - Update / inform patient and family on plan of care  - See additional Care Plan goals for specific interventions  Outcome: Adequate for Discharge

## 2025-03-03 NOTE — DISCHARGE INSTRUCTIONS
Sometimes managing your health at home requires assistance.  The [facility] team has recognized your preference to use [facility].  They can be reached by phone at [phone].  The fax number for your reference is [phone].  A representative from the home health agency will contact you or your family to schedule your first visit.

## 2025-03-03 NOTE — CM/SW NOTE
Documentation for O2 sats: (RN to add a progress note with either o2 walk written out or flow sheet added to progress note )  SPO2% on Room Air at Rest:   SPO2% on Oxygen at Rest:   At rest oxygen flow (liters per minute):   SPO2% Ambulation on Oxygen:   Ambulation oxygen flow (liters per minute):     OR      (Found in the RN flowsheets called daily cares)   Mobility   O2 walk? Yes   SPO2% on Room Air at Rest    SPO2% on Oxygen at Rest    At rest oxygen flow (liters per minute)    SPO2% Ambulation on Oxygen    Ambulation oxygen flow (liters per minute)         (Reminder: The \"at rest\" and \"while ambulating\" are required statements. If patient is non ambulatory you can use \"with exertion\" such as during a transfer to assess their O2 level)        MD to document AFTER O2 sats  I had a face to face visit with this patient and I evaluated the patient's O2 saturations.  The patient is mobile in their home and is in need of a portable oxygen tank.  The home oxygen will improve the patient's condition.      Once O2 sats and MD verbiage are completed and IF home O2 is indicated, SW to place MDO in [facility] and request MD to cosign as appropriate. (Dx: CHF)    1239pm- SW was informed that patient does not need home oxygen. RN performed walk test.  At this time patient does not qualify for Home oxygen    SW/CM to remain available for support and/or discharge planning.     Yoli Dowell, MSW, LSW   x [phone]
